# Patient Record
Sex: FEMALE | Race: ASIAN | NOT HISPANIC OR LATINO
[De-identification: names, ages, dates, MRNs, and addresses within clinical notes are randomized per-mention and may not be internally consistent; named-entity substitution may affect disease eponyms.]

---

## 2017-01-05 ENCOUNTER — OTHER (OUTPATIENT)
Age: 57
End: 2017-01-05

## 2017-01-19 ENCOUNTER — APPOINTMENT (OUTPATIENT)
Dept: INTERNAL MEDICINE | Facility: CLINIC | Age: 57
End: 2017-01-19

## 2017-01-19 ENCOUNTER — OUTPATIENT (OUTPATIENT)
Dept: OUTPATIENT SERVICES | Facility: HOSPITAL | Age: 57
LOS: 1 days | End: 2017-01-19
Payer: COMMERCIAL

## 2017-01-19 VITALS
BODY MASS INDEX: 21.4 KG/M2 | DIASTOLIC BLOOD PRESSURE: 100 MMHG | HEART RATE: 95 BPM | HEIGHT: 60 IN | OXYGEN SATURATION: 99 % | WEIGHT: 109 LBS | SYSTOLIC BLOOD PRESSURE: 165 MMHG

## 2017-01-19 VITALS
HEIGHT: 59.5 IN | TEMPERATURE: 99 F | RESPIRATION RATE: 14 BRPM | SYSTOLIC BLOOD PRESSURE: 140 MMHG | HEART RATE: 82 BPM | WEIGHT: 108.03 LBS | DIASTOLIC BLOOD PRESSURE: 100 MMHG

## 2017-01-19 DIAGNOSIS — R87.619 UNSPECIFIED ABNORMAL CYTOLOGICAL FINDINGS IN SPECIMENS FROM CERVIX UTERI: ICD-10-CM

## 2017-01-19 DIAGNOSIS — Z98.890 OTHER SPECIFIED POSTPROCEDURAL STATES: Chronic | ICD-10-CM

## 2017-01-19 DIAGNOSIS — R87.611 ATYPICAL SQUAMOUS CELLS CANNOT EXCLUDE HIGH GRADE SQUAMOUS INTRAEPITHELIAL LESION ON CYTOLOGIC SMEAR OF CERVIX (ASC-H): ICD-10-CM

## 2017-01-19 DIAGNOSIS — Z98.891 HISTORY OF UTERINE SCAR FROM PREVIOUS SURGERY: Chronic | ICD-10-CM

## 2017-01-19 LAB
BUN SERPL-MCNC: 19 MG/DL — SIGNIFICANT CHANGE UP (ref 7–23)
CALCIUM SERPL-MCNC: 10.7 MG/DL — HIGH (ref 8.4–10.5)
CHLORIDE SERPL-SCNC: 100 MMOL/L — SIGNIFICANT CHANGE UP (ref 98–107)
CO2 SERPL-SCNC: 29 MMOL/L — SIGNIFICANT CHANGE UP (ref 22–31)
CREAT SERPL-MCNC: 0.71 MG/DL — SIGNIFICANT CHANGE UP (ref 0.5–1.3)
CYTOLOGY CVX/VAG DOC THIN PREP: ABNORMAL
GLUCOSE SERPL-MCNC: 96 MG/DL — SIGNIFICANT CHANGE UP (ref 70–99)
HCT VFR BLD CALC: 44.1 % — SIGNIFICANT CHANGE UP (ref 34.5–45)
HGB BLD-MCNC: 14.9 G/DL — SIGNIFICANT CHANGE UP (ref 11.5–15.5)
MCHC RBC-ENTMCNC: 30.7 PG — SIGNIFICANT CHANGE UP (ref 27–34)
MCHC RBC-ENTMCNC: 33.8 % — SIGNIFICANT CHANGE UP (ref 32–36)
MCV RBC AUTO: 90.7 FL — SIGNIFICANT CHANGE UP (ref 80–100)
PLATELET # BLD AUTO: 304 K/UL — SIGNIFICANT CHANGE UP (ref 150–400)
PMV BLD: 9.8 FL — SIGNIFICANT CHANGE UP (ref 7–13)
POTASSIUM SERPL-MCNC: 4.1 MMOL/L — SIGNIFICANT CHANGE UP (ref 3.5–5.3)
POTASSIUM SERPL-SCNC: 4.1 MMOL/L — SIGNIFICANT CHANGE UP (ref 3.5–5.3)
RBC # BLD: 4.86 M/UL — SIGNIFICANT CHANGE UP (ref 3.8–5.2)
RBC # FLD: 12.9 % — SIGNIFICANT CHANGE UP (ref 10.3–14.5)
SODIUM SERPL-SCNC: 142 MMOL/L — SIGNIFICANT CHANGE UP (ref 135–145)
WBC # BLD: 4.78 K/UL — SIGNIFICANT CHANGE UP (ref 3.8–10.5)
WBC # FLD AUTO: 4.78 K/UL — SIGNIFICANT CHANGE UP (ref 3.8–10.5)

## 2017-01-19 PROCEDURE — 93010 ELECTROCARDIOGRAM REPORT: CPT

## 2017-01-19 NOTE — H&P PST ADULT - GASTROINTESTINAL DETAILS
soft/no rigidity/bowel sounds normal/nontender/no distention/no guarding/no bruit/no rebound tenderness/no masses palpable/no organomegaly

## 2017-01-19 NOTE — H&P PST ADULT - NEGATIVE CARDIOVASCULAR SYMPTOMS
no orthopnea/no paroxysmal nocturnal dyspnea/no peripheral edema/no dyspnea on exertion/no chest pain/no palpitations/no claudication

## 2017-01-19 NOTE — H&P PST ADULT - RS GEN PE MLT RESP DETAILS PC
respirations non-labored/clear to auscultation bilaterally/good air movement/no rales/no chest wall tenderness/no intercostal retractions/no rhonchi/breath sounds equal/no wheezes

## 2017-01-19 NOTE — H&P PST ADULT - NEGATIVE GENERAL GENITOURINARY SYMPTOMS
no flank pain L/no flank pain R/normal urinary frequency/no dysuria/no bladder infections/no hematuria

## 2017-01-19 NOTE — H&P PST ADULT - PROBLEM SELECTOR PLAN 1
Pt scheduled for cold knife cone biopsy on 1/25/2017.  labs done results pending, ekg done.  Preop teaching done, pt able to verbalize understanding.    OR booking notified of sleep apnea and latex allergy    htn- bp 140/100 pt instructed to obtain medical clearance. Sammi surgical coordinator in Dr. Mills office notified.   Preop teaching done, pt able to verbalize understanding.

## 2017-01-19 NOTE — H&P PST ADULT - MARITAL STATUS
/4 children, mother htn, heart problem, elevated chol , father  55y/o emphysema, 1 brother htn, elevated chol, gall stones

## 2017-01-19 NOTE — H&P PST ADULT - NEGATIVE GENERAL SYMPTOMS
no weight gain/no chills/no polydipsia/no polyphagia/no anorexia/no fatigue/no sweating/no polyuria/no fever/no malaise/no weight loss

## 2017-01-19 NOTE — H&P PST ADULT - NEGATIVE BREAST SYMPTOMS
no breast tenderness R/no nipple discharge R/no breast lump R/no nipple discharge L/no breast tenderness L/no breast lump L

## 2017-01-19 NOTE — H&P PST ADULT - HISTORY OF PRESENT ILLNESS
55y/o female scheduled for cold knife cone biopsy on 1/25/2017.  Pt states, "had 2 abnormal pap smears, s/p colposcopy."

## 2017-01-19 NOTE — H&P PST ADULT - PMH
Abnormal Pap smear of cervix    Hodgkin lymphoma  dx 2001, s/p chemo and radiation last dose 2002  Hypertension    Sleep apnea

## 2017-01-24 ENCOUNTER — RESULT REVIEW (OUTPATIENT)
Age: 57
End: 2017-01-24

## 2017-01-25 ENCOUNTER — APPOINTMENT (OUTPATIENT)
Dept: GYNECOLOGIC ONCOLOGY | Facility: HOSPITAL | Age: 57
End: 2017-01-25

## 2017-01-25 ENCOUNTER — OUTPATIENT (OUTPATIENT)
Dept: OUTPATIENT SERVICES | Facility: HOSPITAL | Age: 57
LOS: 1 days | Discharge: HOME | End: 2017-01-25
Payer: COMMERCIAL

## 2017-01-25 VITALS
OXYGEN SATURATION: 96 % | RESPIRATION RATE: 12 BRPM | TEMPERATURE: 98 F | DIASTOLIC BLOOD PRESSURE: 70 MMHG | SYSTOLIC BLOOD PRESSURE: 129 MMHG | HEART RATE: 73 BPM

## 2017-01-25 VITALS
OXYGEN SATURATION: 98 % | HEIGHT: 59.5 IN | DIASTOLIC BLOOD PRESSURE: 87 MMHG | RESPIRATION RATE: 17 BRPM | HEART RATE: 84 BPM | SYSTOLIC BLOOD PRESSURE: 131 MMHG | TEMPERATURE: 98 F | WEIGHT: 108.03 LBS

## 2017-01-25 DIAGNOSIS — Z98.890 OTHER SPECIFIED POSTPROCEDURAL STATES: Chronic | ICD-10-CM

## 2017-01-25 DIAGNOSIS — R87.611 ATYPICAL SQUAMOUS CELLS CANNOT EXCLUDE HIGH GRADE SQUAMOUS INTRAEPITHELIAL LESION ON CYTOLOGIC SMEAR OF CERVIX (ASC-H): ICD-10-CM

## 2017-01-25 DIAGNOSIS — Z98.891 HISTORY OF UTERINE SCAR FROM PREVIOUS SURGERY: Chronic | ICD-10-CM

## 2017-01-25 PROCEDURE — 88305 TISSUE EXAM BY PATHOLOGIST: CPT | Mod: 26

## 2017-01-25 PROCEDURE — 57520 CONIZATION OF CERVIX: CPT

## 2017-01-25 PROCEDURE — 88307 TISSUE EXAM BY PATHOLOGIST: CPT | Mod: 26

## 2017-01-25 RX ORDER — CALCIUM CARBONATE 500(1250)
1 TABLET ORAL
Qty: 0 | Refills: 0 | COMMUNITY

## 2017-01-25 RX ORDER — ESTRADIOL 4 UG/1
1 INSERT VAGINAL
Qty: 0 | Refills: 0 | COMMUNITY

## 2017-01-25 RX ORDER — METOPROLOL TARTRATE 50 MG
1 TABLET ORAL
Qty: 0 | Refills: 0 | COMMUNITY

## 2017-01-25 RX ORDER — SODIUM CHLORIDE 9 MG/ML
1000 INJECTION, SOLUTION INTRAVENOUS
Qty: 0 | Refills: 0 | Status: DISCONTINUED | OUTPATIENT
Start: 2017-01-25 | End: 2017-01-26

## 2017-01-25 RX ADMIN — SODIUM CHLORIDE 125 MILLILITER(S): 9 INJECTION, SOLUTION INTRAVENOUS at 11:06

## 2017-01-25 NOTE — ASU DISCHARGE PLAN (ADULT/PEDIATRIC). - CONDITIONS AT DISCHARGE
Stable Patient is stable and meets discharge criteria. Patient made aware that he/she must wait on unit to be escorted to awaiting car in front of the main building after being discharged by Anesthesia Department.

## 2017-01-25 NOTE — ASU DISCHARGE PLAN (ADULT/PEDIATRIC). - ACTIVITY LEVEL
no tampons/nothing per vagina/no tub baths/no intercourse/no douching/for two weeks/no heavy lifting no intercourse/Nothing in vagina, no intercourse, no douching, no tampons, no tub baths, and no swimming for about 2 weeks or until cleared by MD. Contact your doctor if you are soaking a pad every hour or experience foul smelling discharge for two weeks/no douching/nothing per vagina/no tampons/no tub baths/no heavy lifting Nothing in vagina, no intercourse, no douching, no tampons, no tub baths, and no swimming for about 2 weeks or until cleared by MD. Contact your doctor if you are soaking a pad every hour or experience foul smelling discharge for two weeks/no sports/gym/no heavy lifting/no intercourse/nothing per vagina/no douching/no tub baths/no tampons

## 2017-01-25 NOTE — ASU DISCHARGE PLAN (ADULT/PEDIATRIC). - NOTIFY
Unable to Urinate/Bleeding that does not stop/Inability to Tolerate Liquids or Foods/Persistent Nausea and Vomiting/Pain not relieved by Medications/GYN Fever>100.4 Bleeding that does not stop/Unable to Urinate/Pain not relieved by Medications/Increased Irritability or Sluggishness/Inability to Tolerate Liquids or Foods/GYN Fever>100.4/Persistent Nausea and Vomiting/Excessive Diarrhea

## 2017-01-25 NOTE — ASU DISCHARGE PLAN (ADULT/PEDIATRIC). - NURSING INSTRUCTIONS
You were given IV Tylenol for pain management.  Please DO NOT take tylenol for the next 8 hours (until _______ ).   You were given Toradol for pain management. Please DO Not take Motrin/Ibuprofen (NSAIDS) for the next 6 hours (Until _______).

## 2017-01-25 NOTE — ASU DISCHARGE PLAN (ADULT/PEDIATRIC). - MEDICATION SUMMARY - MEDICATIONS TO TAKE
I will START or STAY ON the medications listed below when I get home from the hospital:    Tylenol 325 mg oral tablet  -- 3 tab(s) by mouth every 6 hours, As Needed  -- Indication: For pain    ibuprofen 600 mg oral tablet  -- 1 tab(s) by mouth every 6 hours, As Needed  -- Indication: For pain

## 2017-01-28 PROBLEM — R87.619 UNSPECIFIED ABNORMAL CYTOLOGICAL FINDINGS IN SPECIMENS FROM CERVIX UTERI: Chronic | Status: ACTIVE | Noted: 2017-01-19

## 2017-01-28 PROBLEM — I10 ESSENTIAL (PRIMARY) HYPERTENSION: Chronic | Status: ACTIVE | Noted: 2017-01-19

## 2017-01-28 PROBLEM — G47.30 SLEEP APNEA, UNSPECIFIED: Chronic | Status: ACTIVE | Noted: 2017-01-19

## 2017-01-28 PROBLEM — C81.90 HODGKIN LYMPHOMA, UNSPECIFIED, UNSPECIFIED SITE: Chronic | Status: ACTIVE | Noted: 2017-01-19

## 2017-01-31 LAB — SURGICAL PATHOLOGY STUDY: SIGNIFICANT CHANGE UP

## 2017-02-13 ENCOUNTER — APPOINTMENT (OUTPATIENT)
Dept: GYNECOLOGIC ONCOLOGY | Facility: CLINIC | Age: 57
End: 2017-02-13

## 2017-04-02 ENCOUNTER — RESULT CHARGE (OUTPATIENT)
Age: 57
End: 2017-04-02

## 2017-04-03 ENCOUNTER — APPOINTMENT (OUTPATIENT)
Dept: INTERNAL MEDICINE | Facility: CLINIC | Age: 57
End: 2017-04-03

## 2017-04-03 ENCOUNTER — NON-APPOINTMENT (OUTPATIENT)
Age: 57
End: 2017-04-03

## 2017-04-03 VITALS — SYSTOLIC BLOOD PRESSURE: 140 MMHG | DIASTOLIC BLOOD PRESSURE: 80 MMHG | BODY MASS INDEX: 21.29 KG/M2 | WEIGHT: 109 LBS

## 2017-04-03 VITALS — DIASTOLIC BLOOD PRESSURE: 80 MMHG | HEART RATE: 66 BPM | SYSTOLIC BLOOD PRESSURE: 160 MMHG

## 2017-04-03 VITALS — SYSTOLIC BLOOD PRESSURE: 154 MMHG | DIASTOLIC BLOOD PRESSURE: 90 MMHG

## 2017-05-22 ENCOUNTER — APPOINTMENT (OUTPATIENT)
Dept: OPHTHALMOLOGY | Facility: CLINIC | Age: 57
End: 2017-05-22

## 2017-08-24 ENCOUNTER — OUTPATIENT (OUTPATIENT)
Dept: OUTPATIENT SERVICES | Facility: HOSPITAL | Age: 57
LOS: 1 days | Discharge: HOME | End: 2017-08-24

## 2017-08-24 DIAGNOSIS — C85.88 OTHER SPECIFIED TYPES OF NON-HODGKIN LYMPHOMA, LYMPH NODES OF MULTIPLE SITES: ICD-10-CM

## 2017-08-24 DIAGNOSIS — Z98.891 HISTORY OF UTERINE SCAR FROM PREVIOUS SURGERY: Chronic | ICD-10-CM

## 2017-08-24 DIAGNOSIS — Z98.890 OTHER SPECIFIED POSTPROCEDURAL STATES: Chronic | ICD-10-CM

## 2017-08-29 ENCOUNTER — APPOINTMENT (OUTPATIENT)
Dept: HEMATOLOGY ONCOLOGY | Facility: CLINIC | Age: 57
End: 2017-08-29
Payer: COMMERCIAL

## 2017-09-27 ENCOUNTER — APPOINTMENT (OUTPATIENT)
Dept: OBGYN | Facility: CLINIC | Age: 57
End: 2017-09-27
Payer: COMMERCIAL

## 2017-09-27 VITALS
HEIGHT: 60 IN | BODY MASS INDEX: 21.99 KG/M2 | WEIGHT: 112 LBS | SYSTOLIC BLOOD PRESSURE: 156 MMHG | DIASTOLIC BLOOD PRESSURE: 77 MMHG

## 2017-09-27 DIAGNOSIS — R87.611 ATYPICAL SQUAMOUS CELLS CANNOT EXCLUDE HIGH GRADE SQUAMOUS INTRAEPITHELIAL LESION ON CYTOLOGIC SMEAR OF CERVIX (ASC-H): ICD-10-CM

## 2017-09-27 PROCEDURE — 99396 PREV VISIT EST AGE 40-64: CPT

## 2017-09-28 LAB — HPV HIGH+LOW RISK DNA PNL CVX: NEGATIVE

## 2017-10-02 ENCOUNTER — APPOINTMENT (OUTPATIENT)
Dept: OPHTHALMOLOGY | Facility: CLINIC | Age: 57
End: 2017-10-02
Payer: COMMERCIAL

## 2017-10-02 DIAGNOSIS — H25.092 OTHER AGE-RELATED INCIPIENT CATARACT, LEFT EYE: ICD-10-CM

## 2017-10-02 PROCEDURE — 92014 COMPRE OPH EXAM EST PT 1/>: CPT

## 2017-10-12 LAB — CYTOLOGY CVX/VAG DOC THIN PREP: NORMAL

## 2017-10-24 ENCOUNTER — OUTPATIENT (OUTPATIENT)
Dept: OUTPATIENT SERVICES | Facility: HOSPITAL | Age: 57
LOS: 1 days | Discharge: HOME | End: 2017-10-24

## 2017-10-24 DIAGNOSIS — D64.9 ANEMIA, UNSPECIFIED: ICD-10-CM

## 2017-10-24 DIAGNOSIS — Z98.890 OTHER SPECIFIED POSTPROCEDURAL STATES: Chronic | ICD-10-CM

## 2017-10-24 DIAGNOSIS — Z98.891 HISTORY OF UTERINE SCAR FROM PREVIOUS SURGERY: Chronic | ICD-10-CM

## 2017-10-30 ENCOUNTER — RESULT REVIEW (OUTPATIENT)
Age: 57
End: 2017-10-30

## 2017-10-30 ENCOUNTER — APPOINTMENT (OUTPATIENT)
Dept: INFUSION THERAPY | Facility: HOSPITAL | Age: 57
End: 2017-10-30

## 2017-10-30 ENCOUNTER — APPOINTMENT (OUTPATIENT)
Dept: HEMATOLOGY ONCOLOGY | Facility: CLINIC | Age: 57
End: 2017-10-30
Payer: COMMERCIAL

## 2017-10-30 VITALS
WEIGHT: 113.76 LBS | HEART RATE: 72 BPM | RESPIRATION RATE: 16 BRPM | BODY MASS INDEX: 22.22 KG/M2 | DIASTOLIC BLOOD PRESSURE: 90 MMHG | TEMPERATURE: 98.4 F | OXYGEN SATURATION: 96 % | SYSTOLIC BLOOD PRESSURE: 155 MMHG

## 2017-10-30 PROCEDURE — 99215 OFFICE O/P EST HI 40 MIN: CPT

## 2017-11-06 ENCOUNTER — RX RENEWAL (OUTPATIENT)
Age: 57
End: 2017-11-06

## 2017-12-11 ENCOUNTER — NON-APPOINTMENT (OUTPATIENT)
Age: 57
End: 2017-12-11

## 2017-12-11 ENCOUNTER — APPOINTMENT (OUTPATIENT)
Dept: INTERNAL MEDICINE | Facility: CLINIC | Age: 57
End: 2017-12-11
Payer: COMMERCIAL

## 2017-12-11 VITALS
HEIGHT: 60 IN | WEIGHT: 110 LBS | BODY MASS INDEX: 21.6 KG/M2 | DIASTOLIC BLOOD PRESSURE: 90 MMHG | OXYGEN SATURATION: 98 % | HEART RATE: 78 BPM | SYSTOLIC BLOOD PRESSURE: 135 MMHG

## 2017-12-11 DIAGNOSIS — Z86.39 PERSONAL HISTORY OF OTHER ENDOCRINE, NUTRITIONAL AND METABOLIC DISEASE: ICD-10-CM

## 2017-12-11 DIAGNOSIS — R25.1 TREMOR, UNSPECIFIED: ICD-10-CM

## 2017-12-11 PROCEDURE — 99396 PREV VISIT EST AGE 40-64: CPT | Mod: 25

## 2017-12-11 PROCEDURE — 93000 ELECTROCARDIOGRAM COMPLETE: CPT

## 2017-12-11 PROCEDURE — G0008: CPT

## 2017-12-11 PROCEDURE — 90688 IIV4 VACCINE SPLT 0.5 ML IM: CPT

## 2017-12-27 ENCOUNTER — APPOINTMENT (OUTPATIENT)
Dept: CV DIAGNOSITCS | Facility: HOSPITAL | Age: 57
End: 2017-12-27

## 2017-12-27 ENCOUNTER — OUTPATIENT (OUTPATIENT)
Dept: OUTPATIENT SERVICES | Facility: HOSPITAL | Age: 57
LOS: 1 days | End: 2017-12-27
Payer: COMMERCIAL

## 2017-12-27 DIAGNOSIS — Z98.891 HISTORY OF UTERINE SCAR FROM PREVIOUS SURGERY: Chronic | ICD-10-CM

## 2017-12-27 DIAGNOSIS — Z98.890 OTHER SPECIFIED POSTPROCEDURAL STATES: Chronic | ICD-10-CM

## 2017-12-27 DIAGNOSIS — I35.1 NONRHEUMATIC AORTIC (VALVE) INSUFFICIENCY: ICD-10-CM

## 2017-12-27 PROCEDURE — 93351 STRESS TTE COMPLETE: CPT

## 2017-12-27 PROCEDURE — 93350 STRESS TTE ONLY: CPT | Mod: 26

## 2017-12-27 PROCEDURE — 93320 DOPPLER ECHO COMPLETE: CPT

## 2017-12-27 PROCEDURE — 93320 DOPPLER ECHO COMPLETE: CPT | Mod: 26

## 2017-12-27 PROCEDURE — 93325 DOPPLER ECHO COLOR FLOW MAPG: CPT | Mod: 26

## 2017-12-27 PROCEDURE — 93325 DOPPLER ECHO COLOR FLOW MAPG: CPT

## 2018-01-04 ENCOUNTER — APPOINTMENT (OUTPATIENT)
Dept: CT IMAGING | Facility: IMAGING CENTER | Age: 58
End: 2018-01-04

## 2018-01-16 LAB
BASOPHILS # BLD AUTO: 0 K/UL — SIGNIFICANT CHANGE UP (ref 0–0.2)
BASOPHILS NFR BLD AUTO: 0.5 % — SIGNIFICANT CHANGE UP (ref 0–2)
EOSINOPHIL # BLD AUTO: 0.2 K/UL — SIGNIFICANT CHANGE UP (ref 0–0.5)
EOSINOPHIL NFR BLD AUTO: 5.6 % — SIGNIFICANT CHANGE UP (ref 0–6)
HCT VFR BLD CALC: 43.1 % — SIGNIFICANT CHANGE UP (ref 34.5–45)
HGB BLD-MCNC: 14.5 G/DL — SIGNIFICANT CHANGE UP (ref 11.5–15.5)
LYMPHOCYTES # BLD AUTO: 1.2 K/UL — SIGNIFICANT CHANGE UP (ref 1–3.3)
LYMPHOCYTES # BLD AUTO: 29 % — SIGNIFICANT CHANGE UP (ref 13–44)
MCHC RBC-ENTMCNC: 31.1 PG — SIGNIFICANT CHANGE UP (ref 27–34)
MCHC RBC-ENTMCNC: 33.7 G/DL — SIGNIFICANT CHANGE UP (ref 32–36)
MCV RBC AUTO: 92.4 FL — SIGNIFICANT CHANGE UP (ref 80–100)
MONOCYTES # BLD AUTO: 0.4 K/UL — SIGNIFICANT CHANGE UP (ref 0–0.9)
MONOCYTES NFR BLD AUTO: 10.3 % — SIGNIFICANT CHANGE UP (ref 2–14)
NEUTROPHILS # BLD AUTO: 2.3 K/UL — SIGNIFICANT CHANGE UP (ref 1.8–7.4)
NEUTROPHILS NFR BLD AUTO: 54.6 % — SIGNIFICANT CHANGE UP (ref 43–77)
PLATELET # BLD AUTO: 234 K/UL — SIGNIFICANT CHANGE UP (ref 150–400)
RBC # BLD: 4.67 M/UL — SIGNIFICANT CHANGE UP (ref 3.8–5.2)
RBC # FLD: 11.8 % — SIGNIFICANT CHANGE UP (ref 10.3–14.5)
WBC # BLD: 4.2 K/UL — SIGNIFICANT CHANGE UP (ref 3.8–10.5)
WBC # FLD AUTO: 4.2 K/UL — SIGNIFICANT CHANGE UP (ref 3.8–10.5)

## 2018-02-04 ENCOUNTER — FORM ENCOUNTER (OUTPATIENT)
Age: 58
End: 2018-02-04

## 2018-02-05 ENCOUNTER — OUTPATIENT (OUTPATIENT)
Dept: OUTPATIENT SERVICES | Facility: HOSPITAL | Age: 58
LOS: 1 days | End: 2018-02-05
Payer: COMMERCIAL

## 2018-02-05 ENCOUNTER — APPOINTMENT (OUTPATIENT)
Dept: CT IMAGING | Facility: IMAGING CENTER | Age: 58
End: 2018-02-05
Payer: COMMERCIAL

## 2018-02-05 DIAGNOSIS — Z98.891 HISTORY OF UTERINE SCAR FROM PREVIOUS SURGERY: Chronic | ICD-10-CM

## 2018-02-05 DIAGNOSIS — Z98.890 OTHER SPECIFIED POSTPROCEDURAL STATES: Chronic | ICD-10-CM

## 2018-02-05 DIAGNOSIS — J84.10 PULMONARY FIBROSIS, UNSPECIFIED: ICD-10-CM

## 2018-02-05 PROCEDURE — 71250 CT THORAX DX C-: CPT | Mod: 26

## 2018-02-05 PROCEDURE — 71250 CT THORAX DX C-: CPT

## 2018-02-13 ENCOUNTER — TRANSCRIPTION ENCOUNTER (OUTPATIENT)
Age: 58
End: 2018-02-13

## 2018-05-07 ENCOUNTER — APPOINTMENT (OUTPATIENT)
Dept: INTERNAL MEDICINE | Facility: CLINIC | Age: 58
End: 2018-05-07
Payer: COMMERCIAL

## 2018-05-07 VITALS
SYSTOLIC BLOOD PRESSURE: 142 MMHG | BODY MASS INDEX: 21.6 KG/M2 | TEMPERATURE: 99.8 F | DIASTOLIC BLOOD PRESSURE: 84 MMHG | HEIGHT: 60 IN | WEIGHT: 110 LBS

## 2018-05-07 PROCEDURE — 99213 OFFICE O/P EST LOW 20 MIN: CPT

## 2018-05-11 ENCOUNTER — TRANSCRIPTION ENCOUNTER (OUTPATIENT)
Age: 58
End: 2018-05-11

## 2018-10-29 ENCOUNTER — RX RENEWAL (OUTPATIENT)
Age: 58
End: 2018-10-29

## 2018-11-15 ENCOUNTER — APPOINTMENT (OUTPATIENT)
Dept: OBGYN | Facility: CLINIC | Age: 58
End: 2018-11-15
Payer: SELF-PAY

## 2018-11-15 VITALS
SYSTOLIC BLOOD PRESSURE: 144 MMHG | DIASTOLIC BLOOD PRESSURE: 78 MMHG | HEIGHT: 60 IN | WEIGHT: 116 LBS | BODY MASS INDEX: 22.78 KG/M2

## 2018-11-15 LAB
BILIRUB UR QL STRIP: NORMAL
GLUCOSE UR-MCNC: NORMAL
HCG UR QL: 0.2 EU/DL
HGB UR QL STRIP.AUTO: NORMAL
KETONES UR-MCNC: NORMAL
LEUKOCYTE ESTERASE UR QL STRIP: NORMAL
NITRITE UR QL STRIP: NORMAL
PH UR STRIP: 7
PROT UR STRIP-MCNC: NORMAL
SP GR UR STRIP: 1.01

## 2018-11-15 PROCEDURE — 99396 PREV VISIT EST AGE 40-64: CPT

## 2018-11-15 PROCEDURE — 81003 URINALYSIS AUTO W/O SCOPE: CPT | Mod: QW

## 2018-11-17 LAB
BACTERIA UR CULT: NORMAL
HPV HIGH+LOW RISK DNA PNL CVX: NOT DETECTED

## 2018-12-21 LAB — CYTOLOGY CVX/VAG DOC THIN PREP: NORMAL

## 2019-01-10 ENCOUNTER — RX RENEWAL (OUTPATIENT)
Age: 59
End: 2019-01-10

## 2019-01-22 ENCOUNTER — APPOINTMENT (OUTPATIENT)
Dept: OBGYN | Facility: CLINIC | Age: 59
End: 2019-01-22
Payer: COMMERCIAL

## 2019-01-22 VITALS
DIASTOLIC BLOOD PRESSURE: 70 MMHG | SYSTOLIC BLOOD PRESSURE: 120 MMHG | BODY MASS INDEX: 22.78 KG/M2 | HEIGHT: 60 IN | WEIGHT: 116 LBS

## 2019-01-22 PROCEDURE — 57454 BX/CURETT OF CERVIX W/SCOPE: CPT

## 2019-01-22 NOTE — PROCEDURE
[Colposcopy] : colposcopy [Patient] : patient [Risks] : risks [Benefits] : benefits [Alternatives] : alternatives [Infection] : infection [Bleeding] : bleeding [Allergic Reaction] : allergic reaction [Consent Obtained] : written consent was obtained prior to the procedure [Pap Performed] : a cervical Pap smear was not performed [SCJ Fully Visulized] : the squamocolumnar junction was not fully visualized [Punctation ___ o'clock] : no punctation [Acetowhite ___ o'clock] : ascetowhite changes at [unfilled] ~Uo'clock [Mosaicism ___ o'clock] : no mosaicism [Atypical Vessels ___ o'clock] : no atypical vessels [No Abnormalities] : no abnormalities seen [Biopsies Taken: # ___] : [unfilled] biopsies taken of the cervix [Biopsy Locations ___ o'clock] : the biopsies were taken at [unfilled] o'clock [ECC Done] : Endocervical curettage was not performed. [Jordana's] : Jordana's solution [Direct Pressure] : direct pressure [Tolerated Well] : the patient tolerated the procedure well [No Complications] : there were no complications [FreeTextEntry9] : ASC-H   NEG HPV

## 2019-01-29 LAB — CORE LAB BIOPSY: NORMAL

## 2019-01-30 ENCOUNTER — OTHER (OUTPATIENT)
Age: 59
End: 2019-01-30

## 2019-02-26 ENCOUNTER — APPOINTMENT (OUTPATIENT)
Dept: INTERNAL MEDICINE | Facility: CLINIC | Age: 59
End: 2019-02-26
Payer: COMMERCIAL

## 2019-02-26 VITALS
SYSTOLIC BLOOD PRESSURE: 132 MMHG | DIASTOLIC BLOOD PRESSURE: 80 MMHG | HEART RATE: 80 BPM | BODY MASS INDEX: 22.38 KG/M2 | HEIGHT: 60 IN | WEIGHT: 114 LBS | OXYGEN SATURATION: 98 %

## 2019-02-26 PROCEDURE — 99214 OFFICE O/P EST MOD 30 MIN: CPT

## 2019-02-26 RX ORDER — CYCLOSPORINE 0.5 MG/ML
0.05 EMULSION OPHTHALMIC TWICE DAILY
Qty: 3 | Refills: 3 | Status: DISCONTINUED | COMMUNITY
Start: 2017-05-22 | End: 2019-02-26

## 2019-02-26 RX ORDER — MECLIZINE HYDROCHLORIDE 12.5 MG/1
12.5 TABLET ORAL TWICE DAILY
Qty: 20 | Refills: 0 | Status: DISCONTINUED | COMMUNITY
Start: 2017-04-03 | End: 2019-02-26

## 2019-02-27 LAB
25(OH)D3 SERPL-MCNC: 34.7 NG/ML
ALBUMIN SERPL ELPH-MCNC: 4.6 G/DL
ALP BLD-CCNC: 73 U/L
ALT SERPL-CCNC: 14 U/L
ANION GAP SERPL CALC-SCNC: 14 MMOL/L
AST SERPL-CCNC: 20 U/L
BASOPHILS # BLD AUTO: 0.02 K/UL
BASOPHILS NFR BLD AUTO: 0.3 %
BILIRUB SERPL-MCNC: 0.4 MG/DL
BUN SERPL-MCNC: 17 MG/DL
CALCIUM SERPL-MCNC: 9.9 MG/DL
CHLORIDE SERPL-SCNC: 102 MMOL/L
CHOLEST SERPL-MCNC: 264 MG/DL
CHOLEST/HDLC SERPL: 3.1 RATIO
CO2 SERPL-SCNC: 27 MMOL/L
CREAT SERPL-MCNC: 0.68 MG/DL
EOSINOPHIL # BLD AUTO: 0.18 K/UL
EOSINOPHIL NFR BLD AUTO: 2.9 %
GLUCOSE SERPL-MCNC: 102 MG/DL
HBA1C MFR BLD HPLC: 6 %
HCT VFR BLD CALC: 41.9 %
HDLC SERPL-MCNC: 85 MG/DL
HGB BLD-MCNC: 13.5 G/DL
IMM GRANULOCYTES NFR BLD AUTO: 0.3 %
LDLC SERPL CALC-MCNC: 156 MG/DL
LYMPHOCYTES # BLD AUTO: 1.05 K/UL
LYMPHOCYTES NFR BLD AUTO: 17.2 %
MAN DIFF?: NORMAL
MCHC RBC-ENTMCNC: 30.4 PG
MCHC RBC-ENTMCNC: 32.2 GM/DL
MCV RBC AUTO: 94.4 FL
MONOCYTES # BLD AUTO: 0.35 K/UL
MONOCYTES NFR BLD AUTO: 5.7 %
NEUTROPHILS # BLD AUTO: 4.49 K/UL
NEUTROPHILS NFR BLD AUTO: 73.6 %
PLATELET # BLD AUTO: 294 K/UL
POTASSIUM SERPL-SCNC: 4.3 MMOL/L
PROT SERPL-MCNC: 7.5 G/DL
RBC # BLD: 4.44 M/UL
RBC # FLD: 12.9 %
SODIUM SERPL-SCNC: 143 MMOL/L
T4 FREE SERPL-MCNC: 1 NG/DL
TRIGL SERPL-MCNC: 116 MG/DL
TSH SERPL-ACNC: 6.5 UIU/ML
WBC # FLD AUTO: 6.11 K/UL

## 2019-02-27 NOTE — HISTORY OF PRESENT ILLNESS
[FreeTextEntry8] : 59 yo female with h/o as below including Hodgkin's lymphoma s/p treatment in remission here for not feeling well.\par Has been visiting gyn, had sonogram, pap came back abnl so had colposcopy, had mammo done.  Had lower pelvic pain/back pain, worse after colposcopy and pelvic sono, now pain resolved, pelvic sono nl.\par Just not feeling well, feels fatigued/body weakness when not active, fatigued, feeling cold at night.  No fevers, but hands are cold.  No weight changes.  +GERD lately.  Not sure if anxious and that's what is causing it.  Had abdominal pain few weeks ago but not now.  No night sweats.  Slight diarrhea but not really watery, just soft.  Feels wants to sleep more.  \par Saw optometrist as needed new pair of eyeglasses, said has hemorrhage of the eye, advised to check for DM.\par Got flu shot this year.\par No other active issues.

## 2019-02-27 NOTE — PHYSICAL EXAM
[No Acute Distress] : no acute distress [Well Nourished] : well nourished [Well Developed] : well developed [Well-Appearing] : well-appearing [Supple] : supple [No Lymphadenopathy] : no lymphadenopathy [Thyroid Normal, No Nodules] : the thyroid was normal and there were no nodules present [No Respiratory Distress] : no respiratory distress  [Clear to Auscultation] : lungs were clear to auscultation bilaterally [No Accessory Muscle Use] : no accessory muscle use [Normal Rate] : normal rate  [Regular Rhythm] : with a regular rhythm [Normal S1, S2] : normal S1 and S2 [No Carotid Bruits] : no carotid bruits [No Edema] : there was no peripheral edema [Soft] : abdomen soft [Non Tender] : non-tender [Non-distended] : non-distended [No Masses] : no abdominal mass palpated [No HSM] : no HSM [Normal Bowel Sounds] : normal bowel sounds [Normal Supraclavicular Nodes] : no supraclavicular lymphadenopathy [Normal Posterior Cervical Nodes] : no posterior cervical lymphadenopathy [Normal Anterior Cervical Nodes] : no anterior cervical lymphadenopathy [de-identified] : intermitent 2/6 BETH

## 2019-02-27 NOTE — ASSESSMENT
[FreeTextEntry1] : 57 yo female with h/o as above including subclinical hypothyroidism, preDM, hodgkin's disease s/p treatment in remission, here for not feeling well, some abdominal pain/back pain that resolved, fatigue/cold symptoms, will check full set of labs below, advised rest in case viral syndrome, if continues and nl labs, will eval further at next visit in a few weeks, to call sooner if worsening before then.

## 2019-02-27 NOTE — ADDENDUM
[FreeTextEntry1] : 2/27/19:  Discussed labs with pt, nl except TSH 6.50 elevated with nl free T4 (subclinical hypothyroidism), could be contributing to fatigue/cold symptoms but did take aleve last night and slept more and feeling better today, alternative is that pt has viral syndrome, will monitor symptoms and repeat TFTs in 1 month at next visit, if still abnl or still symptoms would consider treatment at that time.  , to work on diet/exercise, hgbA1c 6.0 c/w preDM, to c/w diet, other labs nl.

## 2019-03-26 ENCOUNTER — NON-APPOINTMENT (OUTPATIENT)
Age: 59
End: 2019-03-26

## 2019-03-26 ENCOUNTER — APPOINTMENT (OUTPATIENT)
Dept: INTERNAL MEDICINE | Facility: CLINIC | Age: 59
End: 2019-03-26
Payer: COMMERCIAL

## 2019-03-26 VITALS
HEART RATE: 76 BPM | SYSTOLIC BLOOD PRESSURE: 140 MMHG | HEIGHT: 60 IN | BODY MASS INDEX: 23.16 KG/M2 | DIASTOLIC BLOOD PRESSURE: 80 MMHG | OXYGEN SATURATION: 97 % | WEIGHT: 118 LBS

## 2019-03-26 DIAGNOSIS — J06.9 ACUTE UPPER RESPIRATORY INFECTION, UNSPECIFIED: ICD-10-CM

## 2019-03-26 DIAGNOSIS — B97.89 ACUTE UPPER RESPIRATORY INFECTION, UNSPECIFIED: ICD-10-CM

## 2019-03-26 PROCEDURE — 99396 PREV VISIT EST AGE 40-64: CPT | Mod: 25

## 2019-03-26 PROCEDURE — 90715 TDAP VACCINE 7 YRS/> IM: CPT

## 2019-03-26 PROCEDURE — 90471 IMMUNIZATION ADMIN: CPT

## 2019-03-26 PROCEDURE — 93000 ELECTROCARDIOGRAM COMPLETE: CPT

## 2019-04-01 ENCOUNTER — APPOINTMENT (OUTPATIENT)
Dept: CT IMAGING | Facility: CLINIC | Age: 59
End: 2019-04-01

## 2019-04-01 LAB
T4 FREE SERPL-MCNC: 1 NG/DL
THYROGLOB AB SERPL-ACNC: <20 IU/ML
THYROPEROXIDASE AB SERPL IA-ACNC: <10 IU/ML
TSH SERPL-ACNC: 10.2 UIU/ML

## 2019-04-28 ENCOUNTER — RX RENEWAL (OUTPATIENT)
Age: 59
End: 2019-04-28

## 2019-04-29 ENCOUNTER — TRANSCRIPTION ENCOUNTER (OUTPATIENT)
Age: 59
End: 2019-04-29

## 2019-05-06 ENCOUNTER — APPOINTMENT (OUTPATIENT)
Dept: INTERNAL MEDICINE | Facility: CLINIC | Age: 59
End: 2019-05-06
Payer: COMMERCIAL

## 2019-05-06 VITALS
SYSTOLIC BLOOD PRESSURE: 152 MMHG | WEIGHT: 110 LBS | DIASTOLIC BLOOD PRESSURE: 82 MMHG | OXYGEN SATURATION: 97 % | HEART RATE: 86 BPM | HEIGHT: 60 IN | BODY MASS INDEX: 21.6 KG/M2

## 2019-05-06 PROCEDURE — 99214 OFFICE O/P EST MOD 30 MIN: CPT

## 2019-05-13 ENCOUNTER — FORM ENCOUNTER (OUTPATIENT)
Age: 59
End: 2019-05-13

## 2019-05-14 ENCOUNTER — OUTPATIENT (OUTPATIENT)
Dept: OUTPATIENT SERVICES | Facility: HOSPITAL | Age: 59
LOS: 1 days | End: 2019-05-14
Payer: COMMERCIAL

## 2019-05-14 ENCOUNTER — APPOINTMENT (OUTPATIENT)
Dept: CT IMAGING | Facility: CLINIC | Age: 59
End: 2019-05-14

## 2019-05-14 DIAGNOSIS — Z98.891 HISTORY OF UTERINE SCAR FROM PREVIOUS SURGERY: Chronic | ICD-10-CM

## 2019-05-14 DIAGNOSIS — Z00.8 ENCOUNTER FOR OTHER GENERAL EXAMINATION: ICD-10-CM

## 2019-05-14 DIAGNOSIS — Z98.890 OTHER SPECIFIED POSTPROCEDURAL STATES: Chronic | ICD-10-CM

## 2019-05-14 PROCEDURE — 71250 CT THORAX DX C-: CPT

## 2019-05-14 PROCEDURE — 71250 CT THORAX DX C-: CPT | Mod: 26

## 2019-05-29 ENCOUNTER — APPOINTMENT (OUTPATIENT)
Dept: OPHTHALMOLOGY | Facility: CLINIC | Age: 59
End: 2019-05-29
Payer: COMMERCIAL

## 2019-05-29 ENCOUNTER — MEDICATION RENEWAL (OUTPATIENT)
Age: 59
End: 2019-05-29

## 2019-05-29 DIAGNOSIS — H04.123 DRY EYE SYNDROME OF BILATERAL LACRIMAL GLANDS: ICD-10-CM

## 2019-05-29 PROCEDURE — 92014 COMPRE OPH EXAM EST PT 1/>: CPT

## 2019-06-16 NOTE — HISTORY OF PRESENT ILLNESS
[FreeTextEntry8] : Notes nasal congestion, post nasal drip and cough fo rpast week along with some discharge on eyes. denies fever, chills, no dyspnea

## 2019-06-16 NOTE — REVIEW OF SYSTEMS
[Discharge] : discharge [Earache] : no earache [Nasal Discharge] : nasal discharge [Hearing Loss] : no hearing loss [Nosebleed] : no nosebleeds [Postnasal Drip] : postnasal drip [Shortness Of Breath] : no shortness of breath [Sore Throat] : sore throat [Wheezing] : no wheezing [Cough] : cough [Negative] : Gastrointestinal

## 2019-06-16 NOTE — PHYSICAL EXAM
[No Acute Distress] : no acute distress [PERRL] : pupils equal round and reactive to light [EOMI] : extraocular movements intact [No JVD] : no jugular venous distention [Normal Outer Ear/Nose] : the outer ears and nose were normal in appearance [No Respiratory Distress] : no respiratory distress  [Supple] : supple [de-identified] : mild njection of conjunctia with slight crust n [de-identified] : cobbestoning, boggy turbinates [Clear to Auscultation] : lungs were clear to auscultation bilaterally

## 2019-07-09 ENCOUNTER — APPOINTMENT (OUTPATIENT)
Dept: INTERNAL MEDICINE | Facility: CLINIC | Age: 59
End: 2019-07-09
Payer: COMMERCIAL

## 2019-07-09 VITALS
DIASTOLIC BLOOD PRESSURE: 70 MMHG | OXYGEN SATURATION: 96 % | BODY MASS INDEX: 22.07 KG/M2 | SYSTOLIC BLOOD PRESSURE: 132 MMHG | WEIGHT: 113 LBS | HEART RATE: 86 BPM

## 2019-07-09 LAB — TSH SERPL-ACNC: 0.69 UIU/ML

## 2019-07-09 PROCEDURE — 99214 OFFICE O/P EST MOD 30 MIN: CPT

## 2019-07-09 RX ORDER — AZITHROMYCIN 250 MG/1
250 TABLET, FILM COATED ORAL
Qty: 1 | Refills: 0 | Status: DISCONTINUED | COMMUNITY
Start: 2019-05-06 | End: 2019-07-09

## 2019-07-09 NOTE — ASSESSMENT
[FreeTextEntry1] : 59 yo female with h/o as above including HTN, hyperlipidemia, subclinical hypothyroidism, preDM, osteopenia, Hodgkin's lymphoma s/p treatment, pulmonary fibrosis and pulmonary nodule, abnl pap, ZAKI, here for CPE.\par 1.  CV - irregular rhythm on exam, ECG NSR with respiratory variation; bp borderline (if continues to be high at future visits would optimize regimen), recent bmp nl, lipids elevated (to work on diet/exercise)\par 2.  Endo - check TFTS again and if still abnl will start synthroid; recent hgbA1c still showing preDM, dexa utd, vitamin D nl\par 3.  Gyn - pap utd, followed by gyn, mammo utd\par 4.  GI - colonoscopy utd; recent GI symptoms that resolved likely due to GI bug, will monitor\par 5.  Pulm - due for CT chest to f/up pulmonary nodule and fibrosis\par 6.  HCM - other labs done recently nl; tdap today, other vaccines utd\par 7.  RTO 6 months f/up visit (sooner if starts synthroid)

## 2019-07-09 NOTE — REVIEW OF SYSTEMS
[Fever] : no fever [Chills] : no chills [Earache] : no earache [Postnasal Drip] : postnasal drip [Cough] : cough [Wheezing] : no wheezing [FreeTextEntry6] : occ QUINONES no different from her baseline [Negative] : Cardiovascular

## 2019-07-09 NOTE — REVIEW OF SYSTEMS
[Fatigue] : fatigue [Recent Change In Weight] : ~T recent weight change [Palpitations] : palpitations [Abdominal Pain] : abdominal pain [Negative] : Musculoskeletal [Fever] : no fever [Chills] : no chills [Chest Pain] : no chest pain [Shortness Of Breath] : no shortness of breath [Cough] : no cough [Nausea] : no nausea [Constipation] : no constipation [Diarrhea] : diarrhea [Vomiting] : no vomiting [Heartburn] : no heartburn [Melena] : no melena [Dysuria] : no dysuria [Vaginal Discharge] : no vaginal discharge [Dizziness] : no dizziness [Fainting] : no fainting [Anxiety] : no anxiety [Depression] : no depression [Easy Bleeding] : no easy bleeding [Easy Bruising] : no easy bruising [Swollen Glands] : no swollen glands [FreeTextEntry2] : believes gained weight, few lbs [FreeTextEntry3] : will see optho Dr. Carvalho, dryness eyes [FreeTextEntry9] : slight joint pain [de-identified] : skin rash from [place], dryness, using aquaphor

## 2019-07-09 NOTE — HISTORY OF PRESENT ILLNESS
[Cough] : cough [Moderate] : moderate [___ Weeks ago] :  [unfilled] weeks ago [Episodic] : episodic  [Shortness Of Breath] : shortness of breath [Stable] : stable [de-identified] : Paroxysms 3-4x/day [FreeTextEntry7] : starts as an itch in her throat and then gets really bad.  Starts when she sits down for a meal. [FreeTextEntry2] : No PND at this time, +SOB with stairs which is old, has a h/o lung fibrosis

## 2019-07-09 NOTE — ADDENDUM
[FreeTextEntry1] : 3/29/19:  Reviewed labs, TSH 10.2 with nl free T4 and antithyroid ab, but given degree of elevation of TSH and symptoms would treat subclinical hypothyroidism, LM to call back to discuss. \par 4/1/19:  Discussed above with pt, will start synthroid 50 mcg day and recheck 2 months.\par 5/24/19:  Reviewed CT chest with pt, unchanged nodule and fibrosis, coronary artery calcification, given elevated  will start lipitor 10, counseled on side effects, agreeable.\par 7/9/19:  TSH nl - mailed to pt, c/w above synthroid dose.

## 2019-07-09 NOTE — ASSESSMENT
[FreeTextEntry1] : 58-year-old female with a history of Hodgkin's lymphoma with radiation in the past here with come complaints of cough since developing a bronchitis in early May.  Cough is paroxysmal in nature and occurs after her throat gets itchy.  Has taken Zyrtec on occasion but finds that it makes her sleepy.  Other to histamines do not work for her.  No postnasal drip at this time.  Her CT scan done after she developed the symptoms was essentially unchanged with apical scarring and some bronchiectasis in the right upper lobe.  These results were discussed with the patient and she was made aware that she is more apt to develop a bronchitis and that treatment with antibiotics sooner rather than later may be necessary.  Her symptoms of a similar allergic in nature as they begin with throat itchiness while at the dinner table.  I told her to keep a diary to see if there is any common food that may be triggering this.  I also suggested to try and break this cycle that she should take Zyrtec every night for the next 2 weeks.  If her cough does not resolve would then refer to pulmonary for PFTs.  Further management pending her response to the Zyrtec.

## 2019-07-09 NOTE — DATA REVIEWED
[FreeTextEntry1] : Recent CT of the chest reviewed and demonstrates bronchiectasis in the right upper lobe and unchanged apical scarring versus fibrosis.  Patient with history of radiation

## 2019-07-09 NOTE — PHYSICAL EXAM
[No Acute Distress] : no acute distress [Well Nourished] : well nourished [Well Developed] : well developed [Well-Appearing] : well-appearing [PERRL] : pupils equal round and reactive to light [Normal Oropharynx] : the oropharynx was normal [Normal TMs] : both tympanic membranes were normal [Normal Nasal Mucosa] : the nasal mucosa was normal [Supple] : supple [No Lymphadenopathy] : no lymphadenopathy [Thyroid Normal, No Nodules] : the thyroid was normal and there were no nodules present [No Respiratory Distress] : no respiratory distress  [Clear to Auscultation] : lungs were clear to auscultation bilaterally [No Accessory Muscle Use] : no accessory muscle use [Normal Rate] : normal rate  [Normal S1, S2] : normal S1 and S2 [No Murmur] : no murmur heard [No Carotid Bruits] : no carotid bruits [Pedal Pulses Present] : the pedal pulses are present [No Edema] : there was no peripheral edema [Normal Appearance] : normal in appearance [No Nipple Discharge] : no nipple discharge [No Axillary Lymphadenopathy] : no axillary lymphadenopathy [Soft] : abdomen soft [Non Tender] : non-tender [Non-distended] : non-distended [No Masses] : no abdominal mass palpated [No HSM] : no HSM [Normal Bowel Sounds] : normal bowel sounds [Normal Supraclavicular Nodes] : no supraclavicular lymphadenopathy [Normal Axillary Nodes] : no axillary lymphadenopathy [Normal Posterior Cervical Nodes] : no posterior cervical lymphadenopathy [Normal Anterior Cervical Nodes] : no anterior cervical lymphadenopathy [Normal Inguinal Nodes] : no inguinal lymphadenopathy [No Joint Swelling] : no joint swelling [No Rash] : no rash [Normal Gait] : normal gait [Normal Affect] : the affect was normal [de-identified] : regular rhythm with premature beats [de-identified] : scattered moles (reports chronic)

## 2019-07-09 NOTE — HEALTH RISK ASSESSMENT
[0] : 2) Feeling down, depressed, or hopeless: Not at all (0) [MammogramComments] : utd [PapSmearComments] : utd [BoneDensityComments] : utd [ColonoscopyComments] : utd

## 2019-07-09 NOTE — HISTORY OF PRESENT ILLNESS
[FreeTextEntry1] : physical [de-identified] : 59 yo female with h/o as below here for CPE.\par Feels cold always in the evening, cold hands and cold feet.  Interested in treating thyroid.  Still having fatigue but otherwise feeling better.\par GI had told her that she has lactose intolerance, previously when would eat dairy would have bowel movements 20 minutes after it with queasiness, would still have dairy anyway.  Then recently, didn't have dairy, traveled to [place] last week, last weekend, had queasiness and bloating and gas with watery stool passage when would pass gas, now it went away, never had diarrhea.  \par Slight HA from time to time but not major.  \par Palpitations better than before.\par No other active issues.

## 2019-07-09 NOTE — PHYSICAL EXAM
[Supple] : supple [No Lymphadenopathy] : no lymphadenopathy [Normal] : no respiratory distress, lungs were clear to auscultation bilaterally and no accessory muscle use [No Extremity Clubbing/Cyanosis] : no extremity clubbing/cyanosis

## 2019-08-27 ENCOUNTER — RX RENEWAL (OUTPATIENT)
Age: 59
End: 2019-08-27

## 2019-11-07 ENCOUNTER — RX RENEWAL (OUTPATIENT)
Age: 59
End: 2019-11-07

## 2019-11-13 ENCOUNTER — MEDICATION RENEWAL (OUTPATIENT)
Age: 59
End: 2019-11-13

## 2019-11-15 ENCOUNTER — TRANSCRIPTION ENCOUNTER (OUTPATIENT)
Age: 59
End: 2019-11-15

## 2020-01-05 ENCOUNTER — TRANSCRIPTION ENCOUNTER (OUTPATIENT)
Age: 60
End: 2020-01-05

## 2020-01-06 ENCOUNTER — RX RENEWAL (OUTPATIENT)
Age: 60
End: 2020-01-06

## 2020-02-06 ENCOUNTER — RX RENEWAL (OUTPATIENT)
Age: 60
End: 2020-02-06

## 2020-02-10 ENCOUNTER — APPOINTMENT (OUTPATIENT)
Dept: OBGYN | Facility: CLINIC | Age: 60
End: 2020-02-10

## 2020-02-19 ENCOUNTER — APPOINTMENT (OUTPATIENT)
Dept: INTERNAL MEDICINE | Facility: CLINIC | Age: 60
End: 2020-02-19

## 2020-03-04 ENCOUNTER — APPOINTMENT (OUTPATIENT)
Dept: OBGYN | Facility: CLINIC | Age: 60
End: 2020-03-04
Payer: COMMERCIAL

## 2020-03-04 VITALS
WEIGHT: 111 LBS | SYSTOLIC BLOOD PRESSURE: 142 MMHG | BODY MASS INDEX: 21.79 KG/M2 | DIASTOLIC BLOOD PRESSURE: 84 MMHG | HEIGHT: 60 IN

## 2020-03-04 PROCEDURE — 99396 PREV VISIT EST AGE 40-64: CPT

## 2020-03-06 LAB — HPV HIGH+LOW RISK DNA PNL CVX: NOT DETECTED

## 2020-03-11 LAB — CYTOLOGY CVX/VAG DOC THIN PREP: ABNORMAL

## 2020-03-30 ENCOUNTER — APPOINTMENT (OUTPATIENT)
Dept: OBGYN | Facility: CLINIC | Age: 60
End: 2020-03-30

## 2020-05-18 NOTE — H&P PST ADULT - ANESTHESIA, PREVIOUS REACTION, PROFILE
What Type Of Note Output Would You Prefer (Optional)?: Standard Output Is The Patient Presenting As Previously Scheduled?: Yes How Severe Is Your Rash?: moderate Is This A New Presentation, Or A Follow-Up?: Rash none

## 2020-05-29 ENCOUNTER — APPOINTMENT (OUTPATIENT)
Dept: OBGYN | Facility: CLINIC | Age: 60
End: 2020-05-29
Payer: COMMERCIAL

## 2020-05-29 VITALS
DIASTOLIC BLOOD PRESSURE: 82 MMHG | SYSTOLIC BLOOD PRESSURE: 161 MMHG | HEIGHT: 60 IN | WEIGHT: 109 LBS | BODY MASS INDEX: 21.4 KG/M2

## 2020-05-29 DIAGNOSIS — R87.610 ATYPICAL SQUAMOUS CELLS OF UNDETERMINED SIGNIFICANCE ON CYTOLOGIC SMEAR OF CERVIX (ASC-US): ICD-10-CM

## 2020-05-29 LAB
BILIRUB UR QL STRIP: NORMAL
CLARITY UR: CLEAR
COLLECTION METHOD: NORMAL
GLUCOSE UR-MCNC: NORMAL
HCG UR QL: 0.2 EU/DL
HGB UR QL STRIP.AUTO: NORMAL
KETONES UR-MCNC: NORMAL
LEUKOCYTE ESTERASE UR QL STRIP: NORMAL
NITRITE UR QL STRIP: NORMAL
PH UR STRIP: 6.5
PROT UR STRIP-MCNC: NORMAL
SP GR UR STRIP: 1.01

## 2020-05-29 PROCEDURE — 57452 EXAM OF CERVIX W/SCOPE: CPT

## 2020-05-29 PROCEDURE — 81002 URINALYSIS NONAUTO W/O SCOPE: CPT

## 2020-05-29 NOTE — PROCEDURE
[Colposcopy] : colposcopy [Patient] : patient [Risks] : risks [Benefits] : benefits [Alternatives] : alternatives [Infection] : infection [Allergic Reaction] : allergic reaction [Bleeding] : bleeding [Pap Performed] : a cervical Pap smear was not performed [Consent Obtained] : written consent was obtained prior to the procedure [SCJ Fully Visulized] : the squamocolumnar junction was not fully visualized [Normal Staining] : normal staining [Biopsies Taken: # ___] : no biopsies were taken of the cervix [No Abnormalities] : no abnormalities seen [Tolerated Well] : the patient tolerated the procedure well [ECC Done] : Endocervical curettage was not performed. [FreeTextEntry9] : ASC-H [No Complications] : there were no complications

## 2020-05-30 LAB
CANDIDA VAG CYTO: NOT DETECTED
G VAGINALIS+PREV SP MTYP VAG QL MICRO: NOT DETECTED
T VAGINALIS VAG QL WET PREP: NOT DETECTED

## 2020-06-02 LAB — BACTERIA UR CULT: NORMAL

## 2020-06-10 ENCOUNTER — NON-APPOINTMENT (OUTPATIENT)
Age: 60
End: 2020-06-10

## 2020-07-27 ENCOUNTER — APPOINTMENT (OUTPATIENT)
Dept: INTERNAL MEDICINE | Facility: CLINIC | Age: 60
End: 2020-07-27
Payer: COMMERCIAL

## 2020-07-27 VITALS
BODY MASS INDEX: 23.16 KG/M2 | OXYGEN SATURATION: 98 % | HEIGHT: 60 IN | HEART RATE: 81 BPM | WEIGHT: 118 LBS | DIASTOLIC BLOOD PRESSURE: 80 MMHG | SYSTOLIC BLOOD PRESSURE: 140 MMHG

## 2020-07-27 DIAGNOSIS — Z86.69 PERSONAL HISTORY OF OTHER DISEASES OF THE NERVOUS SYSTEM AND SENSE ORGANS: ICD-10-CM

## 2020-07-27 DIAGNOSIS — R10.2 PELVIC AND PERINEAL PAIN: ICD-10-CM

## 2020-07-27 DIAGNOSIS — Z87.09 PERSONAL HISTORY OF OTHER DISEASES OF THE RESPIRATORY SYSTEM: ICD-10-CM

## 2020-07-27 PROCEDURE — 99396 PREV VISIT EST AGE 40-64: CPT

## 2020-07-27 RX ORDER — OFLOXACIN 3 MG/ML
0.3 SOLUTION/ DROPS OPHTHALMIC
Qty: 1 | Refills: 1 | Status: DISCONTINUED | COMMUNITY
Start: 2019-05-06 | End: 2020-07-27

## 2020-07-27 RX ORDER — ASCORBIC ACID 500 MG
TABLET ORAL
Refills: 0 | Status: ACTIVE | COMMUNITY

## 2020-07-27 NOTE — PAST MEDICAL HISTORY
[Menopause Age____] : age at menopause was [unfilled] [Postmenopausal] : history of menopause having occurred [Total Preg ___] : G: [unfilled] [Full Term ___] : Full Term: [unfilled]

## 2020-08-11 LAB
25(OH)D3 SERPL-MCNC: 37.3 NG/ML
ALBUMIN SERPL ELPH-MCNC: 4.7 G/DL
ALP BLD-CCNC: 73 U/L
ALT SERPL-CCNC: 15 U/L
ANION GAP SERPL CALC-SCNC: 17 MMOL/L
AST SERPL-CCNC: 21 U/L
BASOPHILS # BLD AUTO: 0.05 K/UL
BASOPHILS NFR BLD AUTO: 1 %
BILIRUB SERPL-MCNC: 0.5 MG/DL
BUN SERPL-MCNC: 20 MG/DL
CALCIUM SERPL-MCNC: 9.9 MG/DL
CHLORIDE SERPL-SCNC: 103 MMOL/L
CHOLEST SERPL-MCNC: 197 MG/DL
CHOLEST/HDLC SERPL: 2.1 RATIO
CO2 SERPL-SCNC: 25 MMOL/L
CREAT SERPL-MCNC: 0.87 MG/DL
EOSINOPHIL # BLD AUTO: 0.15 K/UL
EOSINOPHIL NFR BLD AUTO: 3.1 %
ESTIMATED AVERAGE GLUCOSE: 126 MG/DL
GLUCOSE SERPL-MCNC: 102 MG/DL
HBA1C MFR BLD HPLC: 6 %
HCT VFR BLD CALC: 41.2 %
HDLC SERPL-MCNC: 93 MG/DL
HGB BLD-MCNC: 13.4 G/DL
IMM GRANULOCYTES NFR BLD AUTO: 0.4 %
LDLC SERPL CALC-MCNC: 81 MG/DL
LYMPHOCYTES # BLD AUTO: 1.17 K/UL
LYMPHOCYTES NFR BLD AUTO: 24.2 %
MAN DIFF?: NORMAL
MCHC RBC-ENTMCNC: 30.8 PG
MCHC RBC-ENTMCNC: 32.5 GM/DL
MCV RBC AUTO: 94.7 FL
MONOCYTES # BLD AUTO: 0.41 K/UL
MONOCYTES NFR BLD AUTO: 8.5 %
NEUTROPHILS # BLD AUTO: 3.03 K/UL
NEUTROPHILS NFR BLD AUTO: 62.8 %
PLATELET # BLD AUTO: 269 K/UL
POTASSIUM SERPL-SCNC: 4.5 MMOL/L
PROT SERPL-MCNC: 7.3 G/DL
RBC # BLD: 4.35 M/UL
RBC # FLD: 13.1 %
SARS-COV-2 IGG SERPL IA-ACNC: 0.1 INDEX
SARS-COV-2 IGG SERPL QL IA: NEGATIVE
SODIUM SERPL-SCNC: 145 MMOL/L
TRIGL SERPL-MCNC: 112 MG/DL
TSH SERPL-ACNC: 1.66 UIU/ML
WBC # FLD AUTO: 4.83 K/UL

## 2020-08-11 NOTE — ADDENDUM
[FreeTextEntry1] : 8/11/20:  Labs reviewed, nl except hgbA1c 6.0 c/w stable preDM (to work on diet/exercise), other labs nl - mailed to pt.

## 2020-08-11 NOTE — HISTORY OF PRESENT ILLNESS
[FreeTextEntry1] : physical [de-identified] : 60 yo female with h/o as below here for CPE.\par Feeling well overall.  Would like to have labs done, lipids, sugar, etc.  Trying to work on diet.\par Had ?platelet rich plasma for right elbow (had tendon tear, tennis elbow), feels it is working for her.\par Went to Dr. Nolan.  Had UTI in [place] before, had abnl pap, had colposcopy and was ok.\par No other active issues.  Hasn't been to oncologist for routine f/up.

## 2020-08-11 NOTE — PHYSICAL EXAM
[No Acute Distress] : no acute distress [Well Nourished] : well nourished [Well Developed] : well developed [Well-Appearing] : well-appearing [PERRL] : pupils equal round and reactive to light [Normal Oropharynx] : the oropharynx was normal [Normal TMs] : both tympanic membranes were normal [No Lymphadenopathy] : no lymphadenopathy [Supple] : supple [Thyroid Normal, No Nodules] : the thyroid was normal and there were no nodules present [No Respiratory Distress] : no respiratory distress  [No Accessory Muscle Use] : no accessory muscle use [Clear to Auscultation] : lungs were clear to auscultation bilaterally [Normal Rate] : normal rate  [Normal S1, S2] : normal S1 and S2 [No Carotid Bruits] : no carotid bruits [Pedal Pulses Present] : the pedal pulses are present [No Edema] : there was no peripheral edema [Normal Appearance] : normal in appearance [No Nipple Discharge] : no nipple discharge [No Axillary Lymphadenopathy] : no axillary lymphadenopathy [Soft] : abdomen soft [Non Tender] : non-tender [Non-distended] : non-distended [No Masses] : no abdominal mass palpated [No HSM] : no HSM [Normal Bowel Sounds] : normal bowel sounds [Normal Supraclavicular Nodes] : no supraclavicular lymphadenopathy [Normal Axillary Nodes] : no axillary lymphadenopathy [Normal Posterior Cervical Nodes] : no posterior cervical lymphadenopathy [Normal Anterior Cervical Nodes] : no anterior cervical lymphadenopathy [Normal Inguinal Nodes] : no inguinal lymphadenopathy [No Joint Swelling] : no joint swelling [No Rash] : no rash [Normal Gait] : normal gait [Normal Affect] : the affect was normal [de-identified] : regular with slight variation; 2/6 BETH all 4 heart borders (chronic)

## 2020-08-11 NOTE — REVIEW OF SYSTEMS
[Recent Change In Weight] : ~T recent weight change [Vision Problems] : vision problems [Dysuria] : dysuria [Joint Pain] : joint pain [Vaginal Discharge] : vaginal discharge [Negative] : ENT [Fever] : no fever [Chills] : no chills [Fatigue] : no fatigue [Chest Pain] : no chest pain [Palpitations] : no palpitations [Shortness Of Breath] : no shortness of breath [Cough] : no cough [Abdominal Pain] : no abdominal pain [Nausea] : no nausea [Constipation] : no constipation [Diarrhea] : diarrhea [Vomiting] : no vomiting [Heartburn] : no heartburn [Melena] : no melena [Skin Rash] : no skin rash [Dizziness] : no dizziness [Fainting] : no fainting [Anxiety] : no anxiety [Depression] : no depression [Easy Bleeding] : no easy bleeding [Easy Bruising] : no easy bruising [Swollen Glands] : no swollen glands [FreeTextEntry2] : gained weight with diet [FreeTextEntry3] : will see optho [FreeTextEntry8] : occasional dysuria but ?from vaginal dryness; physiologic vaginal discharge

## 2020-08-11 NOTE — ASSESSMENT
[FreeTextEntry1] : 58 yo female with h/o as above including HTN, osteopenia, hyperlipidemia, preDM, subclinical hypothyroidism, lymphoma in remission, pulmonary fibrosis, arthritis, abnl paps with negative colposcopies, mild ZAKI, here for CPE.\par 1.  CV - bp borderline, will continue to monitor and pt has bp cuff at home, bp has been under good control previously so will c/w current regimen and work on diet/exercise, check bmp; check lipids; slight murmur on exam chronic and has mild aortic regurgitation, will consider repeat echo maybe next year after covid (no symptoms), had nl ECG last year showing slight sinus variation\par 2.  Endo - check TSH, A1c, vitamin D; due for DEXA, rx given\par 3.  Gyn - pap utd, awaiting mammo\par 4.  GI - colonoscopy utd\par 5.  Pulm - rx CT chest given to f/up pulm fibrosis\par 6. HCM - check below labs; consider shingrix next year after covid, other vaccines utd\par 7.  RTO 6 months TEB visit for bp check

## 2020-08-27 ENCOUNTER — APPOINTMENT (OUTPATIENT)
Dept: INTERNAL MEDICINE | Facility: CLINIC | Age: 60
End: 2020-08-27
Payer: COMMERCIAL

## 2020-08-27 DIAGNOSIS — M76.822 POSTERIOR TIBIAL TENDINITIS, LEFT LEG: ICD-10-CM

## 2020-08-27 DIAGNOSIS — G89.29 LOW BACK PAIN: ICD-10-CM

## 2020-08-27 DIAGNOSIS — M54.5 LOW BACK PAIN: ICD-10-CM

## 2020-08-27 DIAGNOSIS — M25.562 PAIN IN LEFT KNEE: ICD-10-CM

## 2020-08-27 PROCEDURE — 99213 OFFICE O/P EST LOW 20 MIN: CPT | Mod: 95

## 2020-09-07 PROBLEM — M54.5 CHRONIC LOW BACK PAIN: Status: ACTIVE | Noted: 2020-09-07

## 2020-09-07 PROBLEM — M76.822 POSTERIOR TIBIAL TENDINITIS OF LEFT LOWER EXTREMITY: Status: ACTIVE | Noted: 2020-09-07

## 2020-09-07 PROBLEM — M25.562 KNEE PAIN, LEFT: Status: ACTIVE | Noted: 2020-08-26

## 2020-09-07 NOTE — HISTORY OF PRESENT ILLNESS
[Home] : at home, [unfilled] , at the time of the visit. [Other Location: e.g. Home (Enter Location, City,State)___] : at [unfilled] [Verbal consent obtained from patient] : the patient, [unfilled] [FreeTextEntry8] : Reported left knee pain x few months, comes and goes, but worse in past few weeks and could not walk.\par Going down stairs is problem.\par Has a higher arch on left foot >right foot\par Walking more with quaranitne\par NO gardening\par Had requested rheum referral but onw will reocider seeing first orthotist /possiblly orthopesit

## 2020-09-21 ENCOUNTER — APPOINTMENT (OUTPATIENT)
Dept: RADIOLOGY | Facility: IMAGING CENTER | Age: 60
End: 2020-09-21
Payer: COMMERCIAL

## 2020-09-21 ENCOUNTER — OUTPATIENT (OUTPATIENT)
Dept: OUTPATIENT SERVICES | Facility: HOSPITAL | Age: 60
LOS: 1 days | End: 2020-09-21
Payer: COMMERCIAL

## 2020-09-21 DIAGNOSIS — Z98.891 HISTORY OF UTERINE SCAR FROM PREVIOUS SURGERY: Chronic | ICD-10-CM

## 2020-09-21 DIAGNOSIS — M25.562 PAIN IN LEFT KNEE: ICD-10-CM

## 2020-09-21 DIAGNOSIS — Z00.8 ENCOUNTER FOR OTHER GENERAL EXAMINATION: ICD-10-CM

## 2020-09-21 DIAGNOSIS — M54.5 LOW BACK PAIN: ICD-10-CM

## 2020-09-21 DIAGNOSIS — Z98.890 OTHER SPECIFIED POSTPROCEDURAL STATES: Chronic | ICD-10-CM

## 2020-09-21 PROCEDURE — 72100 X-RAY EXAM L-S SPINE 2/3 VWS: CPT | Mod: 26

## 2020-09-21 PROCEDURE — 72100 X-RAY EXAM L-S SPINE 2/3 VWS: CPT

## 2020-09-21 PROCEDURE — 73564 X-RAY EXAM KNEE 4 OR MORE: CPT

## 2020-09-21 PROCEDURE — 73564 X-RAY EXAM KNEE 4 OR MORE: CPT | Mod: 26,LT

## 2020-09-22 NOTE — H&P PST ADULT - NS PRO LAST MENSTRUAL DATE
Patient requesting refill of medication.   Medication has been loaded for review.   Please Fax to local pharmacy. Patient will check with pharmacy in 24 to 48 hours   Comments:      52y/o

## 2020-09-30 ENCOUNTER — NON-APPOINTMENT (OUTPATIENT)
Age: 60
End: 2020-09-30

## 2020-09-30 ENCOUNTER — APPOINTMENT (OUTPATIENT)
Dept: OPHTHALMOLOGY | Facility: CLINIC | Age: 60
End: 2020-09-30
Payer: COMMERCIAL

## 2020-09-30 PROCEDURE — 92134 CPTRZ OPH DX IMG PST SGM RTA: CPT

## 2020-09-30 PROCEDURE — 92014 COMPRE OPH EXAM EST PT 1/>: CPT

## 2020-09-30 PROCEDURE — 92136 OPHTHALMIC BIOMETRY: CPT

## 2020-10-30 NOTE — H&P PST ADULT - MALLAMPATI CLASS
Patient presents to ED for hypoxemia to 80s on 3L NC (her usual O2) initially tachypneic. . CXR w/ slight interval increase in pleural effusions right > left. Hypoxemia likely 2/2 to pleural effusions. Currently satting well on home 3L NC  RESOLVED  - s/p pleuroscopy and pleurodesis  - plan as above Class II - visualization of the soft palate, fauces, and uvula

## 2021-01-06 ENCOUNTER — RX RENEWAL (OUTPATIENT)
Age: 61
End: 2021-01-06

## 2021-01-20 ENCOUNTER — RX RENEWAL (OUTPATIENT)
Age: 61
End: 2021-01-20

## 2021-02-24 ENCOUNTER — RX RENEWAL (OUTPATIENT)
Age: 61
End: 2021-02-24

## 2021-03-05 ENCOUNTER — NON-APPOINTMENT (OUTPATIENT)
Age: 61
End: 2021-03-05

## 2021-03-25 ENCOUNTER — RX RENEWAL (OUTPATIENT)
Age: 61
End: 2021-03-25

## 2021-04-10 NOTE — H&P PST ADULT - NSALCOHOLAMT_GEN_A_CORE_SD
Procedure Date: 04/09/2021      SURGEON  Maddie Vasquez MD     PREOPERATIVE DIAGNOSES:   1.  Thickened endometrium.   2.  Postmenopausal bleeding.      POSTOPERATIVE DIAGNOSES:   1.  Thickened endometrium.   2.  Postmenopausal bleeding.      ESTIMATED BLOOD LOSS:  15 mL      URINE OUTPUT:  Not collected.      IV FLUIDS:  700 mL      DEFICIT:  190 mL, hysteroscopic.      ANESTHESIA:  General LMA.      ANTIBIOTICS:  None indicated.      SPECIMENS:   1.  Endometrial and cervical polyp.   2.  Endometrial curettings.      FINDINGS:   1.  External genitalia normal.   2.  Freely mobile normal-sized uterus, normal-appearing cervix.   3.  Polypoid structure in the mid to upper cervical canal that appears to be irregular and with vascularity.   4.  Polyp seen in the anterior surface of the uterine cavity, otherwise normal-appearing uterus and ostia bilaterally.      INDICATIONS:  Pinky is a 52-year-old female who presented to her primary care physician for postmenopausal bleeding.  Last menstrual period was at 49 years old and she had no bleeding until 02/28/2021 that was heavy like a period and she was passing golf ball size clots.  She is not on hormone replacement therapy but past medical history is complicated by morbid obesity, diabetes, high blood pressure and history of abnormal Pap smears.      The patient was recommended to have an endometrial biopsy performed due to thickened endometrium.  Endometrial biopsy showed disordered proliferative endometrium with mild irregularity and dilation, but without features of atypical hyperplasia or malignancy.  Pap smear done at our office showed negative cytology and HPV negative.  At her other provider's office, it was insufficient and HPV positive.      Due to multiple risk factors of hyperplasia and endometrial cancer, recommendation was to proceed with an operative hysteroscopy with polypectomy and dilation and curettage.  Risks and benefits were discussed with the patient  including risk of bleeding, injury to surrounding organs or genitalia, infection, need for additional surgical procedures, risk of anesthesia.  The patient consented and wished to proceed with the procedure.      DISCUSSION:  The patient was taken back to the operating room where a timeout was performed to confirm correct patient and correct procedure.  The patient was established under general anesthesia with an LMA.  No antibiotics were required.  The patient was positioned in dorsal lithotomy position and prepped and draped in the usual sterile fashion.  Bimanual exam was performed and uterus found to be freely mobile and midline and the cervix was normal.  Speculum was placed into the vagina.  Anterior lip of the cervix was grasped with a single-tooth tenaculum.  The cervix was dilated up until the hysteroscope would be able to be passed, approximately 8 mm.  The hysteroscope was then passed through the external cervical os and advanced to the fundus and the cervical polyp was seen and appeared to have vascularity and was irregular.  There was fluffy endometrium seen on the anterior surface and lateral surface of the right uterus and a polypoid structure in the anterior portion.  Otherwise, endometrium appeared normal and ostia were seen and appeared normal.        The MyoSure device was then placed through the working channel of the hysteroscope and was used to excise the abnormal-appearing tissue in the anterior and right lateral portion of the uterus.  A generalized sampling of the endometrium was also performed at this time with the MyoSure device.  As the scope was withdrawn, the endocervical polyp was also excised with the MyoSure device.  The scope was removed.  Sharp dilation was then performed using a sharp curette in all 4 quadrants until a gritty surface was achieved.  All specimens were sent to pathology.        The patient tolerated the procedure well.  The speculum was removed after the tenaculum  was removed and tenaculum sites were found to be hemostatic.  The patient was transferred to the PACU in stable condition.  All findings were discussed with the patient's mother through the phone due to COVID restrictions.  The patient was previously instructed to up in clinic in 2-4 weeks after surgery.         MALENA PITTMAN MD             D: 04/10/2021   T: 04/10/2021   MT: LARRY      Name:     RADHA JACOBO   MRN:      [id number]        Account:        [id number]   :      1968           Procedure Date: 2021      Document: [id number]       1-2 drinks

## 2021-04-22 ENCOUNTER — APPOINTMENT (OUTPATIENT)
Dept: OBGYN | Facility: CLINIC | Age: 61
End: 2021-04-22
Payer: COMMERCIAL

## 2021-04-22 VITALS
SYSTOLIC BLOOD PRESSURE: 150 MMHG | BODY MASS INDEX: 23.95 KG/M2 | WEIGHT: 122 LBS | DIASTOLIC BLOOD PRESSURE: 80 MMHG | HEIGHT: 60 IN

## 2021-04-22 VITALS — TEMPERATURE: 97.5 F

## 2021-04-22 DIAGNOSIS — N95.2 POSTMENOPAUSAL ATROPHIC VAGINITIS: ICD-10-CM

## 2021-04-22 PROCEDURE — 99072 ADDL SUPL MATRL&STAF TM PHE: CPT

## 2021-04-22 PROCEDURE — 99396 PREV VISIT EST AGE 40-64: CPT

## 2021-04-22 NOTE — HISTORY OF PRESENT ILLNESS
[Previously active] : previously active [Mammogramdate] : 9/20 [PapSmeardate] : 2020- [facility] [BoneDensityDate] : 2017 [ColonoscopyDate] : 2014

## 2021-04-22 NOTE — PHYSICAL EXAM
[Appropriately responsive] : appropriately responsive [Alert] : alert [No Acute Distress] : no acute distress [No Lymphadenopathy] : no lymphadenopathy [Soft] : soft [Non-tender] : non-tender [Non-distended] : non-distended [No HSM] : No HSM [No Lesions] : no lesions [No Mass] : no mass [Oriented x3] : oriented x3 [Examination Of The Breasts] : a normal appearance [No Masses] : no breast masses were palpable [Labia Majora] : normal [Labia Minora] : normal [Atrophy] : atrophy [Normal] : normal [Uterine Adnexae] : normal [FreeTextEntry4] : SEVERE VAG ATROPHY

## 2021-04-23 LAB — HPV HIGH+LOW RISK DNA PNL CVX: NOT DETECTED

## 2021-04-26 ENCOUNTER — NON-APPOINTMENT (OUTPATIENT)
Age: 61
End: 2021-04-26

## 2021-04-26 ENCOUNTER — APPOINTMENT (OUTPATIENT)
Dept: OPHTHALMOLOGY | Facility: CLINIC | Age: 61
End: 2021-04-26
Payer: COMMERCIAL

## 2021-04-26 PROCEDURE — 92134 CPTRZ OPH DX IMG PST SGM RTA: CPT

## 2021-04-26 PROCEDURE — 92025 CPTRIZED CORNEAL TOPOGRAPHY: CPT

## 2021-04-26 PROCEDURE — 92136 OPHTHALMIC BIOMETRY: CPT

## 2021-04-26 PROCEDURE — 92014 COMPRE OPH EXAM EST PT 1/>: CPT

## 2021-04-26 PROCEDURE — 99072 ADDL SUPL MATRL&STAF TM PHE: CPT

## 2021-05-11 LAB — CYTOLOGY CVX/VAG DOC THIN PREP: ABNORMAL

## 2021-05-27 ENCOUNTER — RX RENEWAL (OUTPATIENT)
Age: 61
End: 2021-05-27

## 2021-06-28 ENCOUNTER — APPOINTMENT (OUTPATIENT)
Dept: INTERNAL MEDICINE | Facility: CLINIC | Age: 61
End: 2021-06-28
Payer: COMMERCIAL

## 2021-06-28 ENCOUNTER — NON-APPOINTMENT (OUTPATIENT)
Age: 61
End: 2021-06-28

## 2021-06-28 VITALS
HEART RATE: 91 BPM | DIASTOLIC BLOOD PRESSURE: 80 MMHG | WEIGHT: 124 LBS | SYSTOLIC BLOOD PRESSURE: 150 MMHG | HEIGHT: 60 IN | OXYGEN SATURATION: 98 % | BODY MASS INDEX: 24.35 KG/M2

## 2021-06-28 DIAGNOSIS — Z92.3 PERSONAL HISTORY OF IRRADIATION: ICD-10-CM

## 2021-06-28 DIAGNOSIS — Z85.79 PERSONAL HISTORY OF OTHER MALIGNANT NEOPLASMS OF LYMPHOID, HEMATOPOIETIC AND RELATED TISSUES: ICD-10-CM

## 2021-06-28 PROCEDURE — 93000 ELECTROCARDIOGRAM COMPLETE: CPT | Mod: 59

## 2021-06-28 PROCEDURE — 99072 ADDL SUPL MATRL&STAF TM PHE: CPT

## 2021-06-28 PROCEDURE — 99214 OFFICE O/P EST MOD 30 MIN: CPT | Mod: 25

## 2021-06-28 RX ORDER — BLOOD PRESSURE TEST KIT-LARGE
KIT MISCELLANEOUS
Qty: 1 | Refills: 0 | Status: ACTIVE | COMMUNITY
Start: 2021-06-28 | End: 1900-01-01

## 2021-06-28 NOTE — PHYSICAL EXAM
[No Edema] : there was no peripheral edema [No Extremity Clubbing/Cyanosis] : no extremity clubbing/cyanosis [Normal Supraclavicular Nodes] : no supraclavicular lymphadenopathy [Coordination Grossly Intact] : coordination grossly intact [No Focal Deficits] : no focal deficits [Normal Gait] : normal gait [Normal] : affect was normal and insight and judgment were intact

## 2021-06-28 NOTE — REVIEW OF SYSTEMS
[Fever] : no fever [Chills] : no chills [Chest Pain] : no chest pain [Palpitations] : palpitations [Lower Ext Edema] : lower extremity edema [Orthopnea] : orthopnea [Shortness Of Breath] : no shortness of breath [Cough] : cough [Dyspnea on Exertion] : dyspnea on exertion [Abdominal Pain] : no abdominal pain [Negative] : Musculoskeletal

## 2021-06-28 NOTE — ASSESSMENT
[FreeTextEntry1] : 60F with diastolic dysfunction, prediabetes, history of Hodgkin's lymphoma s/p RT and chemotherapy, HTN, ZAKI using OAT, osteopenia, pulmonary fibrosis and subclinical hypothyroidism, cervical dysplasia following with Dr. Nolan presents for acute visit for coughing and dyspnea on exertion. \par \par 1. Dyspnea on exertion\par -repeat TTE given documented hx of diasolic dysfunction. \par -Stress test 12/27/17 - mild mitral regurgitation, normal LVSF, normal diastolic dysfunction, minimal tricuspid regurgitation, normal pulmonary pressures. Normal stress test. \par -pulmonary fibrosis hx: CT chest ordered but not yet completed. \par -check EKG - frequent PAC but sinus rhythm. Cardiology referral as patient experiencing palpitations, and seems like significant amount of PAC could patient possibly be a candidate for ablation?\par -pulm referral given fibrosis hx/RT hx\par \par 2. HTN\par -on metoprolol 50 mg and hctz 12.5 mg; bp not controlled on current medications\par -increased hctz to 25 mg daily; will need repeat BMP\par \par 3. Hx of Hodgkins' Lymphoma\par -last f/u with [facility] was in 2017; will ask to f/u with new provider as Dr. Doan has changed practices\par -referral to Dr. Moore\par \par 4. Cervical dysplasia\par -Reviewed note from Dr. Nolan 4/22/21 - unable to perform exam due to atrophic vagina and bleeding to f/u in 1-2 months and scheduled for 6/29/21 colposcopy\par \par 5. HCM\par -colonoscopy from: 2014\par -cervical cancer screening: see above; has appt tomorrow\par -completed COVID vaccine\par -mammogram ordered; pt to schedule and provided with print out of DEXA as well\par -HIV/HCV negative in the past\par -up to date on tdap and pneumovax. Needs shingrix

## 2021-06-28 NOTE — HISTORY OF PRESENT ILLNESS
[FreeTextEntry8] : 60F with diastolic dysfunction, prediabetes, history of Hodgkin's lymphoma s/p RT and chemotherapy, HTN, ZAKI using OAT, osteopenia, pulmonary fibrosis and subclinical hypothyroidism, cervical dysplasia following with Dr. Nolan presents for acute visit for coughing and dyspnea on exertion. \par \par She last saw her primary care doctor, Dr. Blount on 7/27/2020 for her annual visit; he next visit is planned for 9/20/21. Reviewed note from 8/27/2020 with Dr. Mendoza  for left knee pain - thought to be 2/2 arch asymmetry and was referred to podiatry. Reviewed notes from Dr. Nolan 4/22/21 and 5/11/21. Last f/u with heme onc was 10/30/17 Dr. Doan and was recommended to f/u in 1 year. \par \par Pt states she feels pretty good but she came in because she has hx of pulmonary fibrosis 2/2 chemo. She has been coughing constantly during the day and sometimes at night, not always. Not sure if from fibrosis or anything else. When she goes up hill or up stairs, at the top she is really winded. Sometimes there is phlegm; has not seen it. No fever, no chest pain. Feels SOB on lying flat. Thinks it could be related to bloating or gas. Happened twice that she wakes up in early night and cannot breathe.

## 2021-06-29 ENCOUNTER — APPOINTMENT (OUTPATIENT)
Dept: OBGYN | Facility: CLINIC | Age: 61
End: 2021-06-29
Payer: COMMERCIAL

## 2021-06-29 VITALS
DIASTOLIC BLOOD PRESSURE: 70 MMHG | SYSTOLIC BLOOD PRESSURE: 130 MMHG | WEIGHT: 121.25 LBS | BODY MASS INDEX: 23.81 KG/M2 | HEIGHT: 60 IN

## 2021-06-29 VITALS — TEMPERATURE: 96.2 F

## 2021-06-29 LAB
ALBUMIN SERPL ELPH-MCNC: 4.4 G/DL
ALP BLD-CCNC: 83 U/L
ALT SERPL-CCNC: 17 U/L
ANION GAP SERPL CALC-SCNC: 9 MMOL/L
AST SERPL-CCNC: 23 U/L
BASOPHILS # BLD AUTO: 0.03 K/UL
BASOPHILS NFR BLD AUTO: 0.6 %
BILIRUB SERPL-MCNC: 0.6 MG/DL
BUN SERPL-MCNC: 14 MG/DL
CALCIUM SERPL-MCNC: 10.1 MG/DL
CHLORIDE SERPL-SCNC: 103 MMOL/L
CHOLEST SERPL-MCNC: 186 MG/DL
CO2 SERPL-SCNC: 30 MMOL/L
CREAT SERPL-MCNC: 0.59 MG/DL
EOSINOPHIL # BLD AUTO: 0.18 K/UL
EOSINOPHIL NFR BLD AUTO: 3.4 %
ESTIMATED AVERAGE GLUCOSE: 131 MG/DL
GLUCOSE SERPL-MCNC: 171 MG/DL
HBA1C MFR BLD HPLC: 6.2 %
HCT VFR BLD CALC: 41.3 %
HDLC SERPL-MCNC: 89 MG/DL
HGB BLD-MCNC: 13.2 G/DL
IMM GRANULOCYTES NFR BLD AUTO: 0.4 %
LDLC SERPL CALC-MCNC: 84 MG/DL
LYMPHOCYTES # BLD AUTO: 1.23 K/UL
LYMPHOCYTES NFR BLD AUTO: 23.6 %
MAN DIFF?: NORMAL
MCHC RBC-ENTMCNC: 29.7 PG
MCHC RBC-ENTMCNC: 32 GM/DL
MCV RBC AUTO: 92.8 FL
MONOCYTES # BLD AUTO: 0.4 K/UL
MONOCYTES NFR BLD AUTO: 7.7 %
NEUTROPHILS # BLD AUTO: 3.36 K/UL
NEUTROPHILS NFR BLD AUTO: 64.3 %
NONHDLC SERPL-MCNC: 97 MG/DL
PLATELET # BLD AUTO: 284 K/UL
POTASSIUM SERPL-SCNC: 4.1 MMOL/L
PROT SERPL-MCNC: 7.1 G/DL
RBC # BLD: 4.45 M/UL
RBC # FLD: 13.1 %
SODIUM SERPL-SCNC: 142 MMOL/L
T4 FREE SERPL-MCNC: 1.1 NG/DL
TRIGL SERPL-MCNC: 65 MG/DL
TSH SERPL-ACNC: 2.71 UIU/ML
WBC # FLD AUTO: 5.22 K/UL

## 2021-06-29 PROCEDURE — 57452 EXAM OF CERVIX W/SCOPE: CPT

## 2021-06-29 PROCEDURE — 99072 ADDL SUPL MATRL&STAF TM PHE: CPT

## 2021-06-29 NOTE — PROCEDURE
[Colposcopy] : Colposcopy  [Time out performed] : Pre-procedure time out performed.  Patient's name, date of birth and procedure confirmed. [Consent Obtained] : Consent obtained [Risks] : risks [Benefits] : benefits [Alternatives] : alternatives [Patient] : patient [Infection] : infection [Bleeding] : bleeding [Allergic Reaction] : allergic reaction [No Premedication] : no premedication [Pap Performed] : pap not performed [SCI Fully Visualized] : SCI not fully visualized [ECC Performed] : ECC not performed [No Abnormalities] : no abnormalities [Biopsy] : biopsy not taken [de-identified] : ASC-H W NEG HPV [de-identified] : SCARRED CERVIX S/P CONE BX.  ECC UNABLE TO BE PERFORMED DUE TO SCARRED OS [de-identified] : NO AW AREAS ON CERVIX OR VAGINAL WALLS.  LUGOLS USED AND TAKEN UP BY ALL AREAS

## 2021-07-19 ENCOUNTER — APPOINTMENT (OUTPATIENT)
Dept: MAMMOGRAPHY | Facility: CLINIC | Age: 61
End: 2021-07-19
Payer: COMMERCIAL

## 2021-07-19 ENCOUNTER — RESULT REVIEW (OUTPATIENT)
Age: 61
End: 2021-07-19

## 2021-07-19 ENCOUNTER — APPOINTMENT (OUTPATIENT)
Dept: RADIOLOGY | Facility: CLINIC | Age: 61
End: 2021-07-19
Payer: COMMERCIAL

## 2021-07-19 PROCEDURE — 77063 BREAST TOMOSYNTHESIS BI: CPT

## 2021-07-19 PROCEDURE — 77086 VRT FRACTURE ASSMT VIA DXA: CPT

## 2021-07-19 PROCEDURE — 77067 SCR MAMMO BI INCL CAD: CPT

## 2021-07-20 ENCOUNTER — NON-APPOINTMENT (OUTPATIENT)
Age: 61
End: 2021-07-20

## 2021-07-20 ENCOUNTER — APPOINTMENT (OUTPATIENT)
Dept: CARDIOLOGY | Facility: CLINIC | Age: 61
End: 2021-07-20
Payer: COMMERCIAL

## 2021-07-20 VITALS
SYSTOLIC BLOOD PRESSURE: 139 MMHG | WEIGHT: 118 LBS | BODY MASS INDEX: 23.16 KG/M2 | HEIGHT: 60 IN | DIASTOLIC BLOOD PRESSURE: 79 MMHG | OXYGEN SATURATION: 97 % | HEART RATE: 82 BPM

## 2021-07-20 DIAGNOSIS — Z92.21 PERSONAL HISTORY OF ANTINEOPLASTIC CHEMOTHERAPY: ICD-10-CM

## 2021-07-20 PROCEDURE — 99072 ADDL SUPL MATRL&STAF TM PHE: CPT

## 2021-07-20 PROCEDURE — 93000 ELECTROCARDIOGRAM COMPLETE: CPT

## 2021-07-20 PROCEDURE — 99244 OFF/OP CNSLTJ NEW/EST MOD 40: CPT

## 2021-07-20 RX ORDER — ACETAMINOPHEN 325 MG
TABLET ORAL
Refills: 0 | Status: DISCONTINUED | COMMUNITY
End: 2021-07-20

## 2021-07-21 NOTE — REVIEW OF SYSTEMS
[SOB] : shortness of breath [Dyspnea on exertion] : dyspnea during exertion [Negative] : Heme/Lymph [Lower Ext Edema] : no extremity edema [Leg Claudication] : no intermittent leg claudication [Palpitations] : no palpitations [Syncope] : no syncope

## 2021-07-21 NOTE — DISCUSSION/SUMMARY
[FreeTextEntry1] : The patient is a 60-year-old female Hodgkins s/p chemo/rad years ago, hypothyroid, htn, hyperlipidemia, ZAKI, pulmonary fibrosis with increasing shortness of breath. \par #1 CV- normal ECG, echo and exercise stress test\par #2 Htn- continue HCTZ and metoprolol for now\par #3 Lipids- continue atorvastatin\par #4 Hypothyroid- continue levothyroxine\par #5 Pulm- ZAKI with nose strip, mild pulm fibrosis which could be etiology, f/u Dr. Reid\par #6 Onc- remote Hodgkins treated with chemo and radiation, increases risk for pulm and CV disease

## 2021-07-21 NOTE — HISTORY OF PRESENT ILLNESS
[FreeTextEntry1] : Vannesa is a 60-year-old female Hodgkins s/p chemo/rad years ago, hypothyroid, htn, hyperlipidemia, ZAKI, palpitations with shortness of breath. She had ECHO 2013  normal. She has had difficulty breathing but noticed on stairs getting worse. Twice woke up from sleep gasping for air. She was diagnosed with mild pulmonary fibrosis and has upcoming appt with Dr. Reid.

## 2021-07-25 ENCOUNTER — RX RENEWAL (OUTPATIENT)
Age: 61
End: 2021-07-25

## 2021-07-27 ENCOUNTER — APPOINTMENT (OUTPATIENT)
Dept: CV DIAGNOSITCS | Facility: HOSPITAL | Age: 61
End: 2021-07-27

## 2021-07-27 ENCOUNTER — OUTPATIENT (OUTPATIENT)
Dept: OUTPATIENT SERVICES | Facility: HOSPITAL | Age: 61
LOS: 1 days | End: 2021-07-27
Payer: COMMERCIAL

## 2021-07-27 DIAGNOSIS — I25.10 ATHEROSCLEROTIC HEART DISEASE OF NATIVE CORONARY ARTERY WITHOUT ANGINA PECTORIS: ICD-10-CM

## 2021-07-27 DIAGNOSIS — Z98.890 OTHER SPECIFIED POSTPROCEDURAL STATES: Chronic | ICD-10-CM

## 2021-07-27 DIAGNOSIS — Z98.891 HISTORY OF UTERINE SCAR FROM PREVIOUS SURGERY: Chronic | ICD-10-CM

## 2021-07-27 PROCEDURE — 93018 CV STRESS TEST I&R ONLY: CPT

## 2021-07-27 PROCEDURE — 93017 CV STRESS TEST TRACING ONLY: CPT

## 2021-07-27 PROCEDURE — 93306 TTE W/DOPPLER COMPLETE: CPT | Mod: 26

## 2021-07-27 PROCEDURE — 93016 CV STRESS TEST SUPVJ ONLY: CPT

## 2021-07-27 PROCEDURE — 93306 TTE W/DOPPLER COMPLETE: CPT

## 2021-08-02 ENCOUNTER — APPOINTMENT (OUTPATIENT)
Dept: PULMONOLOGY | Facility: CLINIC | Age: 61
End: 2021-08-02
Payer: COMMERCIAL

## 2021-08-02 VITALS
HEART RATE: 71 BPM | SYSTOLIC BLOOD PRESSURE: 182 MMHG | DIASTOLIC BLOOD PRESSURE: 79 MMHG | WEIGHT: 120 LBS | HEIGHT: 60 IN | TEMPERATURE: 98.2 F | BODY MASS INDEX: 23.56 KG/M2

## 2021-08-02 PROCEDURE — 94729 DIFFUSING CAPACITY: CPT

## 2021-08-02 PROCEDURE — ZZZZZ: CPT

## 2021-08-02 PROCEDURE — 94010 BREATHING CAPACITY TEST: CPT

## 2021-08-02 PROCEDURE — 99214 OFFICE O/P EST MOD 30 MIN: CPT | Mod: 25

## 2021-08-02 PROCEDURE — 94726 PLETHYSMOGRAPHY LUNG VOLUMES: CPT

## 2021-08-02 NOTE — HISTORY OF PRESENT ILLNESS
[Never] : never [TextBox_4] : 60 year-old F with PMH diastolic dysfunction, Hodgkin's lymphoma s/p RT and chemotherapy, HTN, ZAKI on OAT, osteopenia, and pulmonary fibrosis who presents to the clinic for re-establishment of care. She reports that recently she has been having shortness of breath with exertion. She reports that this has been happening for many years but it has gotten worse this year. She reports that her exertion also exacerbates a cough, nonproductive. She denies chest pain with these episodes. She had a stress test performed which was terminated due to a low exercise tolerance. She states that she has recently begun walking (last Wednesday) after the stress test and reports that she was able to walk for 1 hour on a flat surface with no issues. She has since continued this and reports that her distance has increased.

## 2021-08-02 NOTE — ASSESSMENT
[FreeTextEntry1] : 60 year-old F with PMH Hodgkin's lymphoma s/p RT and chemotherapy and pulmonary fibrosis who presented to the clinic for re-establishment of care and evaluation of dyspnea on exertion.\par \par Dyspnea on Exertion\par - PFTs reviewed, patient has no evidence of obstructive or restrictive ventilatory defects\par - Previous CT Chest review, small area of R apical scarring\par - She has recently been able to walk an hour each day without dyspnea or coughing, and has improved her distance with each trip\par - Given normal PFTs, would continue to monitor for now. History of pulmonary disease is not likely contributory to current symptoms\par - Patient can return to clinic in 1 year for repeat PFTs or earlier PRN\par \par Case discussed with Dr. Dennis\par \par Luis Diaz, PGY-6\par Pulmonary and Critical Care Medicine

## 2021-08-02 NOTE — PHYSICAL EXAM
[No Acute Distress] : no acute distress [Normal Oropharynx] : normal oropharynx [Normal Appearance] : normal appearance [No Neck Mass] : no neck mass [Normal Rate/Rhythm] : normal rate/rhythm [Normal S1, S2] : normal s1, s2 [No Resp Distress] : no resp distress [No Abnormalities] : no abnormalities [Benign] : benign [Normal Gait] : normal gait [No Clubbing] : no clubbing [No Edema] : no edema [Normal Color/ Pigmentation] : normal color/ pigmentation [No Focal Deficits] : no focal deficits [Oriented x3] : oriented x3 [Normal Affect] : normal affect

## 2021-08-02 NOTE — REVIEW OF SYSTEMS
[SOB on Exertion] : sob on exertion [Negative] : Endocrine [Cough] : no cough [Dyspnea] : no dyspnea

## 2021-08-15 ENCOUNTER — OUTPATIENT (OUTPATIENT)
Dept: OUTPATIENT SERVICES | Facility: HOSPITAL | Age: 61
LOS: 1 days | Discharge: HOME | End: 2021-08-15

## 2021-08-15 DIAGNOSIS — Z98.891 HISTORY OF UTERINE SCAR FROM PREVIOUS SURGERY: Chronic | ICD-10-CM

## 2021-08-15 DIAGNOSIS — Z98.890 OTHER SPECIFIED POSTPROCEDURAL STATES: Chronic | ICD-10-CM

## 2021-08-15 DIAGNOSIS — D64.9 ANEMIA, UNSPECIFIED: ICD-10-CM

## 2021-08-16 ENCOUNTER — APPOINTMENT (OUTPATIENT)
Dept: HEMATOLOGY ONCOLOGY | Facility: CLINIC | Age: 61
End: 2021-08-16

## 2021-09-17 ENCOUNTER — NON-APPOINTMENT (OUTPATIENT)
Age: 61
End: 2021-09-17

## 2021-09-20 ENCOUNTER — APPOINTMENT (OUTPATIENT)
Dept: INTERNAL MEDICINE | Facility: CLINIC | Age: 61
End: 2021-09-20
Payer: COMMERCIAL

## 2021-09-20 VITALS
OXYGEN SATURATION: 96 % | DIASTOLIC BLOOD PRESSURE: 70 MMHG | HEART RATE: 81 BPM | BODY MASS INDEX: 24.54 KG/M2 | WEIGHT: 125 LBS | SYSTOLIC BLOOD PRESSURE: 130 MMHG | HEIGHT: 60 IN

## 2021-09-20 DIAGNOSIS — Z11.59 ENCOUNTER FOR SCREENING FOR OTHER VIRAL DISEASES: ICD-10-CM

## 2021-09-20 DIAGNOSIS — Z01.419 ENCOUNTER FOR GYNECOLOGICAL EXAMINATION (GENERAL) (ROUTINE) W/OUT ABNORMAL FINDINGS: ICD-10-CM

## 2021-09-20 DIAGNOSIS — Z87.898 PERSONAL HISTORY OF OTHER SPECIFIED CONDITIONS: ICD-10-CM

## 2021-09-20 PROCEDURE — 99396 PREV VISIT EST AGE 40-64: CPT

## 2021-09-26 LAB
ANION GAP SERPL CALC-SCNC: 17 MMOL/L
BUN SERPL-MCNC: 16 MG/DL
CALCIUM SERPL-MCNC: 9.5 MG/DL
CHLORIDE SERPL-SCNC: 106 MMOL/L
CO2 SERPL-SCNC: 18 MMOL/L
CREAT SERPL-MCNC: 0.66 MG/DL
GLUCOSE SERPL-MCNC: 104 MG/DL
POTASSIUM SERPL-SCNC: 4.2 MMOL/L
SODIUM SERPL-SCNC: 140 MMOL/L

## 2021-09-26 NOTE — REVIEW OF SYSTEMS
[Recent Change In Weight] : ~T recent weight change [Palpitations] : palpitations [Cough] : cough [Joint Pain] : joint pain [Negative] : ENT [Fever] : no fever [Chills] : no chills [Fatigue] : no fatigue [Chest Pain] : no chest pain [Shortness Of Breath] : no shortness of breath [Abdominal Pain] : no abdominal pain [Constipation] : no constipation [Nausea] : no nausea [Diarrhea] : diarrhea [Vomiting] : no vomiting [Heartburn] : no heartburn [Melena] : no melena [Dysuria] : no dysuria [Vaginal Discharge] : no vaginal discharge [Skin Rash] : no skin rash [Fainting] : no fainting [Dizziness] : no dizziness [Anxiety] : no anxiety [Depression] : no depression [Easy Bleeding] : no easy bleeding [Easy Bruising] : no easy bruising [Swollen Glands] : no swollen glands [FreeTextEntry2] : gained weight with pandemic [FreeTextEntry3] : has cataract, pending surgery [FreeTextEntry5] : palpitations related to anxiety [FreeTextEntry6] : had QUINONES but resolved; occasional cough [FreeTextEntry9] : joint pain from age

## 2021-09-26 NOTE — ASSESSMENT
Patient called and said that she wanted to talk to Dr. Kishan Marcano about her medications. She wanted to discuss the Novolog and Levemir.
[FreeTextEntry1] : 59 yo female with h/o as above including HTN, hyperlipidemia, preDM, subclinical hypothyroidism, hodgkin's lymphoma s/p treatment in remission, osteopenia, abnl paps, pulmonary fibrosis, here for CPE.\par 1.  CV - bp at goal, check bmp on diuretic, lipids done few months ago, reviewed, acceptable on statin; QUINONES resolved with exercise (suspect was deconditioning)\par 2.   Endo - still has preDM based on labs few months ago, tfts nl few months ago on synthroid, dexa utd\par 3.  Gyn - pap and mammo utd\par 4.  GI - colonoscopy utd\par 5.  Heme-onc - will f/up with heme-onc to f/up given hx of lymphoma\par 6.  HCM - hold on other labs as all nl few months ago (nl cbc and cmp); wants to hold off on shingrix, other vaccines utd\par 7.  RTO 6 months bp check

## 2021-09-26 NOTE — HISTORY OF PRESENT ILLNESS
[FreeTextEntry1] : physical [de-identified] : 59 yo female with h/o as below here for CPE.\par Having difficulty breathing going upstairs, going up[place], [place], was getting worse, saw cardiologist and had stress test and was nondiagnostic, then saw pulmonary and PFTs nl.  Then started walking on a regular basis and doesn't feel SOB.  \par Still had abnl pap, saw gyn Dr. Nolan, consulted with gyn onc, advised to start vagifem again for severe vaginal dryness.\par Sees hand surgeon at times for trigger finger.\par Has to schedule appt with oncologist for f/up visit.\par Otherwise feeling well overall.

## 2021-09-26 NOTE — PHYSICAL EXAM
[Well Nourished] : well nourished [No Acute Distress] : no acute distress [Well Developed] : well developed [Well-Appearing] : well-appearing [PERRL] : pupils equal round and reactive to light [EOMI] : extraocular movements intact [Normal TMs] : both tympanic membranes were normal [No Lymphadenopathy] : no lymphadenopathy [Thyroid Normal, No Nodules] : the thyroid was normal and there were no nodules present [Supple] : supple [No Respiratory Distress] : no respiratory distress  [No Accessory Muscle Use] : no accessory muscle use [Clear to Auscultation] : lungs were clear to auscultation bilaterally [Normal Rate] : normal rate  [Regular Rhythm] : with a regular rhythm [Normal S1, S2] : normal S1 and S2 [No Murmur] : no murmur heard [No Carotid Bruits] : no carotid bruits [Pedal Pulses Present] : the pedal pulses are present [No Edema] : there was no peripheral edema [Soft] : abdomen soft [Non Tender] : non-tender [No Masses] : no abdominal mass palpated [Non-distended] : non-distended [No HSM] : no HSM [Normal Bowel Sounds] : normal bowel sounds [Normal Supraclavicular Nodes] : no supraclavicular lymphadenopathy [Normal Posterior Cervical Nodes] : no posterior cervical lymphadenopathy [Normal Anterior Cervical Nodes] : no anterior cervical lymphadenopathy [No Joint Swelling] : no joint swelling [Normal Inguinal Nodes] : no inguinal lymphadenopathy [No Rash] : no rash [Normal Gait] : normal gait [Normal Affect] : the affect was normal

## 2021-10-25 ENCOUNTER — APPOINTMENT (OUTPATIENT)
Dept: INTERNAL MEDICINE | Facility: CLINIC | Age: 61
End: 2021-10-25
Payer: COMMERCIAL

## 2021-10-25 VITALS
HEIGHT: 60 IN | HEART RATE: 78 BPM | OXYGEN SATURATION: 96 % | DIASTOLIC BLOOD PRESSURE: 80 MMHG | WEIGHT: 123 LBS | SYSTOLIC BLOOD PRESSURE: 150 MMHG | BODY MASS INDEX: 24.15 KG/M2 | RESPIRATION RATE: 15 BRPM

## 2021-10-25 DIAGNOSIS — R42 DIZZINESS AND GIDDINESS: ICD-10-CM

## 2021-10-25 PROCEDURE — 99213 OFFICE O/P EST LOW 20 MIN: CPT

## 2021-10-25 NOTE — HISTORY OF PRESENT ILLNESS
[FreeTextEntry8] : 61-year-old female with a history of vertigo in the past comes in with recurrent symptoms over the past week.  Think she got up too quickly and had the room spinning and felt nauseous.  Episodes do not last long but keep occurring throughout the day over the past week.  She has not wanted to take any medication for this nor did she want to take it in the past.  She would like to go for physical therapy as she found that this did help.  She is trying to do exercises at home and feels that her symptoms are more left-sided in nature.

## 2021-10-25 NOTE — ASSESSMENT
[FreeTextEntry1] : 61-year-old female with a history of vertigo in the past now with recurrent symptoms.  Educated that she can purchase Bonine should she wish to take medication but she declines as she would prefer physical therapy.  Rx given.  Discussed doing a modified Epley at home which she has already tried.  She will continue to work on exercises and call if symptoms do not resolve.

## 2021-10-25 NOTE — PHYSICAL EXAM
[No Acute Distress] : no acute distress [Well Nourished] : well nourished [Well Developed] : well developed [Well-Appearing] : well-appearing [Normal Sclera/Conjunctiva] : normal sclera/conjunctiva [Coordination Grossly Intact] : coordination grossly intact [de-identified] : Positive nystagmus on left lateral gaze [de-identified] : Nystagmus as above.  No other focal deficits.

## 2021-11-09 ENCOUNTER — TRANSCRIPTION ENCOUNTER (OUTPATIENT)
Age: 61
End: 2021-11-09

## 2021-11-09 ENCOUNTER — RX RENEWAL (OUTPATIENT)
Age: 61
End: 2021-11-09

## 2021-12-06 ENCOUNTER — APPOINTMENT (OUTPATIENT)
Dept: ULTRASOUND IMAGING | Facility: CLINIC | Age: 61
End: 2021-12-06
Payer: COMMERCIAL

## 2021-12-06 ENCOUNTER — OUTPATIENT (OUTPATIENT)
Dept: OUTPATIENT SERVICES | Facility: HOSPITAL | Age: 61
LOS: 1 days | End: 2021-12-06
Payer: COMMERCIAL

## 2021-12-06 DIAGNOSIS — Z98.890 OTHER SPECIFIED POSTPROCEDURAL STATES: Chronic | ICD-10-CM

## 2021-12-06 DIAGNOSIS — Z00.8 ENCOUNTER FOR OTHER GENERAL EXAMINATION: ICD-10-CM

## 2021-12-06 DIAGNOSIS — Z98.891 HISTORY OF UTERINE SCAR FROM PREVIOUS SURGERY: Chronic | ICD-10-CM

## 2021-12-06 DIAGNOSIS — R10.2 PELVIC AND PERINEAL PAIN: ICD-10-CM

## 2021-12-06 PROCEDURE — 76856 US EXAM PELVIC COMPLETE: CPT | Mod: 26

## 2021-12-06 PROCEDURE — 76830 TRANSVAGINAL US NON-OB: CPT

## 2021-12-06 PROCEDURE — 76856 US EXAM PELVIC COMPLETE: CPT

## 2021-12-06 PROCEDURE — 76830 TRANSVAGINAL US NON-OB: CPT | Mod: 26

## 2021-12-30 ENCOUNTER — OUTPATIENT (OUTPATIENT)
Dept: OUTPATIENT SERVICES | Facility: HOSPITAL | Age: 61
LOS: 1 days | Discharge: HOME | End: 2021-12-30

## 2021-12-30 DIAGNOSIS — Z98.891 HISTORY OF UTERINE SCAR FROM PREVIOUS SURGERY: Chronic | ICD-10-CM

## 2021-12-30 DIAGNOSIS — Z98.890 OTHER SPECIFIED POSTPROCEDURAL STATES: Chronic | ICD-10-CM

## 2021-12-30 DIAGNOSIS — D64.9 ANEMIA, UNSPECIFIED: ICD-10-CM

## 2022-01-01 ENCOUNTER — OUTPATIENT (OUTPATIENT)
Dept: OUTPATIENT SERVICES | Facility: HOSPITAL | Age: 62
LOS: 1 days | Discharge: HOME | End: 2022-01-01

## 2022-01-01 DIAGNOSIS — D64.9 ANEMIA, UNSPECIFIED: ICD-10-CM

## 2022-01-01 DIAGNOSIS — Z98.891 HISTORY OF UTERINE SCAR FROM PREVIOUS SURGERY: Chronic | ICD-10-CM

## 2022-01-01 DIAGNOSIS — Z98.890 OTHER SPECIFIED POSTPROCEDURAL STATES: Chronic | ICD-10-CM

## 2022-01-03 ENCOUNTER — RESULT REVIEW (OUTPATIENT)
Age: 62
End: 2022-01-03

## 2022-01-03 ENCOUNTER — APPOINTMENT (OUTPATIENT)
Dept: HEMATOLOGY ONCOLOGY | Facility: CLINIC | Age: 62
End: 2022-01-03
Payer: COMMERCIAL

## 2022-01-03 ENCOUNTER — RX RENEWAL (OUTPATIENT)
Age: 62
End: 2022-01-03

## 2022-01-03 VITALS
RESPIRATION RATE: 14 BRPM | WEIGHT: 121.01 LBS | OXYGEN SATURATION: 97 % | BODY MASS INDEX: 23.64 KG/M2 | SYSTOLIC BLOOD PRESSURE: 164 MMHG | TEMPERATURE: 98.2 F | DIASTOLIC BLOOD PRESSURE: 82 MMHG | HEART RATE: 90 BPM

## 2022-01-03 LAB
BASOPHILS # BLD AUTO: 0.06 K/UL — SIGNIFICANT CHANGE UP (ref 0–0.2)
BASOPHILS NFR BLD AUTO: 1 % — SIGNIFICANT CHANGE UP (ref 0–2)
EOSINOPHIL # BLD AUTO: 0.15 K/UL — SIGNIFICANT CHANGE UP (ref 0–0.5)
EOSINOPHIL NFR BLD AUTO: 2.6 % — SIGNIFICANT CHANGE UP (ref 0–6)
HCT VFR BLD CALC: 40.8 % — SIGNIFICANT CHANGE UP (ref 34.5–45)
HGB BLD-MCNC: 13.4 G/DL — SIGNIFICANT CHANGE UP (ref 11.5–15.5)
IMM GRANULOCYTES NFR BLD AUTO: 0.3 % — SIGNIFICANT CHANGE UP (ref 0–1.5)
LYMPHOCYTES # BLD AUTO: 1.89 K/UL — SIGNIFICANT CHANGE UP (ref 1–3.3)
LYMPHOCYTES # BLD AUTO: 32.1 % — SIGNIFICANT CHANGE UP (ref 13–44)
MCHC RBC-ENTMCNC: 29.8 PG — SIGNIFICANT CHANGE UP (ref 27–34)
MCHC RBC-ENTMCNC: 32.8 G/DL — SIGNIFICANT CHANGE UP (ref 32–36)
MCV RBC AUTO: 90.7 FL — SIGNIFICANT CHANGE UP (ref 80–100)
MONOCYTES # BLD AUTO: 0.6 K/UL — SIGNIFICANT CHANGE UP (ref 0–0.9)
MONOCYTES NFR BLD AUTO: 10.2 % — SIGNIFICANT CHANGE UP (ref 2–14)
NEUTROPHILS # BLD AUTO: 3.16 K/UL — SIGNIFICANT CHANGE UP (ref 1.8–7.4)
NEUTROPHILS NFR BLD AUTO: 53.8 % — SIGNIFICANT CHANGE UP (ref 43–77)
NRBC # BLD: 0 /100 WBCS — SIGNIFICANT CHANGE UP (ref 0–0)
PLATELET # BLD AUTO: 353 K/UL — SIGNIFICANT CHANGE UP (ref 150–400)
RBC # BLD: 4.5 M/UL — SIGNIFICANT CHANGE UP (ref 3.8–5.2)
RBC # FLD: 12.5 % — SIGNIFICANT CHANGE UP (ref 10.3–14.5)
WBC # BLD: 5.88 K/UL — SIGNIFICANT CHANGE UP (ref 3.8–10.5)
WBC # FLD AUTO: 5.88 K/UL — SIGNIFICANT CHANGE UP (ref 3.8–10.5)

## 2022-01-03 PROCEDURE — 99213 OFFICE O/P EST LOW 20 MIN: CPT

## 2022-01-03 NOTE — ASSESSMENT
[FreeTextEntry1] : 56 yo F with hx Hodgkin's dx in 2001 s/p chemo/RT in remission, with leukopenia -improved. Last seen here in 2017. \par \par -no leukopenia today. CBC reviewed w/ pt and copy provided\par \par -clinical exam stable, no evidence of recurrent lymphoma\par \par -follow up in 1 year

## 2022-01-03 NOTE — REVIEW OF SYSTEMS
[Negative] : Allergic/Immunologic [Fever] : no fever [Chills] : no chills [Night Sweats] : no night sweats [Fatigue] : no fatigue [Recent Change In Weight] : ~T no recent weight change [Chest Pain] : no chest pain [Palpitations] : no palpitations [Leg Claudication] : no intermittent leg claudication [Cough] : no cough [Abdominal Pain] : no abdominal pain [Constipation] : no constipation [Diarrhea] : no diarrhea [Joint Stiffness] : no joint stiffness [FreeTextEntry9] : no stiffness

## 2022-01-03 NOTE — HISTORY OF PRESENT ILLNESS
[de-identified] : Ms. House was referred back for leukopenia -no infections.  [de-identified] : The patient is here for leukopenia follow up. She was last seen here in 2017 by Dr. Doan. \par She also has history of Hodgkin's disease in 2001 -in remission. \par She had an abnormal PAP smear -biopsy showed atrophic vaginitis. \par

## 2022-01-04 LAB
ALBUMIN SERPL ELPH-MCNC: 4.7 G/DL
ALP BLD-CCNC: 102 U/L
ALT SERPL-CCNC: 16 U/L
ANION GAP SERPL CALC-SCNC: 12 MMOL/L
AST SERPL-CCNC: 23 U/L
BILIRUB SERPL-MCNC: 0.3 MG/DL
BUN SERPL-MCNC: 16 MG/DL
CALCIUM SERPL-MCNC: 9.8 MG/DL
CHLORIDE SERPL-SCNC: 100 MMOL/L
CO2 SERPL-SCNC: 28 MMOL/L
CREAT SERPL-MCNC: 0.64 MG/DL
GLUCOSE SERPL-MCNC: 113 MG/DL
LDH SERPL-CCNC: 217 U/L
POTASSIUM SERPL-SCNC: 4.1 MMOL/L
PROT SERPL-MCNC: 7.4 G/DL
SODIUM SERPL-SCNC: 140 MMOL/L

## 2022-01-11 ENCOUNTER — APPOINTMENT (OUTPATIENT)
Dept: CARDIOLOGY | Facility: CLINIC | Age: 62
End: 2022-01-11
Payer: COMMERCIAL

## 2022-01-11 ENCOUNTER — NON-APPOINTMENT (OUTPATIENT)
Age: 62
End: 2022-01-11

## 2022-01-11 VITALS
HEART RATE: 82 BPM | WEIGHT: 122 LBS | SYSTOLIC BLOOD PRESSURE: 147 MMHG | OXYGEN SATURATION: 97 % | BODY MASS INDEX: 23.83 KG/M2 | DIASTOLIC BLOOD PRESSURE: 82 MMHG

## 2022-01-11 VITALS — SYSTOLIC BLOOD PRESSURE: 138 MMHG | DIASTOLIC BLOOD PRESSURE: 80 MMHG

## 2022-01-11 PROCEDURE — 99214 OFFICE O/P EST MOD 30 MIN: CPT

## 2022-01-11 PROCEDURE — 93000 ELECTROCARDIOGRAM COMPLETE: CPT

## 2022-01-11 NOTE — HISTORY OF PRESENT ILLNESS
[FreeTextEntry1] : Vannesa was in the [facility] with her employer (she is ) who developed COVID there. She had palpitations and went to clinic there BP was high. When she returned she noticed still had elevated BP. Went to oncologist and elevated. In the morning it is ok but after lunch seems to be climbing. Ranging 140-170 with heart pumping hard at night.

## 2022-01-11 NOTE — DISCUSSION/SUMMARY
[FreeTextEntry1] : The patient is a 61-year-old female Hodgkins s/p chemo/rad years ago, hypothyroid, htn, hyperlipidemia, ZAKI, pulmonary fibrosis  s/p COVID with newly elevated evening blood pressures and heart beats\par #1 CV- normal ECG, echo and exercise stress test\par #2 Htn- continue HCTZ and metoprolol and now add diltiazem 30mg 1-2 times a day and reevaluate, 24 hour blood pressure monitor\par #3 Lipids- continue atorvastatin\par #4 Hypothyroid- continue levothyroxine\par #5 Pulm- ZAKI with nose strip, mild pulm fibrosis, f/u Dr. Reid\par #6 Onc- remote Hodgkins treated with chemo and radiation, increases risk for pulm and CV disease

## 2022-01-31 ENCOUNTER — NON-APPOINTMENT (OUTPATIENT)
Age: 62
End: 2022-01-31

## 2022-01-31 ENCOUNTER — APPOINTMENT (OUTPATIENT)
Dept: OPHTHALMOLOGY | Facility: CLINIC | Age: 62
End: 2022-01-31
Payer: COMMERCIAL

## 2022-01-31 PROCEDURE — 92136 OPHTHALMIC BIOMETRY: CPT

## 2022-01-31 PROCEDURE — 92025 CPTRIZED CORNEAL TOPOGRAPHY: CPT

## 2022-01-31 PROCEDURE — 92014 COMPRE OPH EXAM EST PT 1/>: CPT

## 2022-02-08 ENCOUNTER — APPOINTMENT (OUTPATIENT)
Dept: INTERNAL MEDICINE | Facility: CLINIC | Age: 62
End: 2022-02-08
Payer: COMMERCIAL

## 2022-02-08 VITALS
WEIGHT: 118 LBS | DIASTOLIC BLOOD PRESSURE: 70 MMHG | HEIGHT: 60 IN | OXYGEN SATURATION: 98 % | SYSTOLIC BLOOD PRESSURE: 126 MMHG | HEART RATE: 79 BPM | BODY MASS INDEX: 23.16 KG/M2

## 2022-02-08 DIAGNOSIS — H25.813 COMBINED FORMS OF AGE-RELATED CATARACT, BILATERAL: ICD-10-CM

## 2022-02-08 DIAGNOSIS — J47.9 BRONCHIECTASIS, UNCOMPLICATED: ICD-10-CM

## 2022-02-08 PROCEDURE — 99214 OFFICE O/P EST MOD 30 MIN: CPT

## 2022-02-09 LAB
ANION GAP SERPL CALC-SCNC: 16 MMOL/L
BASOPHILS # BLD AUTO: 0.02 K/UL
BASOPHILS NFR BLD AUTO: 0.4 %
BUN SERPL-MCNC: 16 MG/DL
CALCIUM SERPL-MCNC: 10.4 MG/DL
CHLORIDE SERPL-SCNC: 99 MMOL/L
CO2 SERPL-SCNC: 26 MMOL/L
CREAT SERPL-MCNC: 0.56 MG/DL
EOSINOPHIL # BLD AUTO: 0.21 K/UL
EOSINOPHIL NFR BLD AUTO: 4.1 %
GLUCOSE SERPL-MCNC: 111 MG/DL
HCT VFR BLD CALC: 43.9 %
HGB BLD-MCNC: 13.8 G/DL
IMM GRANULOCYTES NFR BLD AUTO: 0.2 %
LYMPHOCYTES # BLD AUTO: 1.75 K/UL
LYMPHOCYTES NFR BLD AUTO: 34.3 %
MAN DIFF?: NORMAL
MCHC RBC-ENTMCNC: 29.9 PG
MCHC RBC-ENTMCNC: 31.4 GM/DL
MCV RBC AUTO: 95.2 FL
MONOCYTES # BLD AUTO: 0.5 K/UL
MONOCYTES NFR BLD AUTO: 9.8 %
NEUTROPHILS # BLD AUTO: 2.61 K/UL
NEUTROPHILS NFR BLD AUTO: 51.2 %
PLATELET # BLD AUTO: 311 K/UL
POTASSIUM SERPL-SCNC: 4.5 MMOL/L
RBC # BLD: 4.61 M/UL
RBC # FLD: 13.3 %
SODIUM SERPL-SCNC: 141 MMOL/L
WBC # FLD AUTO: 5.1 K/UL

## 2022-02-15 ENCOUNTER — NON-APPOINTMENT (OUTPATIENT)
Age: 62
End: 2022-02-15

## 2022-02-15 ENCOUNTER — APPOINTMENT (OUTPATIENT)
Dept: CARDIOLOGY | Facility: CLINIC | Age: 62
End: 2022-02-15
Payer: COMMERCIAL

## 2022-02-15 VITALS
SYSTOLIC BLOOD PRESSURE: 119 MMHG | DIASTOLIC BLOOD PRESSURE: 80 MMHG | HEIGHT: 60 IN | BODY MASS INDEX: 23.56 KG/M2 | OXYGEN SATURATION: 99 % | HEART RATE: 82 BPM | WEIGHT: 120 LBS

## 2022-02-15 PROCEDURE — 93000 ELECTROCARDIOGRAM COMPLETE: CPT

## 2022-02-15 PROCEDURE — 99214 OFFICE O/P EST MOD 30 MIN: CPT

## 2022-02-15 NOTE — HISTORY OF PRESENT ILLNESS
[FreeTextEntry1] : Vannesa feels a lot better with one diltiazem today. She sometimes feels tightness in chest and hard to take a breath. Happens in morning to midday lasting less than a minute. Having laser eye surgery for cataract on Friday.

## 2022-02-15 NOTE — DISCUSSION/SUMMARY
[___ Month(s)] : in [unfilled] month(s) [FreeTextEntry1] : The patient is a 61-year-old female Hodgkins s/p chemo/rad years ago, hypothyroid, htn, hyperlipidemia, ZAKI, pulmonary fibrosis  s/p COVID with atypical chest pain\par #1 CV- normal ECG, echo and exercise stress test neg 7/2021\par #2 Htn- continue HCTZ and metoprolol and diltiazem 30mg 1-2 times a day and reevaluate\par #3 Lipids- continue atorvastatin\par #4 Hypothyroid- continue levothyroxine\par #5 Pulm- ZAKI with nose strip, mild pulm fibrosis, f/u Dr. Reid\par #6 Onc- remote Hodgkins treated with chemo and radiation, increases risk for pulm and CV disease\par #7 GI- most likely cause of recent symptoms, restart pepcid\par #8 General- Laser cataract surgery this week.

## 2022-02-16 NOTE — ADDENDUM
[FreeTextEntry1] : 2/16/21 @ 8pm . Pt informed us that  her COVID test yesterday before the cataract surgery came back positive.  She has history of positive test to COVID 19 in December 2021 and fully recovered after home quarantine. Tested positive for COVID home test on 12/18/21 and 12/20/21. 12/21/21: COVID PCR positive. \par The positive COVID test of 2/15/22 was residual result from the initial COVID infection in December which is within 90 days. She is symptom free and optimized for cataract surgery on 2/18/21.

## 2022-02-16 NOTE — ASSESSMENT
[High Risk Surgery - Intraperitoneal, Intrathoracic or Supringuinal Vascular Procedures] : High Risk Surgery - Intraperitoneal, Intrathoracic or Supringuinal Vascular Procedures - No (0) [Ischemic Heart Disease] : Ischemic Heart Disease - No (0) [Congestive Heart Failure] : Congestive Heart Failure - No (0) [Prior Cerebrovascular Accident or TIA] : Prior Cerebrovascular Accident or TIA - No (0) [Creatinine >= 2mg/dL (1 Point)] : Creatinine >= 2mg/dL - No (0) [Insulin-dependent Diabetic (1 Point)] : Insulin-dependent Diabetic - No (0) [0] : 0 , RCRI Class: I, Risk of Post-Op Cardiac Complications: 3.9%, 95% CI for Risk Estimate: 2.8% - 5.4% [Patient Optimized for Surgery] : Patient optimized for surgery [Continue medications as is] : Continue current medications [As per surgery] : as per surgery [No Further Testing Recommended] : no further testing recommended [FreeTextEntry4] : Lab of 2/8/22: CBC: results reviewed, within normal  range , Chemistries: results reviewed, BMP within normal range \par ECG of 1/11/22 by cardiology:sinus rhythm, left atrial enlargement, 80 bpm.\par Pt is low risk candidate for low risk procedure with no further w/up required prior to planned surgery and no contraindication to proceeding with planned surgery.  [FreeTextEntry7] : Do not take Aspirin, NSAID( Motrin, Ibuprofen etc.), Herb, supplement 7 days prior to surgery. Continue take heart medications including Diltiazem 30mg po bid,Metoprolol 50mg po bid with small sips of water in the morning of the operation day. Hold HCTZ 25mg po on the operation day.

## 2022-02-16 NOTE — PHYSICAL EXAM
[No Acute Distress] : no acute distress [Well Nourished] : well nourished [Well Developed] : well developed [Well-Appearing] : well-appearing [Normal Sclera/Conjunctiva] : normal sclera/conjunctiva [PERRL] : pupils equal round and reactive to light [EOMI] : extraocular movements intact [Normal Outer Ear/Nose] : the outer ears and nose were normal in appearance [Normal Oropharynx] : the oropharynx was normal [No JVD] : no jugular venous distention [No Lymphadenopathy] : no lymphadenopathy [Supple] : supple [Thyroid Normal, No Nodules] : the thyroid was normal and there were no nodules present [No Respiratory Distress] : no respiratory distress  [No Accessory Muscle Use] : no accessory muscle use [Clear to Auscultation] : lungs were clear to auscultation bilaterally [Normal Rate] : normal rate  [Regular Rhythm] : with a regular rhythm [Normal S1, S2] : normal S1 and S2 [No Carotid Bruits] : no carotid bruits [Pedal Pulses Present] : the pedal pulses are present [No Edema] : there was no peripheral edema [Soft] : abdomen soft [Non Tender] : non-tender [Non-distended] : non-distended [No Masses] : no abdominal mass palpated [No HSM] : no HSM [Normal Bowel Sounds] : normal bowel sounds [Normal Posterior Cervical Nodes] : no posterior cervical lymphadenopathy [Normal Anterior Cervical Nodes] : no anterior cervical lymphadenopathy [No CVA Tenderness] : no CVA  tenderness [No Spinal Tenderness] : no spinal tenderness [No Joint Swelling] : no joint swelling [Grossly Normal Strength/Tone] : grossly normal strength/tone [No Rash] : no rash [Coordination Grossly Intact] : coordination grossly intact [No Focal Deficits] : no focal deficits [Normal Gait] : normal gait [Normal Affect] : the affect was normal [Normal Insight/Judgement] : insight and judgment were intact [No Abdominal Bruit] : a ~M bruit was not heard ~T in the abdomen [de-identified] : diastolic murmur(2/6) left lower sternal border

## 2022-02-16 NOTE — HISTORY OF PRESENT ILLNESS
[Sleep Apnea] : sleep apnea [(Patient denies any chest pain, claudication, dyspnea on exertion, orthopnea, palpitations or syncope)] : Patient denies any chest pain, claudication, dyspnea on exertion, orthopnea, palpitations or syncope [Moderate (4-6 METs)] : Moderate (4-6 METs) [Aortic Stenosis] : no aortic stenosis [Atrial Fibrillation] : no atrial fibrillation [Coronary Artery Disease] : no coronary artery disease [Recent Myocardial Infarction] : no recent myocardial infarction [Implantable Device/Pacemaker] : no implantable device/pacemaker [Asthma] : no asthma [COPD] : no COPD [Smoker] : not a smoker [Family Member] : no family member with adverse anesthesia reaction/sudden death [Self] : no previous adverse anesthesia reaction [Chronic Anticoagulation] : no chronic anticoagulation [Chronic Kidney Disease] : no chronic kidney disease [Diabetes] : no diabetes [FreeTextEntry1] : cataract laser surgery OS [FreeTextEntry2] : 2/18/22 [FreeTextEntry3] : Dr. Hood [facility] fax to  attention to Shruthi, tel  [FreeTextEntry4] : JOSÉ HEATON  is a 61 year old female  with history of  HTN, hyperlipidemia, preDM, subclinical hypothyroidism, hodgkin's lymphoma s/p treatment in remission, osteopenia, abnl paps, pulmonary fibrosis presented today for pre-op medical clearance. \par \par She was infected with COVID 19 in December 2021 and fully recovered after home quarantine. \par Tested positive for COVID home test on 12/18/21 and 12/20/21. 12/21/21: PCR positive. 1/24/22 : pcr negative. \par Pt was seen by [facility] on 1/3/22 for Hodgkin's disease in 2001 s/p chemo/RT in remission. Pt was stable with normal CBC, BMP. [facility] Seen by Cardiology on 1/11/22 for BP, palpitation and medication changed. To f/up with Dr. Mcclelland next week. No fever, chills, CP, SOB, leg swelling, n/v/c/d, abdominal pain. [FreeTextEntry5] : sleep test: offered with dental device but barely using it. Instead using a  nasal strip with less loud snoring.  [FreeTextEntry7] : 7/27/21\par ECHO: mild to moderate Aortic valve regurgitation. Normal LV systolic function, mild tricuspid valve regurgitation.\par Stress test: no chest pain or ischemic change but could not complete the test due to SOB and cough,  5 METs poor result given age and gender. \par  [FreeTextEntry8] : can go up flights of stairs slowly.

## 2022-02-18 ENCOUNTER — NON-APPOINTMENT (OUTPATIENT)
Age: 62
End: 2022-02-18

## 2022-02-18 ENCOUNTER — APPOINTMENT (OUTPATIENT)
Dept: OPHTHALMOLOGY | Facility: CLINIC | Age: 62
End: 2022-02-18

## 2022-03-08 ENCOUNTER — RX RENEWAL (OUTPATIENT)
Age: 62
End: 2022-03-08

## 2022-04-11 PROBLEM — Z11.59 SCREENING FOR VIRAL DISEASE: Status: RESOLVED | Noted: 2020-07-27 | Resolved: 2021-09-26

## 2022-04-14 ENCOUNTER — NON-APPOINTMENT (OUTPATIENT)
Age: 62
End: 2022-04-14

## 2022-04-14 ENCOUNTER — APPOINTMENT (OUTPATIENT)
Dept: INTERNAL MEDICINE | Facility: CLINIC | Age: 62
End: 2022-04-14
Payer: COMMERCIAL

## 2022-04-14 PROCEDURE — 99443: CPT

## 2022-04-14 NOTE — HISTORY OF PRESENT ILLNESS
[FreeTextEntry1] : cataract surgery left eye [FreeTextEntry2] : 4/15/22 [FreeTextEntry3] : Dr. Hood, [facility] [FreeTextEntry4] : 60 yo female with h/o as below including HTN, hyperlipidemia, preDM, ZAKI not on cpap, lymphoma in remission, subclinical hypothyroidism, pulmonary fibrosis, here for preoperative evaluation prior to cataract surgery tomorrow.\par Feeling well overall, no complaints.  After had visit with cardiologist 2/15/22, has been on diltiazem twice/day and since then bp has been under control at home, bp 114/75, 125/85.  No chest pain (had chest pain previously that resolved with Pepcid, thought to be GERD), has rare palpitations (chronic and improved), no SOB, no dizziness, no syncopal episodes.  Can walk on level ground for an hour without having to stop for chest pain or shortness of breath, has some shortness of breath on walking up hill but chronic for years.  Stress test 7/2021 was nondiagnostic due to low workload but no ischemic changes found, echo 7/2021 showed mild to moderate aortic regurgitation with no segmental wall abnl.\par No bleeding or clotting disorders, no complications with anesthesia in the past.

## 2022-04-14 NOTE — RESULTS/DATA
[] : results reviewed [de-identified] : nl 2/22 [de-identified] : nl 2/22 [de-identified] : 2/15/22 NSR with no abnl

## 2022-04-14 NOTE — ASSESSMENT
[High Risk Surgery - Intraperitoneal, Intrathoracic or Supringuinal Vascular Procedures] : High Risk Surgery - Intraperitoneal, Intrathoracic or Supringuinal Vascular Procedures - No (0) [Ischemic Heart Disease] : Ischemic Heart Disease - No (0) [Congestive Heart Failure] : Congestive Heart Failure - No (0) [Prior Cerebrovascular Accident or TIA] : Prior Cerebrovascular Accident or TIA - No (0) [Creatinine >= 2mg/dL (1 Point)] : Creatinine >= 2mg/dL - No (0) [Insulin-dependent Diabetic (1 Point)] : Insulin-dependent Diabetic - No (0) [0] : 0 , RCRI Class: I, Risk of Post-Op Cardiac Complications: 3.9%, 95% CI for Risk Estimate: 2.8% - 5.4% [Patient Optimized for Surgery] : Patient optimized for surgery [FreeTextEntry4] : 62 yo female with h/o as above including HTN, hyperlipidemia, preDM, ZAKI not on cpap, lymphoma in remission, subclinical hypothyroidism, pulmonary fibrosis, here for preoperative evaluation prior to cataract surgery tomorrow.  No active cardiac conditions, good exercise tolerance without symptoms, with nl ECG and labs 2/2022 and acceptable stress test and echo 07/2021.  Pt is low risk candidate for low risk procedure with no further w/up required prior to planned surgery and no contraindication to proceeding with planned surgery.

## 2022-04-14 NOTE — REVIEW OF SYSTEMS
[Fever] : no fever [Chills] : no chills [Chest Pain] : no chest pain [Shortness Of Breath] : no shortness of breath [Cough] : no cough [Abdominal Pain] : no abdominal pain [Nausea] : no nausea [Diarrhea] : diarrhea [Vomiting] : no vomiting [Dysuria] : no dysuria [Frequency] : no frequency [Dizziness] : no dizziness [Fainting] : no fainting [Easy Bleeding] : no easy bleeding [Easy Bruising] : no easy bruising [FreeTextEntry3] : see hpi

## 2022-04-15 ENCOUNTER — APPOINTMENT (OUTPATIENT)
Dept: OPHTHALMOLOGY | Facility: AMBULATORY MEDICAL SERVICES | Age: 62
End: 2022-04-15
Payer: COMMERCIAL

## 2022-04-15 ENCOUNTER — NON-APPOINTMENT (OUTPATIENT)
Age: 62
End: 2022-04-15

## 2022-04-15 PROCEDURE — 66984 XCAPSL CTRC RMVL W/O ECP: CPT | Mod: LT

## 2022-04-15 PROCEDURE — 6698F: CPT

## 2022-04-16 ENCOUNTER — NON-APPOINTMENT (OUTPATIENT)
Age: 62
End: 2022-04-16

## 2022-04-16 ENCOUNTER — APPOINTMENT (OUTPATIENT)
Dept: OPHTHALMOLOGY | Facility: CLINIC | Age: 62
End: 2022-04-16
Payer: COMMERCIAL

## 2022-04-16 PROCEDURE — 99024 POSTOP FOLLOW-UP VISIT: CPT

## 2022-04-19 ENCOUNTER — APPOINTMENT (OUTPATIENT)
Dept: CARDIOLOGY | Facility: CLINIC | Age: 62
End: 2022-04-19

## 2022-04-22 ENCOUNTER — NON-APPOINTMENT (OUTPATIENT)
Age: 62
End: 2022-04-22

## 2022-04-22 ENCOUNTER — APPOINTMENT (OUTPATIENT)
Dept: OPHTHALMOLOGY | Facility: CLINIC | Age: 62
End: 2022-04-22
Payer: COMMERCIAL

## 2022-04-22 PROCEDURE — 99024 POSTOP FOLLOW-UP VISIT: CPT

## 2022-05-09 ENCOUNTER — NON-APPOINTMENT (OUTPATIENT)
Age: 62
End: 2022-05-09

## 2022-05-09 ENCOUNTER — APPOINTMENT (OUTPATIENT)
Dept: OPHTHALMOLOGY | Facility: CLINIC | Age: 62
End: 2022-05-09
Payer: COMMERCIAL

## 2022-05-09 PROCEDURE — 99024 POSTOP FOLLOW-UP VISIT: CPT

## 2022-05-13 ENCOUNTER — APPOINTMENT (OUTPATIENT)
Dept: OPHTHALMOLOGY | Facility: CLINIC | Age: 62
End: 2022-05-13

## 2022-05-18 ENCOUNTER — NON-APPOINTMENT (OUTPATIENT)
Age: 62
End: 2022-05-18

## 2022-05-24 ENCOUNTER — APPOINTMENT (OUTPATIENT)
Dept: CARDIOLOGY | Facility: CLINIC | Age: 62
End: 2022-05-24
Payer: COMMERCIAL

## 2022-05-24 ENCOUNTER — APPOINTMENT (OUTPATIENT)
Dept: INTERNAL MEDICINE | Facility: CLINIC | Age: 62
End: 2022-05-24
Payer: COMMERCIAL

## 2022-05-24 ENCOUNTER — NON-APPOINTMENT (OUTPATIENT)
Age: 62
End: 2022-05-24

## 2022-05-24 ENCOUNTER — RX RENEWAL (OUTPATIENT)
Age: 62
End: 2022-05-24

## 2022-05-24 VITALS
SYSTOLIC BLOOD PRESSURE: 133 MMHG | HEIGHT: 60 IN | BODY MASS INDEX: 23.95 KG/M2 | HEART RATE: 72 BPM | WEIGHT: 122 LBS | DIASTOLIC BLOOD PRESSURE: 75 MMHG | OXYGEN SATURATION: 98 %

## 2022-05-24 VITALS
DIASTOLIC BLOOD PRESSURE: 74 MMHG | OXYGEN SATURATION: 98 % | BODY MASS INDEX: 23.95 KG/M2 | HEIGHT: 60 IN | WEIGHT: 122 LBS | SYSTOLIC BLOOD PRESSURE: 163 MMHG | HEART RATE: 71 BPM

## 2022-05-24 DIAGNOSIS — I35.1 NONRHEUMATIC AORTIC (VALVE) INSUFFICIENCY: ICD-10-CM

## 2022-05-24 PROCEDURE — 99204 OFFICE O/P NEW MOD 45 MIN: CPT | Mod: 25

## 2022-05-24 PROCEDURE — 93000 ELECTROCARDIOGRAM COMPLETE: CPT

## 2022-05-24 PROCEDURE — 99213 OFFICE O/P EST LOW 20 MIN: CPT

## 2022-05-24 RX ORDER — DIAZEPAM 5 MG/1
5 TABLET ORAL
Qty: 30 | Refills: 0 | Status: DISCONTINUED | COMMUNITY
Start: 2022-01-09

## 2022-05-24 RX ORDER — PREDNISOLONE ACETATE 10 MG/ML
1 SUSPENSION/ DROPS OPHTHALMIC
Qty: 5 | Refills: 0 | Status: DISCONTINUED | COMMUNITY
Start: 2022-04-08

## 2022-05-25 NOTE — ASSESSMENT
[FreeTextEntry1] : Pt to fu with GI, switch to omeprazole \par avoid spicy foods\par avoid eating right before bed \par avoid laying down after eating \par \par Ordered appropriate labs based on diagnosis and prevention .\par

## 2022-05-25 NOTE — HISTORY OF PRESENT ILLNESS
[FreeTextEntry8] : 61 year old female with complaints of  gi for over a year \par Now on pepcid, able to sleep, better\par She also feels she has a spasm in her abdomen area. \par She has not had an endoscopy. \par \par She also had "chest pain" so now  on a holter monitor, sees cardio routinely \par \par traveling in 2 weeks to the [facility] \par \par \par

## 2022-05-26 ENCOUNTER — NON-APPOINTMENT (OUTPATIENT)
Age: 62
End: 2022-05-26

## 2022-05-26 LAB
25(OH)D3 SERPL-MCNC: 51.4 NG/ML
ALBUMIN SERPL ELPH-MCNC: 4.6 G/DL
ALP BLD-CCNC: 91 U/L
ALT SERPL-CCNC: 17 U/L
ANION GAP SERPL CALC-SCNC: 14 MMOL/L
AST SERPL-CCNC: 23 U/L
BASOPHILS # BLD AUTO: 0.04 K/UL
BASOPHILS NFR BLD AUTO: 0.8 %
BILIRUB SERPL-MCNC: 0.3 MG/DL
BUN SERPL-MCNC: 20 MG/DL
CALCIUM SERPL-MCNC: 10.3 MG/DL
CHLORIDE SERPL-SCNC: 101 MMOL/L
CHOLEST SERPL-MCNC: 196 MG/DL
CO2 SERPL-SCNC: 28 MMOL/L
CREAT SERPL-MCNC: 0.82 MG/DL
EGFR: 81 ML/MIN/1.73M2
EOSINOPHIL # BLD AUTO: 0.23 K/UL
EOSINOPHIL NFR BLD AUTO: 4.8 %
ESTIMATED AVERAGE GLUCOSE: 134 MG/DL
GLUCOSE SERPL-MCNC: 114 MG/DL
HBA1C MFR BLD HPLC: 6.3 %
HCT VFR BLD CALC: 41.8 %
HDLC SERPL-MCNC: 80 MG/DL
HGB BLD-MCNC: 13.4 G/DL
IMM GRANULOCYTES NFR BLD AUTO: 0 %
LDLC SERPL CALC-MCNC: 87 MG/DL
LYMPHOCYTES # BLD AUTO: 1.52 K/UL
LYMPHOCYTES NFR BLD AUTO: 31.7 %
MAN DIFF?: NORMAL
MCHC RBC-ENTMCNC: 29.3 PG
MCHC RBC-ENTMCNC: 32.1 GM/DL
MCV RBC AUTO: 91.5 FL
MONOCYTES # BLD AUTO: 0.47 K/UL
MONOCYTES NFR BLD AUTO: 9.8 %
NEUTROPHILS # BLD AUTO: 2.53 K/UL
NEUTROPHILS NFR BLD AUTO: 52.9 %
NONHDLC SERPL-MCNC: 116 MG/DL
PLATELET # BLD AUTO: 266 K/UL
POTASSIUM SERPL-SCNC: 4.4 MMOL/L
PROT SERPL-MCNC: 7 G/DL
RBC # BLD: 4.57 M/UL
RBC # FLD: 12.8 %
SODIUM SERPL-SCNC: 143 MMOL/L
T4 SERPL-MCNC: 8.1 UG/DL
TRIGL SERPL-MCNC: 144 MG/DL
TSH SERPL-ACNC: 1.86 UIU/ML
URATE SERPL-MCNC: 8.2 MG/DL
WBC # FLD AUTO: 4.79 K/UL

## 2022-05-31 ENCOUNTER — RX RENEWAL (OUTPATIENT)
Age: 62
End: 2022-05-31

## 2022-06-02 ENCOUNTER — RX RENEWAL (OUTPATIENT)
Age: 62
End: 2022-06-02

## 2022-06-06 ENCOUNTER — NON-APPOINTMENT (OUTPATIENT)
Age: 62
End: 2022-06-06

## 2022-06-06 ENCOUNTER — APPOINTMENT (OUTPATIENT)
Dept: OPHTHALMOLOGY | Facility: CLINIC | Age: 62
End: 2022-06-06
Payer: COMMERCIAL

## 2022-06-06 ENCOUNTER — APPOINTMENT (OUTPATIENT)
Dept: OPHTHALMOLOGY | Facility: CLINIC | Age: 62
End: 2022-06-06

## 2022-06-06 PROCEDURE — 99024 POSTOP FOLLOW-UP VISIT: CPT

## 2022-07-11 ENCOUNTER — NON-APPOINTMENT (OUTPATIENT)
Age: 62
End: 2022-07-11

## 2022-07-12 ENCOUNTER — APPOINTMENT (OUTPATIENT)
Dept: CARDIOLOGY | Facility: CLINIC | Age: 62
End: 2022-07-12

## 2022-07-12 ENCOUNTER — APPOINTMENT (OUTPATIENT)
Dept: GASTROENTEROLOGY | Facility: CLINIC | Age: 62
End: 2022-07-12

## 2022-07-12 ENCOUNTER — NON-APPOINTMENT (OUTPATIENT)
Age: 62
End: 2022-07-12

## 2022-07-12 VITALS
SYSTOLIC BLOOD PRESSURE: 147 MMHG | WEIGHT: 122 LBS | OXYGEN SATURATION: 98 % | TEMPERATURE: 98.3 F | BODY MASS INDEX: 23.95 KG/M2 | HEIGHT: 60 IN | HEART RATE: 73 BPM | DIASTOLIC BLOOD PRESSURE: 72 MMHG

## 2022-07-12 PROCEDURE — 99204 OFFICE O/P NEW MOD 45 MIN: CPT

## 2022-07-12 NOTE — ASSESSMENT
[FreeTextEntry1] : Acid reflux. Dietary and lifestyle modification discussed with the patient.  Patient was advised to remain on the daily Omeprazole and add Pepcid as needed.  EGD and biopsy to be scheduled.  She is also referred for an abdominal sonogram to exclude the presence of gallstones.\par Cardiac APCs.  Cardiology clearance requested for the EGD.\par \par  .

## 2022-07-12 NOTE — PHYSICAL EXAM
[General Appearance - Alert] : alert [General Appearance - In No Acute Distress] : in no acute distress [Sclera] : the sclera and conjunctiva were normal [PERRL With Normal Accommodation] : pupils were equal in size, round, and reactive to light [Extraocular Movements] : extraocular movements were intact [Outer Ear] : the ears and nose were normal in appearance [Neck Appearance] : the appearance of the neck was normal [Neck Cervical Mass (___cm)] : no neck mass was observed [Jugular Venous Distention Increased] : there was no jugular-venous distention [Thyroid Diffuse Enlargement] : the thyroid was not enlarged [Thyroid Nodule] : there were no palpable thyroid nodules [Auscultation Breath Sounds / Voice Sounds] : lungs were clear to auscultation bilaterally [Heart Rate And Rhythm] : heart rate was normal and rhythm regular [Heart Sounds] : normal S1 and S2 [Heart Sounds Gallop] : no gallops [Murmurs] : no murmurs [Heart Sounds Pericardial Friction Rub] : no pericardial rub [Bowel Sounds] : normal bowel sounds [Abdomen Soft] : soft [Abdomen Tenderness] : non-tender [] : no hepato-splenomegaly [Abdomen Mass (___ Cm)] : no abdominal mass palpated [Cervical Lymph Nodes Enlarged Posterior Bilaterally] : posterior cervical [Cervical Lymph Nodes Enlarged Anterior Bilaterally] : anterior cervical [Supraclavicular Lymph Nodes Enlarged Bilaterally] : supraclavicular [No CVA Tenderness] : no ~M costovertebral angle tenderness [No Spinal Tenderness] : no spinal tenderness [Abnormal Walk] : normal gait [Nail Clubbing] : no clubbing  or cyanosis of the fingernails [Musculoskeletal - Swelling] : no joint swelling seen [Motor Tone] : muscle strength and tone were normal [Sensation] : the sensory exam was normal to light touch and pinprick [Deep Tendon Reflexes (DTR)] : deep tendon reflexes were 2+ and symmetric [No Focal Deficits] : no focal deficits [Oriented To Time, Place, And Person] : oriented to person, place, and time [Impaired Insight] : insight and judgment were intact [Affect] : the affect was normal

## 2022-07-12 NOTE — HISTORY OF PRESENT ILLNESS
[Heartburn] : heartburn worsened [Nausea] : denies nausea [Vomiting] : denies vomiting [Diarrhea] : denies diarrhea [Constipation] : denies constipation [Yellow Skin Or Eyes (Jaundice)] : denies jaundice [Abdominal Swelling] : denies abdominal swelling [Rectal Pain] : denies rectal pain [Wt Gain ___ Lbs] : recent [unfilled] ~Upound(s) weight gain [Abdominal Pain] : abdominal pain [GERD] : gastroesophageal reflux disease [Malignancy] : malignancy [Wt Loss ___ Lbs] : no recent weight loss [Hiatus Hernia] : no hiatus hernia [Peptic Ulcer Disease] : no peptic ulcer disease [Pancreatitis] : no pancreatitis [Cholelithiasis] : no cholelithiasis [Kidney Stone] : no kidney stone [Inflammatory Bowel Disease] : no inflammatory bowel disease [Irritable Bowel Syndrome] : no irritable bowel syndrome [Diverticulitis] : no diverticulitis [Alcohol Abuse] : no alcohol abuse [Abdominal Surgery] : no abdominal surgery [Appendectomy] : no appendectomy [Cholecystectomy] : no cholecystectomy [de-identified] : 61-year-old woman complains of proximately a year of intermittent acid reflux.  This is associated with epigastric pain and occasional regurgitation.  She underwent an extensive cardiac evaluation that was unremarkable.  She does complain of APCs and has some pulmonary fibrosis due to previous radiation she received years ago for treatment of lymphoma.  She was placed on daily omeprazole 20 mg with relief however she still gets episodes of breakthrough for which she takes Tums.  She stopped drinking coffee but previously had drank up to 3 cups a day.  She denies rectal bleeding, melena or hematemesis.  Colonoscopy in 2014 was unremarkable.

## 2022-07-20 NOTE — HISTORY OF PRESENT ILLNESS
[FreeTextEntry1] : Vannesa is having palpitations almost every day especially at night when lays down but can happen at any time. Lasts a few minutes and resolves. No CP, lightheadedness or dizziness.

## 2022-07-20 NOTE — DISCUSSION/SUMMARY
[FreeTextEntry1] : The patient is a 61-year-old female Hodgkins s/p chemo/rad years ago, hypothyroid, htn, hyperlipidemia, ZAKI, pulmonary fibrosis  s/p COVID with atypical chest pain and palpitations most likely triggered by GERD.\par #1 CV- normal ECG, echo and exercise stress test neg, event monitor today\par #2 Htn- continue HCTZ and metoprolol and diltiazem 30mg 1-2 times a day and reevaluate\par #3 Lipids- continue atorvastatin\par #4 Hypothyroid- continue levothyroxine\par #5 Pulm- ZAKI with nose strip, mild pulm fibrosis, f/u Dr. Reid\par #6 Onc- remote Hodgkins treated with chemo and radiation\par #7 GI- most likely cause of recent symptoms,  pepcid\par #8 General- Laser cataract surgery recovering from one and waiting for the second \par \par 7/20/22 Addendum: There are no cardiac contraindications to endoscopy.

## 2022-07-26 ENCOUNTER — RX RENEWAL (OUTPATIENT)
Age: 62
End: 2022-07-26

## 2022-07-27 ENCOUNTER — RX RENEWAL (OUTPATIENT)
Age: 62
End: 2022-07-27

## 2022-08-08 ENCOUNTER — APPOINTMENT (OUTPATIENT)
Dept: ULTRASOUND IMAGING | Facility: CLINIC | Age: 62
End: 2022-08-08

## 2022-08-08 PROCEDURE — 76700 US EXAM ABDOM COMPLETE: CPT

## 2022-08-09 ENCOUNTER — APPOINTMENT (OUTPATIENT)
Dept: INTERNAL MEDICINE | Facility: CLINIC | Age: 62
End: 2022-08-09

## 2022-08-09 ENCOUNTER — NON-APPOINTMENT (OUTPATIENT)
Age: 62
End: 2022-08-09

## 2022-08-09 VITALS
OXYGEN SATURATION: 98 % | BODY MASS INDEX: 23.36 KG/M2 | WEIGHT: 119 LBS | SYSTOLIC BLOOD PRESSURE: 100 MMHG | DIASTOLIC BLOOD PRESSURE: 70 MMHG | HEIGHT: 60 IN | HEART RATE: 58 BPM

## 2022-08-09 DIAGNOSIS — H25.093 OTHER AGE-RELATED INCIPIENT CATARACT, BILATERAL: ICD-10-CM

## 2022-08-09 PROCEDURE — 99213 OFFICE O/P EST LOW 20 MIN: CPT

## 2022-08-10 PROBLEM — H25.093 OTHER AGE-RELATED INCIPIENT CATARACT OF BOTH EYES: Status: ACTIVE | Noted: 2022-08-10

## 2022-08-11 LAB
ANION GAP SERPL CALC-SCNC: 10 MMOL/L
BASOPHILS # BLD AUTO: 0.04 K/UL
BASOPHILS NFR BLD AUTO: 0.8 %
BUN SERPL-MCNC: 16 MG/DL
CALCIUM SERPL-MCNC: 9.9 MG/DL
CHLORIDE SERPL-SCNC: 105 MMOL/L
CO2 SERPL-SCNC: 27 MMOL/L
CREAT SERPL-MCNC: 0.61 MG/DL
EGFR: 101 ML/MIN/1.73M2
EOSINOPHIL # BLD AUTO: 0.19 K/UL
EOSINOPHIL NFR BLD AUTO: 3.8 %
GLUCOSE SERPL-MCNC: 114 MG/DL
HCT VFR BLD CALC: 41.4 %
HGB BLD-MCNC: 13.4 G/DL
IMM GRANULOCYTES NFR BLD AUTO: 0.2 %
LYMPHOCYTES # BLD AUTO: 1.6 K/UL
LYMPHOCYTES NFR BLD AUTO: 32.1 %
MAN DIFF?: NORMAL
MCHC RBC-ENTMCNC: 30 PG
MCHC RBC-ENTMCNC: 32.4 GM/DL
MCV RBC AUTO: 92.8 FL
MONOCYTES # BLD AUTO: 0.47 K/UL
MONOCYTES NFR BLD AUTO: 9.4 %
NEUTROPHILS # BLD AUTO: 2.67 K/UL
NEUTROPHILS NFR BLD AUTO: 53.7 %
PLATELET # BLD AUTO: 263 K/UL
POTASSIUM SERPL-SCNC: 4.8 MMOL/L
RBC # BLD: 4.46 M/UL
RBC # FLD: 14.1 %
SODIUM SERPL-SCNC: 142 MMOL/L
WBC # FLD AUTO: 4.98 K/UL

## 2022-08-11 NOTE — HISTORY OF PRESENT ILLNESS
[Sleep Apnea] : sleep apnea [(Patient denies any chest pain, claudication, dyspnea on exertion, orthopnea, palpitations or syncope)] : Patient denies any chest pain, claudication, dyspnea on exertion, orthopnea, palpitations or syncope [Moderate (4-6 METs)] : Moderate (4-6 METs) [Aortic Stenosis] : no aortic stenosis [Atrial Fibrillation] : no atrial fibrillation [Coronary Artery Disease] : no coronary artery disease [Recent Myocardial Infarction] : no recent myocardial infarction [Implantable Device/Pacemaker] : no implantable device/pacemaker [Asthma] : no asthma [COPD] : no COPD [Smoker] : not a smoker [Family Member] : no family member with adverse anesthesia reaction/sudden death [Self] : no previous adverse anesthesia reaction [Chronic Anticoagulation] : no chronic anticoagulation [Chronic Kidney Disease] : no chronic kidney disease [Diabetes] : no diabetes [FreeTextEntry1] : cataract laser surgery OD [FreeTextEntry2] : 8/19/22 [FreeTextEntry3] : Dr. Hood [facility] fax to  attention to Shruthi, tel  [FreeTextEntry5] : sleep test: offered with dental device but barely using it. Instead using a  nasal strip with less loud snoring.  [FreeTextEntry4] : JOSÉ HEATON  is a 61 year old female  with history of  HTN, hyperlipidemia, preDM, subclinical hypothyroidism, hodgkin's lymphoma s/p treatment in remission, osteopenia, abnl paps, pulmonary fibrosis presented today for pre-op medical clearance. She got Left eye cataract surgery by Dr. Hood in 4/15/2022. Left eye vision became very clear  but Rt eye's blurred vision bothers her. \par \par # [facility] s/p chemo/RT in remission. Pt was stable with normal CBC, BMP. [facility] \par # Seen by Dr. Mcclelland, Cardiology on 5/24/22 for atypical chest pain and palpitations most likely triggered by GERD. Noted with APC but unremarkable. Cleared for endoscopy.\par # Seen by Dr. Narayan, GI on 7/12/22 for chronic GERD.\par US abdomen 8/8/22\par Several small gallbladder polyps. Follow-up examination in one year's time is recommended. Normal liver parenchyma. 2 small liver cysts were identified. Normal biliary ducts and common bile duct. Visualized portions of pancreas are within normal limits.\par # Pulm- ZAKI with nose strip, mild pulm fibrosis, f/u Dr. Reid.\par Denied fever, chills,CP, SOB, abdominal pain, n/v/c/d.\par  [FreeTextEntry7] : 7/27/21 test\par -ECHO: mild to moderate Aortic valve regurgitation. Normal LV systolic function, mild tricuspid valve regurgitation.\par -Stress test: no chest pain or ischemic change but could not complete the test due to SOB and cough,  5 METs poor result given age and gender. \par \par -ECG on 5/24/22 by cardiology\par Sinus  Bradycardia 53bpm  - frequent PAC s # PACs = 2.-Nonspecific ST depression  -Nondiagnostic. \par \par -Can walk on level ground for an hour without having to stop for chest pain or shortness of breath, has some shortness of breath on walking up hill but chronic for years. No bleeding or clotting disorders, no complications with anesthesia in the past.  [FreeTextEntry8] : can go up flights of stairs slowly.

## 2022-08-11 NOTE — ASSESSMENT
[High Risk Surgery - Intraperitoneal, Intrathoracic or Supringuinal Vascular Procedures] : High Risk Surgery - Intraperitoneal, Intrathoracic or Supringuinal Vascular Procedures - No (0) [Ischemic Heart Disease] : Ischemic Heart Disease - No (0) [Congestive Heart Failure] : Congestive Heart Failure - No (0) [Prior Cerebrovascular Accident or TIA] : Prior Cerebrovascular Accident or TIA - No (0) [Creatinine >= 2mg/dL (1 Point)] : Creatinine >= 2mg/dL - No (0) [Insulin-dependent Diabetic (1 Point)] : Insulin-dependent Diabetic - No (0) [0] : 0 , RCRI Class: I, Risk of Post-Op Cardiac Complications: 3.9%, 95% CI for Risk Estimate: 2.8% - 5.4% [Patient Optimized for Surgery] : Patient optimized for surgery [No Further Testing Recommended] : no further testing recommended [Continue medications as is] : Continue current medications [As per surgery] : as per surgery [FreeTextEntry4] : JOSÉ HEATON  is a 61 year old female  with history of  HTN, hyperlipidemia, preDM, subclinical hypothyroidism, hodgkin's lymphoma s/p treatment in remission, osteopenia, abnl paps, pulmonary fibrosis presented today for pre-op medical clearance. She got Left eye cataract surgery by Dr. Hood in 4/15/2022.\par Pt is low risk candidate for low risk procedure with no further w/up required prior to planned surgery and no contraindication to proceeding with planned surgery.  [FreeTextEntry7] : Do not take Aspirin, NSAID( Motrin, Ibuprofen etc.), Herb, supplement 7 days prior to surgery. Continue take heart medications including Diltiazem 30mg po bid,Metoprolol 50mg po bid with small sips of water in the morning of the operation day. Hold HCTZ 25mg po on the operation day.

## 2022-08-11 NOTE — RESULTS/DATA
[] : results reviewed [de-identified] : CBC: results reviewed, within normal  range \par Chemistries: results reviewed, BMP within normal range \par ECG on 5/24/22  by cardiology:  Sinus  Bradycardia 53bpm  - frequent PAC # PACs = 2.-Nonspecific ST depression  -Nondiagnostic. No change with prior.\par

## 2022-08-11 NOTE — PHYSICAL EXAM
[No Acute Distress] : no acute distress [Well Nourished] : well nourished [Well Developed] : well developed [Well-Appearing] : well-appearing [Normal Sclera/Conjunctiva] : normal sclera/conjunctiva [PERRL] : pupils equal round and reactive to light [EOMI] : extraocular movements intact [Normal Outer Ear/Nose] : the outer ears and nose were normal in appearance [Normal Oropharynx] : the oropharynx was normal [No JVD] : no jugular venous distention [No Lymphadenopathy] : no lymphadenopathy [Supple] : supple [Thyroid Normal, No Nodules] : the thyroid was normal and there were no nodules present [No Respiratory Distress] : no respiratory distress  [No Accessory Muscle Use] : no accessory muscle use [Clear to Auscultation] : lungs were clear to auscultation bilaterally [Normal Rate] : normal rate  [Regular Rhythm] : with a regular rhythm [Normal S1, S2] : normal S1 and S2 [No Carotid Bruits] : no carotid bruits [No Abdominal Bruit] : a ~M bruit was not heard ~T in the abdomen [Pedal Pulses Present] : the pedal pulses are present [No Edema] : there was no peripheral edema [Soft] : abdomen soft [Non Tender] : non-tender [Non-distended] : non-distended [No Masses] : no abdominal mass palpated [No HSM] : no HSM [Normal Bowel Sounds] : normal bowel sounds [Normal Posterior Cervical Nodes] : no posterior cervical lymphadenopathy [Normal Anterior Cervical Nodes] : no anterior cervical lymphadenopathy [No CVA Tenderness] : no CVA  tenderness [No Spinal Tenderness] : no spinal tenderness [No Joint Swelling] : no joint swelling [Grossly Normal Strength/Tone] : grossly normal strength/tone [No Rash] : no rash [Coordination Grossly Intact] : coordination grossly intact [No Focal Deficits] : no focal deficits [Normal Gait] : normal gait [Normal Affect] : the affect was normal [Normal Insight/Judgement] : insight and judgment were intact [de-identified] : no eye discharge [de-identified] : throat is not clouded. [de-identified] : diastolic murmur(2/6) left lower sternal border

## 2022-08-19 ENCOUNTER — APPOINTMENT (OUTPATIENT)
Dept: OPHTHALMOLOGY | Facility: AMBULATORY MEDICAL SERVICES | Age: 62
End: 2022-08-19

## 2022-08-19 ENCOUNTER — NON-APPOINTMENT (OUTPATIENT)
Age: 62
End: 2022-08-19

## 2022-08-19 PROCEDURE — 6698F: CPT

## 2022-08-19 PROCEDURE — 66984 XCAPSL CTRC RMVL W/O ECP: CPT | Mod: RT

## 2022-08-20 ENCOUNTER — APPOINTMENT (OUTPATIENT)
Dept: OPHTHALMOLOGY | Facility: CLINIC | Age: 62
End: 2022-08-20

## 2022-08-20 ENCOUNTER — NON-APPOINTMENT (OUTPATIENT)
Age: 62
End: 2022-08-20

## 2022-08-20 PROCEDURE — 99024 POSTOP FOLLOW-UP VISIT: CPT

## 2022-08-25 ENCOUNTER — NON-APPOINTMENT (OUTPATIENT)
Age: 62
End: 2022-08-25

## 2022-08-25 ENCOUNTER — APPOINTMENT (OUTPATIENT)
Dept: OPHTHALMOLOGY | Facility: CLINIC | Age: 62
End: 2022-08-25

## 2022-08-29 ENCOUNTER — APPOINTMENT (OUTPATIENT)
Dept: OPHTHALMOLOGY | Facility: CLINIC | Age: 62
End: 2022-08-29

## 2022-08-29 ENCOUNTER — NON-APPOINTMENT (OUTPATIENT)
Age: 62
End: 2022-08-29

## 2022-08-29 PROCEDURE — 99024 POSTOP FOLLOW-UP VISIT: CPT

## 2022-09-20 ENCOUNTER — APPOINTMENT (OUTPATIENT)
Dept: OPHTHALMOLOGY | Facility: CLINIC | Age: 62
End: 2022-09-20

## 2022-09-20 ENCOUNTER — NON-APPOINTMENT (OUTPATIENT)
Age: 62
End: 2022-09-20

## 2022-09-20 PROCEDURE — 99024 POSTOP FOLLOW-UP VISIT: CPT

## 2022-09-20 PROCEDURE — 92134 CPTRZ OPH DX IMG PST SGM RTA: CPT

## 2022-09-26 ENCOUNTER — APPOINTMENT (OUTPATIENT)
Dept: OPHTHALMOLOGY | Facility: CLINIC | Age: 62
End: 2022-09-26

## 2022-10-04 ENCOUNTER — APPOINTMENT (OUTPATIENT)
Dept: OPHTHALMOLOGY | Facility: CLINIC | Age: 62
End: 2022-10-04

## 2022-10-18 ENCOUNTER — APPOINTMENT (OUTPATIENT)
Dept: GASTROENTEROLOGY | Facility: CLINIC | Age: 62
End: 2022-10-18

## 2022-10-18 VITALS
SYSTOLIC BLOOD PRESSURE: 154 MMHG | WEIGHT: 119 LBS | HEART RATE: 62 BPM | TEMPERATURE: 97.9 F | DIASTOLIC BLOOD PRESSURE: 84 MMHG | BODY MASS INDEX: 23.36 KG/M2 | OXYGEN SATURATION: 97 % | HEIGHT: 60 IN

## 2022-10-18 PROCEDURE — 99214 OFFICE O/P EST MOD 30 MIN: CPT

## 2022-10-18 PROCEDURE — 99072 ADDL SUPL MATRL&STAF TM PHE: CPT

## 2022-10-18 RX ORDER — ESTRADIOL 0.1 MG/G
0.1 CREAM VAGINAL
Qty: 1 | Refills: 3 | Status: DISCONTINUED | COMMUNITY
Start: 2021-12-08 | End: 2022-10-18

## 2022-10-18 RX ORDER — FAMOTIDINE 10 MG/1
TABLET, FILM COATED ORAL
Refills: 0 | Status: DISCONTINUED | COMMUNITY
End: 2022-10-18

## 2022-10-18 NOTE — HISTORY OF PRESENT ILLNESS
[FreeTextEntry1] : c/o regurg and heartburn, taking Omeprazole, not completely resolved\par could not schedule EGD\par Abd sono showed cysts in left lobe liver and 3 GB polyps 3-4mm\par No NVCD Melena or wt loss\par No NSAIDS\par

## 2022-10-18 NOTE — PHYSICAL EXAM
[Alert] : alert [Normal Voice/Communication] : normal voice/communication [Healthy Appearing] : healthy appearing [No Acute Distress] : no acute distress [Sclera] : the sclera and conjunctiva were normal [Hearing Threshold Finger Rub Not [place]] : hearing was normal [Normal Lips/Gums] : the lips and gums were normal [Oropharynx] : the oropharynx was normal [Normal Appearance] : the appearance of the neck was normal [No Neck Mass] : no neck mass was observed [No Respiratory Distress] : no respiratory distress [No Acc Muscle Use] : no accessory muscle use [Respiration, Rhythm And Depth] : normal respiratory rhythm and effort [Auscultation Breath Sounds / Voice Sounds] : lungs were clear to auscultation bilaterally [Heart Rate And Rhythm] : heart rate was normal and rhythm regular [Normal S1, S2] : normal S1 and S2 [Murmurs] : no murmurs [Bowel Sounds] : normal bowel sounds [Abdomen Tenderness] : non-tender [No Masses] : no abdominal mass palpated [] : no hepatosplenomegaly [Abdomen Soft] : soft [Oriented To Time, Place, And Person] : oriented to person, place, and time

## 2022-10-27 ENCOUNTER — NON-APPOINTMENT (OUTPATIENT)
Age: 62
End: 2022-10-27

## 2022-10-27 ENCOUNTER — APPOINTMENT (OUTPATIENT)
Dept: CARDIOLOGY | Facility: CLINIC | Age: 62
End: 2022-10-27

## 2022-10-27 VITALS
WEIGHT: 119 LBS | OXYGEN SATURATION: 97 % | HEIGHT: 60 IN | HEART RATE: 61 BPM | DIASTOLIC BLOOD PRESSURE: 65 MMHG | BODY MASS INDEX: 23.36 KG/M2 | SYSTOLIC BLOOD PRESSURE: 134 MMHG

## 2022-10-27 PROCEDURE — 99072 ADDL SUPL MATRL&STAF TM PHE: CPT

## 2022-10-27 PROCEDURE — 99214 OFFICE O/P EST MOD 30 MIN: CPT

## 2022-10-27 PROCEDURE — 93000 ELECTROCARDIOGRAM COMPLETE: CPT

## 2022-10-27 NOTE — HISTORY OF PRESENT ILLNESS
[FreeTextEntry1] : Vannesa having lightheadedness 5-10 seconds in the afternoon or evening. Occurs when sitting. Walking is no issue. GERD severe and waiting for EGD.

## 2022-10-27 NOTE — DISCUSSION/SUMMARY
[EKG obtained to assist in diagnosis and management of assessed problem(s)] : EKG obtained to assist in diagnosis and management of assessed problem(s) [FreeTextEntry1] : The patient is a 62-year-old female Hodgkins s/p chemo/rad years ago, hypothyroid, htn, hyperlipidemia, ZAKI, pulmonary fibrosis, GERD with lightheadedness\par #1 CV- normal ECG, echo and exercise stress test neg, event monitor APCs, she feels them and happens on occasion lasting one minute or less.\par #2 Htn- continue HCTZ and metoprolol and diltiazem 30mg 1-2 times a day and reevaluate\par #3 Lipids- continue atorvastatin\par #4 Hypothyroid- continue levothyroxine\par #5 Pulm- ZAKI with nose strip, mild pulm fibrosis, f/u Dr. Reid\par #6 Onc- remote Hodgkins treated with chemo and radiation\par #7 GI- on omeprazole and most likely cause of symptoms, There are no cardiac contraindications to endoscopy.

## 2022-11-07 ENCOUNTER — RX RENEWAL (OUTPATIENT)
Age: 62
End: 2022-11-07

## 2022-11-18 ENCOUNTER — RX RENEWAL (OUTPATIENT)
Age: 62
End: 2022-11-18

## 2023-01-03 ENCOUNTER — APPOINTMENT (OUTPATIENT)
Dept: INTERNAL MEDICINE | Facility: CLINIC | Age: 63
End: 2023-01-03
Payer: COMMERCIAL

## 2023-01-03 VITALS — DIASTOLIC BLOOD PRESSURE: 80 MMHG | SYSTOLIC BLOOD PRESSURE: 125 MMHG

## 2023-01-03 VITALS
SYSTOLIC BLOOD PRESSURE: 152 MMHG | HEIGHT: 60 IN | HEART RATE: 78 BPM | DIASTOLIC BLOOD PRESSURE: 88 MMHG | OXYGEN SATURATION: 97 %

## 2023-01-03 PROCEDURE — 99213 OFFICE O/P EST LOW 20 MIN: CPT

## 2023-01-03 PROCEDURE — 99072 ADDL SUPL MATRL&STAF TM PHE: CPT

## 2023-01-08 NOTE — HISTORY OF PRESENT ILLNESS
[FreeTextEntry1] : f/up  [de-identified] : JOSÉ HEATON  is a 62 year old female  with history of  Hodgkin's lymphoma, pulmonary fibrosis, bronchiectasis, GERD, LBP, HTN, HLD, ZAKI, osteopenia presented today for medication renewal. \par Last CPE was by Dr. Blount in 9/2021. \par She has been stable except severe chronic GERD. Will get EGD by Dr. Silva on 1/10/22 and on Prilosec 20mg po qhs.\par She has her routine medication running out and requested refill  but she did not see Dr. Blount over for  1year and had to come to office for evaluation. She did take her all medications regularly as directed.\par Denied fever, chills,CP, SOB, abdominal pain, n/v/c/d.

## 2023-01-08 NOTE — PHYSICAL EXAM
[Soft] : abdomen soft [Non Tender] : non-tender [Non-distended] : non-distended [No HSM] : no HSM [Normal Bowel Sounds] : normal bowel sounds [de-identified] : WDWN in NAD\par HEENT:  unremarkable\par Neck:  supple, no JVD, no LN\par Lungs:  CTA B/L, no W/R/R\par Heart:  Reg rate, +S1S2, no M/R/G\par Abdomen:  soft, NT, ND, +BS, no masses, no HS-megaly\par Genital: No pubic or genital lesions noted.\par Ext:  no C/C/E\par Neuro:  no focal deficits

## 2023-01-08 NOTE — ASSESSMENT
[FreeTextEntry1] : JOSÉ HEATON  is a 62 year old female  with history of  Hodgkin's lymphoma, pulmonary fibrosis, bronchiectasis, GERD, LBP, HTN, HLD, ZAKI, osteopenia presented today for medication renewal. \par  \par # HTN\par BP is acceptable. BP decreased to 125/80 at the end of visit. \par Continue current management including low salt diet and regular exercise.\par Take Diltiazem 30mg bid, HCTZ 25mg qd, metoprolol 50m qd.\par \par # GERD\par Physical unremarkable.\par Avoid big portion of meal, fatty, spicy, salty diet. \par Avoid late eating within 3 hours of sleeping time. \par Take Prilosec 20mg po qd in empty stomach.\par \par She made appointment with Dr. Blount for CPE in April 2023.

## 2023-01-10 ENCOUNTER — RESULT REVIEW (OUTPATIENT)
Age: 63
End: 2023-01-10

## 2023-01-10 ENCOUNTER — APPOINTMENT (OUTPATIENT)
Dept: CARDIOLOGY | Facility: CLINIC | Age: 63
End: 2023-01-10
Payer: COMMERCIAL

## 2023-01-10 ENCOUNTER — APPOINTMENT (OUTPATIENT)
Dept: ELECTROPHYSIOLOGY | Facility: CLINIC | Age: 63
End: 2023-01-10
Payer: COMMERCIAL

## 2023-01-10 ENCOUNTER — APPOINTMENT (OUTPATIENT)
Dept: GASTROENTEROLOGY | Facility: HOSPITAL | Age: 63
End: 2023-01-10
Payer: COMMERCIAL

## 2023-01-10 ENCOUNTER — TRANSCRIPTION ENCOUNTER (OUTPATIENT)
Age: 63
End: 2023-01-10

## 2023-01-10 ENCOUNTER — OUTPATIENT (OUTPATIENT)
Dept: OUTPATIENT SERVICES | Facility: HOSPITAL | Age: 63
LOS: 1 days | End: 2023-01-10
Payer: SELF-PAY

## 2023-01-10 ENCOUNTER — NON-APPOINTMENT (OUTPATIENT)
Age: 63
End: 2023-01-10

## 2023-01-10 VITALS
OXYGEN SATURATION: 100 % | SYSTOLIC BLOOD PRESSURE: 165 MMHG | RESPIRATION RATE: 18 BRPM | HEART RATE: 80 BPM | DIASTOLIC BLOOD PRESSURE: 77 MMHG

## 2023-01-10 VITALS
OXYGEN SATURATION: 99 % | SYSTOLIC BLOOD PRESSURE: 182 MMHG | BODY MASS INDEX: 22.78 KG/M2 | DIASTOLIC BLOOD PRESSURE: 50 MMHG | HEART RATE: 74 BPM | WEIGHT: 116 LBS | HEIGHT: 60 IN

## 2023-01-10 VITALS
HEART RATE: 85 BPM | DIASTOLIC BLOOD PRESSURE: 67 MMHG | OXYGEN SATURATION: 100 % | WEIGHT: 115.96 LBS | SYSTOLIC BLOOD PRESSURE: 180 MMHG | HEIGHT: 60 IN | RESPIRATION RATE: 18 BRPM | TEMPERATURE: 98 F

## 2023-01-10 DIAGNOSIS — Z98.890 OTHER SPECIFIED POSTPROCEDURAL STATES: Chronic | ICD-10-CM

## 2023-01-10 DIAGNOSIS — Z98.49 CATARACT EXTRACTION STATUS, UNSPECIFIED EYE: Chronic | ICD-10-CM

## 2023-01-10 DIAGNOSIS — Z98.891 HISTORY OF UTERINE SCAR FROM PREVIOUS SURGERY: Chronic | ICD-10-CM

## 2023-01-10 DIAGNOSIS — K21.9 GASTRO-ESOPHAGEAL REFLUX DISEASE WITHOUT ESOPHAGITIS: ICD-10-CM

## 2023-01-10 PROCEDURE — 99072 ADDL SUPL MATRL&STAF TM PHE: CPT

## 2023-01-10 PROCEDURE — 88305 TISSUE EXAM BY PATHOLOGIST: CPT

## 2023-01-10 PROCEDURE — 93000 ELECTROCARDIOGRAM COMPLETE: CPT

## 2023-01-10 PROCEDURE — 43239 EGD BIOPSY SINGLE/MULTIPLE: CPT

## 2023-01-10 PROCEDURE — 88305 TISSUE EXAM BY PATHOLOGIST: CPT | Mod: 26

## 2023-01-10 PROCEDURE — 99214 OFFICE O/P EST MOD 30 MIN: CPT

## 2023-01-10 DEVICE — NET RETRV ROT ROTH 2.5MMX230CM: Type: IMPLANTABLE DEVICE | Status: FUNCTIONAL

## 2023-01-10 RX ORDER — LEVOTHYROXINE SODIUM 125 MCG
0 TABLET ORAL
Qty: 0 | Refills: 0 | DISCHARGE

## 2023-01-10 RX ORDER — ATORVASTATIN CALCIUM 80 MG/1
0 TABLET, FILM COATED ORAL
Qty: 0 | Refills: 0 | DISCHARGE

## 2023-01-10 RX ORDER — HYDROCHLOROTHIAZIDE 25 MG
1 TABLET ORAL
Qty: 0 | Refills: 0 | DISCHARGE

## 2023-01-10 RX ORDER — SODIUM CHLORIDE 9 MG/ML
500 INJECTION INTRAMUSCULAR; INTRAVENOUS; SUBCUTANEOUS
Refills: 0 | Status: COMPLETED | OUTPATIENT
Start: 2023-01-10 | End: 2023-01-10

## 2023-01-10 RX ORDER — METOPROLOL TARTRATE 50 MG
1 TABLET ORAL
Qty: 0 | Refills: 0 | DISCHARGE

## 2023-01-10 RX ORDER — DILTIAZEM HCL 120 MG
1 CAPSULE, EXT RELEASE 24 HR ORAL
Qty: 0 | Refills: 0 | DISCHARGE

## 2023-01-10 RX ORDER — IBUPROFEN 200 MG
1 TABLET ORAL
Qty: 0 | Refills: 0 | DISCHARGE

## 2023-01-10 RX ORDER — ACETAMINOPHEN 500 MG
3 TABLET ORAL
Qty: 0 | Refills: 0 | DISCHARGE

## 2023-01-10 RX ADMIN — SODIUM CHLORIDE 30 MILLILITER(S): 9 INJECTION INTRAMUSCULAR; INTRAVENOUS; SUBCUTANEOUS at 15:00

## 2023-01-10 NOTE — ASU DISCHARGE PLAN (ADULT/PEDIATRIC) - NS MD DC FALL RISK RISK
For information on Fall & Injury Prevention, visit: [email or web address] OR  [email or web address] OR  https://www.cdc.gov/steadi/patient.html

## 2023-01-10 NOTE — ASU PATIENT PROFILE, ADULT - NSICDXPASTMEDICALHX_GEN_ALL_CORE_FT
PAST MEDICAL HISTORY:  Abnormal Pap smear of cervix     Borderline diabetes     Hodgkin lymphoma dx 2001, s/p chemo and radiation last dose 2002    Hypertension     Sleep apnea

## 2023-01-10 NOTE — HISTORY OF PRESENT ILLNESS
[FreeTextEntry1] : Vannesa was sent urgently here today. She was in endoscopy waiting for EGD. She was very nervous. They told her that when they laid her down her heart rate became very slow and anesthesia was concerned. They sat her up and it got better but procedure not done. Sent downstairs. No lightheadedness or dizziness. Just nervous.

## 2023-01-10 NOTE — REVIEW OF SYSTEMS
[SOB] : shortness of breath [Dyspnea on exertion] : dyspnea during exertion [Lower Ext Edema] : no extremity edema [Leg Claudication] : no intermittent leg claudication [Palpitations] : no palpitations [Syncope] : no syncope [Negative] : Heme/Lymph

## 2023-01-10 NOTE — PRE PROCEDURE NOTE - PRE PROCEDURE EVALUATION
Attending Physician:  Dr. Silva                Procedure: EGD EUS    Indication for Procedure: abd pain GB polyps  ________________________________________________________  PAST MEDICAL & SURGICAL HISTORY:  Hypertension      Abnormal Pap smear of cervix      Sleep apnea      Hodgkin lymphoma  dx , s/p chemo and radiation last dose       Borderline diabetes      S/P   , , ,       H/O surgical biopsy  -uterine      S/P cataract surgery        ALLERGIES:  latex (Other)  Sudafed 12-Hour (Other)    HOME MEDICATIONS:  atorvastatin: milligram(s) orally once a day  dilTIAZem 30 mg oral tablet: 1 tab(s) orally once a day  hydroCHLOROthiazide 12.5 mg oral capsule: 1 cap(s) orally once a day  ibuprofen 600 mg oral tablet: 1 tab(s) orally every 6 hours, As Needed  levothyroxine: microgram(s) orally once a day  Metoprolol Succinate ER 50 mg oral tablet, extended release: 1 tab(s) orally once a day  Tylenol 325 mg oral tablet: 3 tab(s) orally every 6 hours, As Needed    AICD/PPM: [ ] yes   [ x] no    PERTINENT LAB DATA:                      PHYSICAL EXAMINATION:    Height (cm): 152.4  Weight (kg): 52.6  BMI (kg/m2): 22.6  BSA (m2): 1.48T(C): 36.7  HR: 85  BP: 180/67  RR: 18  SpO2: 100%    Constitutional: NAD  HEENT: PERRLA, EOMI,    Neck:  No JVD  Respiratory: CTAB/L  Cardiovascular: S1 and S2  Gastrointestinal: BS+, soft, NT/ND  Extremities: No peripheral edema  Neurological: A/O x 3, no focal deficits  Psychiatric: Normal mood, normal affect  Skin: No rashes    ASA Class: I [ ]  II [x ]  III [ ]  IV [ ]    COMMENTS:    The patient is a suitable candidate for the planned procedure unless box checked [ ]  No, explain:

## 2023-01-10 NOTE — DISCUSSION/SUMMARY
[FreeTextEntry1] : The patient is a 62-year-old female Hodgkins s/p chemo/rad years ago, hypothyroid, htn, hyperlipidemia, ZAKI, pulmonary fibrosis, GERD with bradycardia at endoscopy\par #1 CV- normal ECG, echo and exercise stress test neg, event monitor APCs in past, event monitor today.\par #2 Htn- continue HCTZ and metoprolol and diltiazem 30mg 1-2 times a day and reevaluate\par #3 Lipids- continue atorvastatin\par #4 Hypothyroid- continue levothyroxine\par #5 Pulm- ZAKI with nose strip, mild pulm fibrosis, f/u Dr. Reid\par #6 Onc- remote Hodgkins treated with chemo and radiation\par #7 GI- on omeprazole and most likely cause of symptoms, EGD pending  [EKG obtained to assist in diagnosis and management of assessed problem(s)] : EKG obtained to assist in diagnosis and management of assessed problem(s)

## 2023-01-10 NOTE — ASU PATIENT PROFILE, ADULT - FALL HARM RISK - UNIVERSAL INTERVENTIONS
Bed in lowest position, wheels locked, appropriate side rails in place/Call bell, personal items and telephone in reach/Instruct patient to call for assistance before getting out of bed or chair/Non-slip footwear when patient is out of bed/[place] to call system/Physically safe environment - no spills, clutter or unnecessary equipment/Purposeful Proactive Rounding/Room/bathroom lighting operational, light cord in reach

## 2023-01-11 LAB — SURGICAL PATHOLOGY STUDY: SIGNIFICANT CHANGE UP

## 2023-01-11 RX ORDER — DICYCLOMINE HYDROCHLORIDE 20 MG/1
20 TABLET ORAL
Qty: 21 | Refills: 0 | Status: DISCONTINUED | COMMUNITY
Start: 2023-01-11 | End: 2023-01-11

## 2023-01-13 PROBLEM — R73.03 PREDIABETES: Chronic | Status: ACTIVE | Noted: 2023-01-10

## 2023-01-23 ENCOUNTER — FORM ENCOUNTER (OUTPATIENT)
Age: 63
End: 2023-01-23

## 2023-01-23 ENCOUNTER — APPOINTMENT (OUTPATIENT)
Dept: ELECTROPHYSIOLOGY | Facility: CLINIC | Age: 63
End: 2023-01-23
Payer: COMMERCIAL

## 2023-01-23 PROCEDURE — 99072 ADDL SUPL MATRL&STAF TM PHE: CPT

## 2023-01-23 PROCEDURE — 95800 SLP STDY UNATTENDED: CPT | Mod: TC

## 2023-01-31 ENCOUNTER — APPOINTMENT (OUTPATIENT)
Dept: GASTROENTEROLOGY | Facility: CLINIC | Age: 63
End: 2023-01-31
Payer: COMMERCIAL

## 2023-01-31 VITALS
BODY MASS INDEX: 22.78 KG/M2 | TEMPERATURE: 97.8 F | OXYGEN SATURATION: 97 % | WEIGHT: 116 LBS | DIASTOLIC BLOOD PRESSURE: 88 MMHG | SYSTOLIC BLOOD PRESSURE: 142 MMHG | HEIGHT: 60 IN | HEART RATE: 94 BPM

## 2023-01-31 PROCEDURE — 99072 ADDL SUPL MATRL&STAF TM PHE: CPT

## 2023-01-31 PROCEDURE — 99214 OFFICE O/P EST MOD 30 MIN: CPT

## 2023-01-31 NOTE — PHYSICAL EXAM

## 2023-01-31 NOTE — ASSESSMENT
[FreeTextEntry1] : Normal endoscopy with nonspecific gastritis\par Heartburn improved after discontinuing calcium channel blockers\par Cardiac arrhythmia/APCs\par \par

## 2023-01-31 NOTE — HISTORY OF PRESENT ILLNESS
[FreeTextEntry1] : JOSÉ HEATON 62 year She came for follow up and discussion of recent EGD and biopsy findings.\par Gastric Biopsy showed non specific gastritis and no Helicobacter Pylori or GIM .Patient reports improvement and relief of abdominal  bloating,fullness/discomfort and heartburn after stopping to take Dilitiazem .\par She reports regular Bowel movements with no BPR or melena. \par She was seen by her cardiologist Dr. Mcclelland and had EP study and loop monitoring for evaluation of her arrhythmia.  She was advised to stop diltiazem and metoprolol for few weeks to see if her symptoms of bradycardia or  lightheadedness will improve.\par

## 2023-02-09 PROCEDURE — 99072 ADDL SUPL MATRL&STAF TM PHE: CPT

## 2023-02-09 PROCEDURE — 93244 EXT ECG>48HR<7D REV&INTERPJ: CPT

## 2023-03-27 ENCOUNTER — APPOINTMENT (OUTPATIENT)
Dept: MAMMOGRAPHY | Facility: CLINIC | Age: 63
End: 2023-03-27

## 2023-04-24 ENCOUNTER — APPOINTMENT (OUTPATIENT)
Dept: INTERNAL MEDICINE | Facility: CLINIC | Age: 63
End: 2023-04-24

## 2023-04-24 ENCOUNTER — APPOINTMENT (OUTPATIENT)
Dept: OTOLARYNGOLOGY | Facility: CLINIC | Age: 63
End: 2023-04-24

## 2023-06-04 PROBLEM — Z01.812 ENCOUNTER FOR PREPROCEDURE SCREENING LABORATORY TESTING FOR COVID-19: Status: RESOLVED | Noted: 2022-07-12 | Resolved: 2023-06-04

## 2023-06-04 PROBLEM — Z01.818 PREOP EXAMINATION: Status: RESOLVED | Noted: 2022-02-08 | Resolved: 2023-06-04

## 2023-06-05 ENCOUNTER — APPOINTMENT (OUTPATIENT)
Dept: INTERNAL MEDICINE | Facility: CLINIC | Age: 63
End: 2023-06-05
Payer: COMMERCIAL

## 2023-06-05 VITALS — SYSTOLIC BLOOD PRESSURE: 110 MMHG | DIASTOLIC BLOOD PRESSURE: 60 MMHG

## 2023-06-05 VITALS
OXYGEN SATURATION: 98 % | HEART RATE: 81 BPM | SYSTOLIC BLOOD PRESSURE: 140 MMHG | DIASTOLIC BLOOD PRESSURE: 70 MMHG | BODY MASS INDEX: 22.97 KG/M2 | WEIGHT: 117 LBS | HEIGHT: 60 IN

## 2023-06-05 DIAGNOSIS — F41.9 ANXIETY DISORDER, UNSPECIFIED: ICD-10-CM

## 2023-06-05 DIAGNOSIS — Z87.898 PERSONAL HISTORY OF OTHER SPECIFIED CONDITIONS: ICD-10-CM

## 2023-06-05 DIAGNOSIS — Z01.818 ENCOUNTER FOR OTHER PREPROCEDURAL EXAMINATION: ICD-10-CM

## 2023-06-05 DIAGNOSIS — D72.819 DECREASED WHITE BLOOD CELL COUNT, UNSPECIFIED: ICD-10-CM

## 2023-06-05 DIAGNOSIS — Z20.822 ENCOUNTER FOR PREPROCEDURAL LABORATORY EXAMINATION: ICD-10-CM

## 2023-06-05 DIAGNOSIS — R10.2 PELVIC AND PERINEAL PAIN: ICD-10-CM

## 2023-06-05 DIAGNOSIS — Z87.19 PERSONAL HISTORY OF OTHER DISEASES OF THE DIGESTIVE SYSTEM: ICD-10-CM

## 2023-06-05 DIAGNOSIS — K82.4 CHOLESTEROLOSIS OF GALLBLADDER: ICD-10-CM

## 2023-06-05 DIAGNOSIS — Z82.0 FAMILY HISTORY OF EPILEPSY AND OTHER DISEASES OF THE NERVOUS SYSTEM: ICD-10-CM

## 2023-06-05 DIAGNOSIS — Z01.812 ENCOUNTER FOR PREPROCEDURAL LABORATORY EXAMINATION: ICD-10-CM

## 2023-06-05 PROCEDURE — 99396 PREV VISIT EST AGE 40-64: CPT

## 2023-06-05 RX ORDER — LACTOBACILLUS RHAMNOSUS GG 10B CELL
CAPSULE ORAL
Refills: 0 | Status: DISCONTINUED | COMMUNITY
End: 2023-06-05

## 2023-06-05 RX ORDER — LEVOTHYROXINE SODIUM 0.05 MG/1
50 TABLET ORAL
Qty: 90 | Refills: 3 | Status: ACTIVE | COMMUNITY
Start: 2019-04-01 | End: 1900-01-01

## 2023-06-05 RX ORDER — DILTIAZEM HYDROCHLORIDE 30 MG/1
30 TABLET ORAL
Qty: 180 | Refills: 3 | Status: DISCONTINUED | COMMUNITY
Start: 2022-01-11 | End: 2023-06-05

## 2023-06-06 ENCOUNTER — APPOINTMENT (OUTPATIENT)
Dept: OBGYN | Facility: CLINIC | Age: 63
End: 2023-06-06
Payer: COMMERCIAL

## 2023-06-06 VITALS
SYSTOLIC BLOOD PRESSURE: 134 MMHG | HEIGHT: 60 IN | BODY MASS INDEX: 23.04 KG/M2 | DIASTOLIC BLOOD PRESSURE: 77 MMHG | WEIGHT: 117.38 LBS

## 2023-06-06 DIAGNOSIS — Z01.419 ENCOUNTER FOR GYNECOLOGICAL EXAMINATION (GENERAL) (ROUTINE) W/OUT ABNORMAL FINDINGS: ICD-10-CM

## 2023-06-06 PROCEDURE — 99396 PREV VISIT EST AGE 40-64: CPT

## 2023-06-06 RX ORDER — ESTRADIOL 10 UG/1
10 TABLET, FILM COATED VAGINAL
Qty: 1 | Refills: 5 | Status: DISCONTINUED | COMMUNITY
Start: 2021-04-22 | End: 2023-06-06

## 2023-06-07 LAB — HPV HIGH+LOW RISK DNA PNL CVX: NOT DETECTED

## 2023-06-09 ENCOUNTER — TRANSCRIPTION ENCOUNTER (OUTPATIENT)
Age: 63
End: 2023-06-09

## 2023-06-09 LAB — CYTOLOGY CVX/VAG DOC THIN PREP: NORMAL

## 2023-06-19 ENCOUNTER — TRANSCRIPTION ENCOUNTER (OUTPATIENT)
Age: 63
End: 2023-06-19

## 2023-06-24 PROBLEM — R10.2 PELVIC PRESSURE IN FEMALE: Status: RESOLVED | Noted: 2018-11-15 | Resolved: 2023-06-24

## 2023-06-24 PROBLEM — Z87.898 HISTORY OF EPIGASTRIC PAIN: Status: RESOLVED | Noted: 2022-07-12 | Resolved: 2023-06-24

## 2023-06-24 PROBLEM — Z87.19 HISTORY OF BILIARY COLIC: Status: RESOLVED | Noted: 2023-01-11 | Resolved: 2023-06-24

## 2023-06-24 PROBLEM — K82.4 GALLBLADDER POLYP: Status: ACTIVE | Noted: 2022-08-09

## 2023-06-24 PROBLEM — R10.2 FEMALE PELVIC PAIN: Status: RESOLVED | Noted: 2021-11-18 | Resolved: 2023-06-24

## 2023-07-04 ENCOUNTER — TRANSCRIPTION ENCOUNTER (OUTPATIENT)
Age: 63
End: 2023-07-04

## 2023-07-04 LAB
25(OH)D3 SERPL-MCNC: 40.1 NG/ML
ALBUMIN SERPL ELPH-MCNC: 4.6 G/DL
ALP BLD-CCNC: 86 U/L
ALT SERPL-CCNC: 14 U/L
ANION GAP SERPL CALC-SCNC: 13 MMOL/L
AST SERPL-CCNC: 21 U/L
BILIRUB SERPL-MCNC: 0.4 MG/DL
BUN SERPL-MCNC: 21 MG/DL
CALCIUM SERPL-MCNC: 10.1 MG/DL
CHLORIDE SERPL-SCNC: 103 MMOL/L
CHOLEST SERPL-MCNC: 220 MG/DL
CO2 SERPL-SCNC: 28 MMOL/L
CREAT SERPL-MCNC: 0.62 MG/DL
EGFR: 100 ML/MIN/1.73M2
ESTIMATED AVERAGE GLUCOSE: 131 MG/DL
GLUCOSE SERPL-MCNC: 112 MG/DL
HBA1C MFR BLD HPLC: 6.2 %
HDLC SERPL-MCNC: 93 MG/DL
LDLC SERPL CALC-MCNC: 103 MG/DL
NONHDLC SERPL-MCNC: 127 MG/DL
POTASSIUM SERPL-SCNC: 4.6 MMOL/L
PROT SERPL-MCNC: 7.3 G/DL
SODIUM SERPL-SCNC: 145 MMOL/L
TRIGL SERPL-MCNC: 121 MG/DL
TSH SERPL-ACNC: 1.37 UIU/ML

## 2023-07-04 NOTE — ASSESSMENT
[FreeTextEntry1] : 63 yo female with h/o as above including HTN, hyperlipidemia, preDM, subclinical hypothyroidism, GERD, osteopenia, ZAKI, hodgkin's lymphoma s/p chemo and radiation, pulmonary fibrosis, gallbladder polyp, here for CPE.\par 1.  CV - bp at goal, check bmp, check lipids, follows with cardiology\par 2.  Pulm - overdue to f/up with pulmonary, rx CT chest given to f/up pulmonary fibrosis\par 3.  GI - to take PPI when needed for GERD, reports had EGD, colonoscopy utd; rx abdominal US given to f/up gallbladder polyp\par 4.  Gyn - will f/up with gyn tomorrow for pap; rx mammo given as due\par 5.  Endo - check dexa for osteopenia as due, vitamin D; check tsh on synthroid, check A1c for preDM\par 6.  Heme-onc - referred to cancer survivorship Dr. Sharon Gurrola to establish  \par 7.  Psych - rx valium given to use sparingly\par 8.  HCM - check below labs; consider shingrix\par 9.  RTO prn or 1 year (if following with cardiology twice/year for bp checks)\par \par \par

## 2023-07-04 NOTE — PHYSICAL EXAM
[No Acute Distress] : no acute distress [Well Nourished] : well nourished [Well Developed] : well developed [Well-Appearing] : well-appearing [EOMI] : extraocular movements intact [PERRL] : pupils equal round and reactive to light [Normal Oropharynx] : the oropharynx was normal [Normal TMs] : both tympanic membranes were normal [No Lymphadenopathy] : no lymphadenopathy [Thyroid Normal, No Nodules] : the thyroid was normal and there were no nodules present [Supple] : supple [No Respiratory Distress] : no respiratory distress  [No Accessory Muscle Use] : no accessory muscle use [Clear to Auscultation] : lungs were clear to auscultation bilaterally [Normal Rate] : normal rate  [Regular Rhythm] : with a regular rhythm [Normal S1, S2] : normal S1 and S2 [No Murmur] : no murmur heard [No Carotid Bruits] : no carotid bruits [Pedal Pulses Present] : the pedal pulses are present [Normal Appearance] : normal in appearance [No Edema] : there was no peripheral edema [No Nipple Discharge] : no nipple discharge [No Axillary Lymphadenopathy] : no axillary lymphadenopathy [Soft] : abdomen soft [Non Tender] : non-tender [Non-distended] : non-distended [No Masses] : no abdominal mass palpated [No HSM] : no HSM [Normal Bowel Sounds] : normal bowel sounds [Normal Supraclavicular Nodes] : no supraclavicular lymphadenopathy [Normal Axillary Nodes] : no axillary lymphadenopathy [Normal Posterior Cervical Nodes] : no posterior cervical lymphadenopathy [Normal Anterior Cervical Nodes] : no anterior cervical lymphadenopathy [Normal Inguinal Nodes] : no inguinal lymphadenopathy [No Joint Swelling] : no joint swelling [No Rash] : no rash [Normal Gait] : normal gait [Normal Affect] : the affect was normal [de-identified] : horizontal scar upper mid chest [de-identified] : lower abdominal vertical midline scar

## 2023-07-04 NOTE — HEALTH RISK ASSESSMENT
[Patient reported mammogram was normal] : Patient reported mammogram was normal [Patient reported PAP Smear was abnormal] : Patient reported PAP Smear was abnormal [Patient reported bone density results were abnormal] : Patient reported bone density results were abnormal [0] : 2) Feeling down, depressed, or hopeless: Not at all (0) [MammogramDate] : 07/21 [PapSmearDate] : 04/21 [PapSmearComments] : ASC-H, awaiting repeat tomorrow [BoneDensityDate] : 07/21 [BoneDensityComments] : osteopenia [ColonoscopyComments] : utd

## 2023-07-04 NOTE — HISTORY OF PRESENT ILLNESS
[FreeTextEntry1] : physical [de-identified] : 63 yo female with h/o as below here for CPE.\par Had b/l cataract surgery, then had EGD for GERD.  \par If doesn't sleep (if can't sleep at all), will have tremors the next day.  Saw another pcp ('s pcp), gave her diazepam and it helped her.  Will take it on occasion when it occurs.  Asking for refill of a few pills, would cut in half.\par Would have GERD, was on omeprazole but stopped it, sometimes would have to go on intermittent fasting and GI symptoms would improve.  Would have lightheadedness that she was attributing to GERD, would have chest tightness that was due to GERD, would wake up in middle of night from sleep gasping for breath, would have backflow of acid as well.  Was seeing cardiologist for symptoms as well as GI and had cardiac w/up done.  \par Had sleep study done for mild ZAKI, didn't find change from prior.\par Was checking bp, bp was 116/64, 198h-265m-014m/60s-70s.  \par No other active issues.

## 2023-07-04 NOTE — REVIEW OF SYSTEMS
[Dryness] : dryness  [Cough] : cough [Negative] : Musculoskeletal [Fever] : no fever [Chills] : no chills [Fatigue] : no fatigue [Recent Change In Weight] : ~T no recent weight change [Chest Pain] : no chest pain [Palpitations] : no palpitations [Shortness Of Breath] : no shortness of breath [Abdominal Pain] : no abdominal pain [Nausea] : no nausea [Constipation] : no constipation [Diarrhea] : diarrhea [Vomiting] : no vomiting [Heartburn] : no heartburn [Melena] : no melena [Dysuria] : no dysuria [Vaginal Discharge] : no vaginal discharge [Skin Rash] : no skin rash [Headache] : no headache [Dizziness] : no dizziness [Fainting] : no fainting [Anxiety] : no anxiety [Depression] : no depression [Easy Bleeding] : no easy bleeding [Easy Bruising] : no easy bruising [FreeTextEntry3] : dry eyes, sees optho [FreeTextEntry8] : vaginal dryness [FreeTextEntry6] : cough not significant, SOB less with exertion than before [de-identified] : sees derm, has itchy spot on left leg [de-identified] : rare HA

## 2023-07-04 NOTE — ADDENDUM
[FreeTextEntry1] : 7/4/23:  Reviewed labs, nl except A1c 6.2 c/w stable preDM to work on diet/exercise, other labs nl - mailed/messaged to pt.

## 2023-09-18 ENCOUNTER — APPOINTMENT (OUTPATIENT)
Dept: MAMMOGRAPHY | Facility: IMAGING CENTER | Age: 63
End: 2023-09-18

## 2023-09-18 ENCOUNTER — APPOINTMENT (OUTPATIENT)
Dept: RADIOLOGY | Facility: IMAGING CENTER | Age: 63
End: 2023-09-18

## 2023-09-18 ENCOUNTER — APPOINTMENT (OUTPATIENT)
Dept: CT IMAGING | Facility: IMAGING CENTER | Age: 63
End: 2023-09-18

## 2023-10-06 ENCOUNTER — NON-APPOINTMENT (OUTPATIENT)
Age: 63
End: 2023-10-06

## 2023-10-10 DIAGNOSIS — Z00.00 ENCOUNTER FOR GENERAL ADULT MEDICAL EXAMINATION W/OUT ABNORMAL FINDINGS: ICD-10-CM

## 2023-10-12 ENCOUNTER — NON-APPOINTMENT (OUTPATIENT)
Age: 63
End: 2023-10-12

## 2023-10-13 DIAGNOSIS — N64.4 MASTODYNIA: ICD-10-CM

## 2023-10-17 NOTE — H&P PST ADULT - DOCUMENT STATUS
The patient has hypercalcemia that is currently uncontrolled. The patient has the following symptoms due to their hypercalcemia: asymptomatic at present, however may be contributing to disla obstruction. The hypercalcemia is likely due to poor oral intake and dehydration, and bedbound status. We will obtain the following labs to work up the hypercalcemia:   PTH   PTH, Intact   Date Value Ref Range Status   10/07/2023 12.9 9.0 - 77.0 pg/mL Final    . We will treat the hypercalcemia with: IV fluids ordered at a rate of 200cc ml/hr. Their latest calcium has been reviewed and is listed below.  Ionized Calcium   Date Value Ref Range Status   10/07/2023 1.65 (H) 1.06 - 1.42 mmol/L Final       - Continue fluids with NS 200cc/hr  - encourage oral rehydration  - Consider initiation of bisphosphonates or calcitonin for chronic hypercalcemia  - Previous SPEP and smear WNL at [facility]; f/u [facility], immunoglobbulins, light chains as well as PTHr   Authored by Resident/PA/NP

## 2023-11-06 ENCOUNTER — APPOINTMENT (OUTPATIENT)
Dept: PULMONOLOGY | Facility: CLINIC | Age: 63
End: 2023-11-06
Payer: MEDICAID

## 2023-11-06 VITALS
HEART RATE: 88 BPM | HEIGHT: 60 IN | WEIGHT: 122 LBS | TEMPERATURE: 97.8 F | BODY MASS INDEX: 23.95 KG/M2 | DIASTOLIC BLOOD PRESSURE: 81 MMHG | RESPIRATION RATE: 15 BRPM | OXYGEN SATURATION: 96 % | SYSTOLIC BLOOD PRESSURE: 132 MMHG

## 2023-11-06 PROCEDURE — 99214 OFFICE O/P EST MOD 30 MIN: CPT

## 2023-11-20 ENCOUNTER — RESULT REVIEW (OUTPATIENT)
Age: 63
End: 2023-11-20

## 2023-11-20 ENCOUNTER — APPOINTMENT (OUTPATIENT)
Dept: ULTRASOUND IMAGING | Facility: CLINIC | Age: 63
End: 2023-11-20
Payer: MEDICAID

## 2023-11-20 ENCOUNTER — APPOINTMENT (OUTPATIENT)
Dept: MAMMOGRAPHY | Facility: CLINIC | Age: 63
End: 2023-11-20
Payer: MEDICAID

## 2023-11-20 PROCEDURE — 76641 ULTRASOUND BREAST COMPLETE: CPT | Mod: 50

## 2023-11-20 PROCEDURE — 77063 BREAST TOMOSYNTHESIS BI: CPT

## 2023-11-20 PROCEDURE — 77067 SCR MAMMO BI INCL CAD: CPT

## 2023-11-28 ENCOUNTER — APPOINTMENT (OUTPATIENT)
Dept: ULTRASOUND IMAGING | Facility: CLINIC | Age: 63
End: 2023-11-28

## 2023-11-28 ENCOUNTER — APPOINTMENT (OUTPATIENT)
Dept: CT IMAGING | Facility: CLINIC | Age: 63
End: 2023-11-28

## 2024-01-22 ENCOUNTER — APPOINTMENT (OUTPATIENT)
Dept: PULMONOLOGY | Facility: CLINIC | Age: 64
End: 2024-01-22

## 2024-01-29 ENCOUNTER — APPOINTMENT (OUTPATIENT)
Dept: OPHTHALMOLOGY | Facility: CLINIC | Age: 64
End: 2024-01-29
Payer: MEDICAID

## 2024-01-29 ENCOUNTER — NON-APPOINTMENT (OUTPATIENT)
Age: 64
End: 2024-01-29

## 2024-01-29 PROCEDURE — 68761 CLOSE TEAR DUCT OPENING: CPT | Mod: E4,E2

## 2024-01-29 PROCEDURE — 92250 FUNDUS PHOTOGRAPHY W/I&R: CPT

## 2024-01-29 PROCEDURE — 92014 COMPRE OPH EXAM EST PT 1/>: CPT | Mod: 25

## 2024-02-27 ENCOUNTER — APPOINTMENT (OUTPATIENT)
Dept: CARDIOLOGY | Facility: CLINIC | Age: 64
End: 2024-02-27
Payer: MEDICAID

## 2024-02-27 ENCOUNTER — APPOINTMENT (OUTPATIENT)
Dept: ELECTROPHYSIOLOGY | Facility: CLINIC | Age: 64
End: 2024-02-27
Payer: COMMERCIAL

## 2024-02-27 ENCOUNTER — NON-APPOINTMENT (OUTPATIENT)
Age: 64
End: 2024-02-27

## 2024-02-27 VITALS
SYSTOLIC BLOOD PRESSURE: 130 MMHG | WEIGHT: 122 LBS | HEART RATE: 80 BPM | BODY MASS INDEX: 23.95 KG/M2 | DIASTOLIC BLOOD PRESSURE: 80 MMHG | OXYGEN SATURATION: 96 % | HEIGHT: 60 IN

## 2024-02-27 PROCEDURE — 93000 ELECTROCARDIOGRAM COMPLETE: CPT

## 2024-02-27 PROCEDURE — 99214 OFFICE O/P EST MOD 30 MIN: CPT | Mod: 25

## 2024-02-27 NOTE — DISCUSSION/SUMMARY
[FreeTextEntry1] : The patient is a 63-year-old female Hodgkins s/p chemo/rad years ago, hypothyroid, htn, hyperlipidemia, ZAKI, pulmonary fibrosis, GERD with bradycardia and skipped heart beats on her home monitor.  #1 CV- normal ECG, echo and exercise stress test neg, event monitor APCs in past, event monitor today. #2 HTN- c/w HCTZ and metoprolol  #3 HLD- c/w atorvastatin #4 Hypothyroid- c/w levothyroxine #5 Pulm- ZAKI with nose strip, mild pulm fibrosis, f/u Dr. Reid #6 Onc- remote Hodgkins treated with chemo and radiation #7 GI- on omeprazole and most likely cause of symptoms, EGD [EKG obtained to assist in diagnosis and management of assessed problem(s)] : EKG obtained to assist in diagnosis and management of assessed problem(s)

## 2024-02-27 NOTE — HISTORY OF PRESENT ILLNESS
[FreeTextEntry1] : Vannesa never had completed EGD. GERD still bothering her. BP sometimes with skipped beats on monitor. Occasional elevated BP.

## 2024-03-18 PROCEDURE — 93244 EXT ECG>48HR<7D REV&INTERPJ: CPT

## 2024-04-01 ENCOUNTER — APPOINTMENT (OUTPATIENT)
Dept: GASTROENTEROLOGY | Facility: CLINIC | Age: 64
End: 2024-04-01
Payer: MEDICAID

## 2024-04-01 VITALS
WEIGHT: 122 LBS | RESPIRATION RATE: 16 BRPM | TEMPERATURE: 98.2 F | HEIGHT: 60 IN | OXYGEN SATURATION: 98 % | BODY MASS INDEX: 23.95 KG/M2 | SYSTOLIC BLOOD PRESSURE: 144 MMHG | DIASTOLIC BLOOD PRESSURE: 74 MMHG | HEART RATE: 94 BPM

## 2024-04-01 DIAGNOSIS — R20.8 OTHER DISTURBANCES OF SKIN SENSATION: ICD-10-CM

## 2024-04-01 DIAGNOSIS — R14.0 ABDOMINAL DISTENSION (GASEOUS): ICD-10-CM

## 2024-04-01 PROCEDURE — 99213 OFFICE O/P EST LOW 20 MIN: CPT

## 2024-04-01 RX ORDER — FAMOTIDINE 20 MG/1
20 TABLET, FILM COATED ORAL
Qty: 60 | Refills: 2 | Status: ACTIVE | COMMUNITY
Start: 2024-04-01 | End: 1900-01-01

## 2024-04-01 NOTE — PHYSICAL EXAM
[Normal Voice/Communication] : normal voice/communication [Alert] : alert [No Acute Distress] : no acute distress [Healthy Appearing] : healthy appearing [Sclera] : the sclera and conjunctiva were normal [Hearing Threshold Finger Rub Not [place]] : hearing was normal [Normal Lips/Gums] : the lips and gums were normal [Oropharynx] : the oropharynx was normal [Normal Appearance] : the appearance of the neck was normal [No Neck Mass] : no neck mass was observed [No Respiratory Distress] : no respiratory distress [No Acc Muscle Use] : no accessory muscle use [Respiration, Rhythm And Depth] : normal respiratory rhythm and effort [Auscultation Breath Sounds / Voice Sounds] : lungs were clear to auscultation bilaterally [Heart Rate And Rhythm] : heart rate was normal and rhythm regular [Normal S1, S2] : normal S1 and S2 [Murmurs] : no murmurs [Bowel Sounds] : normal bowel sounds [No Masses] : no abdominal mass palpated [Abdomen Tenderness] : non-tender [Abdomen Soft] : soft [] : no hepatosplenomegaly [Oriented To Time, Place, And Person] : oriented to person, place, and time

## 2024-04-01 NOTE — HISTORY OF PRESENT ILLNESS
[FreeTextEntry1] : JOSÉ HEATON 63 year fillipino F came for FU, she complains of heartburn and regurgitation for few years. Also reports having abdominal fullness, bloating and burping. Upper endoscopy was attempted last year but due to extreme hypotension and bradycardia procedure was aborted sooner She had abdominal sonogram 2 years ago which showed multiple small gallbladder polyps Has been taking Tums and Pepcid AC but has not resolved the symptoms. Denies any chest pain, cough,dysphagia or food impaction in the past. Patient typically eats non spicy food. No family Hx of Esophageal or gastric cancer. No hx of frequent dental disease in the past. Patient was seen by her cardiologist Dr. Susan bailey and had a Holter study done, subsequent follow-up is pending Denies use of NSAIDs ,ASA, excess consumption of alcohol and not a current smoker.

## 2024-04-01 NOTE — ASSESSMENT
[FreeTextEntry1] : 63-year-old lady with hypertension and's short runs of SVT came for evaluation and follow-up of her GERD symptoms. She has an upcoming appointment to see Dr. Chavis again. Will defer upper endoscopy for now and get abdominal sonogram I advised her to increase the dose of famotidine to 20 mg twice daily Tums as needed. Avoid spicy and fatty meals Follow-up as needed, will decide if EGD to be done

## 2024-04-01 NOTE — PHYSICAL EXAM
[Normal Voice/Communication] : normal voice/communication [Alert] : alert [No Acute Distress] : no acute distress [Healthy Appearing] : healthy appearing [Sclera] : the sclera and conjunctiva were normal [Hearing Threshold Finger Rub Not [place]] : hearing was normal [Normal Lips/Gums] : the lips and gums were normal [Oropharynx] : the oropharynx was normal [Normal Appearance] : the appearance of the neck was normal [No Neck Mass] : no neck mass was observed [No Respiratory Distress] : no respiratory distress [No Acc Muscle Use] : no accessory muscle use [Respiration, Rhythm And Depth] : normal respiratory rhythm and effort [Auscultation Breath Sounds / Voice Sounds] : lungs were clear to auscultation bilaterally [Heart Rate And Rhythm] : heart rate was normal and rhythm regular [Normal S1, S2] : normal S1 and S2 [Murmurs] : no murmurs [Bowel Sounds] : normal bowel sounds [Abdomen Tenderness] : non-tender [No Masses] : no abdominal mass palpated [Abdomen Soft] : soft [] : no hepatosplenomegaly [Oriented To Time, Place, And Person] : oriented to person, place, and time

## 2024-04-04 ENCOUNTER — APPOINTMENT (OUTPATIENT)
Dept: INTERNAL MEDICINE | Facility: CLINIC | Age: 64
End: 2024-04-04

## 2024-04-05 ENCOUNTER — APPOINTMENT (OUTPATIENT)
Dept: OPHTHALMOLOGY | Facility: CLINIC | Age: 64
End: 2024-04-05

## 2024-04-15 ENCOUNTER — APPOINTMENT (OUTPATIENT)
Dept: ULTRASOUND IMAGING | Facility: CLINIC | Age: 64
End: 2024-04-15
Payer: MEDICAID

## 2024-04-15 PROCEDURE — 76700 US EXAM ABDOM COMPLETE: CPT

## 2024-04-22 ENCOUNTER — NON-APPOINTMENT (OUTPATIENT)
Age: 64
End: 2024-04-22

## 2024-04-22 ENCOUNTER — APPOINTMENT (OUTPATIENT)
Dept: OPHTHALMOLOGY | Facility: CLINIC | Age: 64
End: 2024-04-22
Payer: MEDICAID

## 2024-04-22 PROCEDURE — 68761 CLOSE TEAR DUCT OPENING: CPT | Mod: E4,E2

## 2024-04-22 PROCEDURE — 92012 INTRM OPH EXAM EST PATIENT: CPT | Mod: 25

## 2024-05-07 ENCOUNTER — APPOINTMENT (OUTPATIENT)
Dept: GASTROENTEROLOGY | Facility: CLINIC | Age: 64
End: 2024-05-07

## 2024-05-14 ENCOUNTER — NON-APPOINTMENT (OUTPATIENT)
Age: 64
End: 2024-05-14

## 2024-05-14 ENCOUNTER — APPOINTMENT (OUTPATIENT)
Dept: CARDIOLOGY | Facility: CLINIC | Age: 64
End: 2024-05-14
Payer: MEDICAID

## 2024-05-14 ENCOUNTER — APPOINTMENT (OUTPATIENT)
Dept: ELECTROPHYSIOLOGY | Facility: CLINIC | Age: 64
End: 2024-05-14
Payer: MEDICAID

## 2024-05-14 VITALS
DIASTOLIC BLOOD PRESSURE: 81 MMHG | BODY MASS INDEX: 24.54 KG/M2 | HEART RATE: 65 BPM | SYSTOLIC BLOOD PRESSURE: 146 MMHG | WEIGHT: 125 LBS | OXYGEN SATURATION: 98 % | HEIGHT: 60 IN

## 2024-05-14 DIAGNOSIS — I51.89 OTHER ILL-DEFINED HEART DISEASES: ICD-10-CM

## 2024-05-14 DIAGNOSIS — I49.1 ATRIAL PREMATURE DEPOLARIZATION: ICD-10-CM

## 2024-05-14 DIAGNOSIS — K21.9 GASTRO-ESOPHAGEAL REFLUX DISEASE W/OUT ESOPHAGITIS: ICD-10-CM

## 2024-05-14 DIAGNOSIS — R00.1 BRADYCARDIA, UNSPECIFIED: ICD-10-CM

## 2024-05-14 DIAGNOSIS — R06.83 SNORING: ICD-10-CM

## 2024-05-14 PROCEDURE — 93000 ELECTROCARDIOGRAM COMPLETE: CPT

## 2024-05-14 PROCEDURE — 99214 OFFICE O/P EST MOD 30 MIN: CPT | Mod: 25

## 2024-05-14 PROCEDURE — 99204 OFFICE O/P NEW MOD 45 MIN: CPT

## 2024-05-14 PROCEDURE — G2211 COMPLEX E/M VISIT ADD ON: CPT | Mod: NC,1L

## 2024-05-14 NOTE — REVIEW OF SYSTEMS
[Lower Ext Edema] : no extremity edema [Leg Claudication] : no intermittent leg claudication [Palpitations] : no palpitations [Syncope] : no syncope

## 2024-05-14 NOTE — PHYSICAL EXAM
[Well Developed] : well developed [Well Nourished] : well nourished [No Acute Distress] : no acute distress [Normal Venous Pressure] : normal venous pressure [Normal S1, S2] : normal S1, S2 [No Murmur] : no murmur [No Rub] : no rub [No Gallop] : no gallop [Clear Lung Fields] : clear lung fields [Good Air Entry] : good air entry [No Respiratory Distress] : no respiratory distress  [Soft] : abdomen soft [Non Tender] : non-tender [Normal Gait] : normal gait [No Edema] : no edema [No Rash] : no rash [Moves all extremities] : moves all extremities [Alert and Oriented] : alert and oriented [Normal memory] : normal memory

## 2024-05-14 NOTE — DISCUSSION/SUMMARY
[FreeTextEntry1] : The patient is a 63-year-old female Hodgkins s/p chemo/rad years ago, hypothyroid, HTN, HLD, ZAKI, pulmonary fibrosis, GERD with bradycardia and skipped heart beats on her home monitor.  #1 CV- normal ECG, echo and exercise stress test neg, event monitor APCs in past, event monitor with symptomatic pauses. Refer to Dr. Buitrago today ? ablation ? PPM #2 HTN- c/w HCTZ and metoprolol  #3 HLD- c/w atorvastatin #4 Hypothyroid- c/w levothyroxine #5 Pulm- ZAKI with nose strip, mild pulm fibrosis, f/u Dr. Reid #6 Onc- remote Hodgkins treated with chemo and radiation #7 GI- consider restarting famotidine nightly. [EKG obtained to assist in diagnosis and management of assessed problem(s)] : EKG obtained to assist in diagnosis and management of assessed problem(s)

## 2024-05-14 NOTE — HISTORY OF PRESENT ILLNESS
[FreeTextEntry1] : Mrs. Vannesa House is a 63-year-old woman with Hodgkin's lymphoma (status post chemotherapy and radiation with radiation-induced pulmonary fibrosis), hypertension, hyperlipidemia, mild obstructive sleep apnea and gastrointestinal reflux disease. She presents today for an initial evaluation.  The patient has had palpitations for some time. She also endorses symptoms of intermittent dizziness. A couple of months ago, while standing in the kitchen, she felt that she may have "passed out." She became lightheaded and almost tripped but did not fall. She also gets intermittent palpitations and lightheadedness. She occasionally naps between 1-2 times per week in the afternoon. She works as a nanny. She is being treated with Metoprolol for hypertension per her report. When she measures her blood pressure at home, she notices that she occasionally obtains heart rate readings in the 40s-50s. She snores and was found to have mild sleep apnea on a home sleep study from January of 2023. She is not presently being treated for her sleep apnea. She received chemotherapy through a peripheral IV and did not have a Mediport.  A Zio monitor worn from February 27th, 2024 through March 3rd, 2024 demonstrated four sinus pauses (longest 3.7 seconds in duration). Her pauses occurred at 2:23pm, 1:35am, 5:40am. She reports that she occasionally naps in the afternoon. She does not recall if she was napping on the day she had a sinus pause in the afternoon. Her symptom triggered events correlated with sinus bradycardia in the 50s-60s with atrial premature contractions. Her overall burden of atrial premature contractions was 2.6%. She had two episodes of a non-sustained atrial tachycardia (6 beats at 164 beats per minute).  A prior Zio monitor worn in January of 2023 revealed sinus pauses during the evening hours (longest 13.6 seconds) along with a junctional rhythm (that correlated with a patient triggered event)  Vitals from this visit: BP: 146/81, HR: 65, RR: 18, SpO2: 98% on RA

## 2024-05-14 NOTE — CARDIOLOGY SUMMARY
[de-identified] : ECG from 5/14/2024: Sinus with APCs - 64bpm [de-identified] : Zio from 2/27/2024-3/3/2024: 4 sinus pauses (longest 3.7 seconds in duration), 2:23pm, 1:35am, 5:40am, symptom triggered events - sinus bradycardia in the 50s-60s with APCs, overall APCD burden 2.6%. 2 episodes of non-sustained AT (6 beats at 164 beats per minute).  Zio XT from 1/10/2023-1/15/2023 (personally reviewed): min HR: 30, max HR: 111, mean HR: 59, 46 pauses (longest 13.6 seconds - 3am, second longest paus 4.6s 3:09am, third longest 4.1s 3:23am), APCs 6.1%. Patient triggered events were for sinus rhythm with APCs (50s-60s) and a junctional rhythm at 44bpm (11:14am) [de-identified] : TTE from 7/27/2021: EF: 57%, normal LA and RA, normal RV size and systolic function, no pericardial effusion, mild-mod AI, mild TR [de-identified] : Home sleep study from 1/24/2023: pAHI 5 events/hour, lena SpO2 83%, 0.8% of the time beloe 90%

## 2024-05-14 NOTE — DISCUSSION/SUMMARY
[FreeTextEntry1] : Mrs. Vannesa House is a 63-year-old woman with Hodgkin's lymphoma (status post chemotherapy and radiation with radiation-induced pulmonary fibrosis), hypertension, hyperlipidemia, mild obstructive sleep apnea and gastrointestinal reflux disease. She presents today for an initial evaluation. She has evidence of sinus node dysfunction with symptoms. I suspect her sinus pauses at night are due to untreated sleep apnea. The fact that she occasionally naps during the day makes her daytime pauses difficult to interpret. Her prior monitors have demonstrated that her symptom triggered events correlated with sinus bradycardia.   We discuss the following options 1) implantation of a pacemaker to treat sinus node dysfunction and 2) discontinuation of Metoprolol and a reassessment of her symptoms off Metoprolol and following treatment for mild obstructive sleep apnea. Given her young age I told her I would favor the later option. We discussed that her palpitation may become worse off Metoprolol. I suggested that she call the office if this occurs so I could send her another monitor. If she has a high burden of atrial arrhythmias off Metoprolol, we could discuss the possibility of a catheter ablation (with concomitant ganglionated plexus ablation to treat sinus node dysfunction). I provided her with the number for sleep medicine to discuss treatment of her mild obstructive sleep apnea given her nocturnal pauses.

## 2024-05-14 NOTE — REVIEW OF SYSTEMS
[Feeling Fatigued] : feeling fatigued [SOB] : no shortness of breath [Chest Discomfort] : chest discomfort [Palpitations] : palpitations [Dizziness] : dizziness [Negative] : Heme/Lymph

## 2024-05-14 NOTE — HISTORY OF PRESENT ILLNESS
[FreeTextEntry1] : Vannesa was given famotidine bid for 30 days and now just TUMS. Palpitations got better on famotidine. Now having lightheadedness and some anxiety. Symptoms are in the evening. Lightheadedness daytime. Chest tightness in evening. Near syncope too.

## 2024-05-21 ENCOUNTER — RX RENEWAL (OUTPATIENT)
Age: 64
End: 2024-05-21

## 2024-05-22 ENCOUNTER — RX RENEWAL (OUTPATIENT)
Age: 64
End: 2024-05-22

## 2024-05-22 RX ORDER — HYDROCHLOROTHIAZIDE 25 MG/1
25 TABLET ORAL
Qty: 90 | Refills: 3 | Status: ACTIVE | COMMUNITY
Start: 2021-06-28 | End: 1900-01-01

## 2024-06-06 ENCOUNTER — RX RENEWAL (OUTPATIENT)
Age: 64
End: 2024-06-06

## 2024-06-10 ENCOUNTER — APPOINTMENT (OUTPATIENT)
Dept: INTERNAL MEDICINE | Facility: CLINIC | Age: 64
End: 2024-06-10
Payer: MEDICAID

## 2024-06-10 VITALS
BODY MASS INDEX: 23.56 KG/M2 | DIASTOLIC BLOOD PRESSURE: 70 MMHG | OXYGEN SATURATION: 98 % | SYSTOLIC BLOOD PRESSURE: 140 MMHG | WEIGHT: 120 LBS | HEIGHT: 60 IN | HEART RATE: 91 BPM

## 2024-06-10 VITALS — SYSTOLIC BLOOD PRESSURE: 126 MMHG | DIASTOLIC BLOOD PRESSURE: 70 MMHG

## 2024-06-10 DIAGNOSIS — R73.09 OTHER ABNORMAL GLUCOSE: ICD-10-CM

## 2024-06-10 DIAGNOSIS — K21.9 GASTRO-ESOPHAGEAL REFLUX DISEASE W/OUT ESOPHAGITIS: ICD-10-CM

## 2024-06-10 DIAGNOSIS — E03.8 OTHER SPECIFIED HYPOTHYROIDISM: ICD-10-CM

## 2024-06-10 DIAGNOSIS — G47.33 OBSTRUCTIVE SLEEP APNEA (ADULT) (PEDIATRIC): ICD-10-CM

## 2024-06-10 DIAGNOSIS — E78.5 HYPERLIPIDEMIA, UNSPECIFIED: ICD-10-CM

## 2024-06-10 DIAGNOSIS — J84.10 PULMONARY FIBROSIS, UNSPECIFIED: ICD-10-CM

## 2024-06-10 DIAGNOSIS — M85.80 OTHER SPECIFIED DISORDERS OF BONE DENSITY AND STRUCTURE, UNSPECIFIED SITE: ICD-10-CM

## 2024-06-10 LAB
25(OH)D3 SERPL-MCNC: 44.6 NG/ML
ALBUMIN SERPL ELPH-MCNC: 4.9 G/DL
ALP BLD-CCNC: 78 U/L
ALT SERPL-CCNC: 18 U/L
ANION GAP SERPL CALC-SCNC: 14 MMOL/L
AST SERPL-CCNC: 27 U/L
BILIRUB SERPL-MCNC: 0.6 MG/DL
BUN SERPL-MCNC: 13 MG/DL
CALCIUM SERPL-MCNC: 10.2 MG/DL
CHLORIDE SERPL-SCNC: 98 MMOL/L
CHOLEST SERPL-MCNC: 203 MG/DL
CO2 SERPL-SCNC: 27 MMOL/L
CREAT SERPL-MCNC: 0.56 MG/DL
EGFR: 102 ML/MIN/1.73M2
ESTIMATED AVERAGE GLUCOSE: 128 MG/DL
GLUCOSE SERPL-MCNC: 102 MG/DL
HBA1C MFR BLD HPLC: 6.1 %
HDLC SERPL-MCNC: 92 MG/DL
LDLC SERPL CALC-MCNC: 98 MG/DL
NONHDLC SERPL-MCNC: 111 MG/DL
POTASSIUM SERPL-SCNC: 4 MMOL/L
PROT SERPL-MCNC: 7.9 G/DL
SODIUM SERPL-SCNC: 139 MMOL/L
TRIGL SERPL-MCNC: 77 MG/DL
TSH SERPL-ACNC: 1.76 UIU/ML

## 2024-06-10 PROCEDURE — 99396 PREV VISIT EST AGE 40-64: CPT

## 2024-06-10 RX ORDER — OMEPRAZOLE 20 MG/1
20 CAPSULE, DELAYED RELEASE ORAL
Qty: 90 | Refills: 0 | Status: DISCONTINUED | COMMUNITY
Start: 2022-05-24 | End: 2024-06-10

## 2024-06-10 RX ORDER — DIAZEPAM 5 MG/1
5 TABLET ORAL
Qty: 10 | Refills: 0 | Status: ACTIVE | COMMUNITY
Start: 2023-06-05 | End: 1900-01-01

## 2024-06-10 NOTE — REVIEW OF SYSTEMS
[Dryness] : dryness  [Palpitations] : palpitations [Cough] : cough [Heartburn] : heartburn [Headache] : headache [Negative] : Musculoskeletal [Fever] : no fever [Chills] : no chills [Fatigue] : no fatigue [Recent Change In Weight] : ~T no recent weight change [Chest Pain] : no chest pain [Shortness Of Breath] : no shortness of breath [Dyspnea on Exertion] : no dyspnea on exertion [Abdominal Pain] : no abdominal pain [Nausea] : no nausea [Constipation] : no constipation [Diarrhea] : diarrhea [Vomiting] : no vomiting [Melena] : no melena [Dysuria] : no dysuria [Vaginal Discharge] : no vaginal discharge [Skin Rash] : no skin rash [Dizziness] : no dizziness [Fainting] : no fainting [Anxiety] : no anxiety [Depression] : no depression [Easy Bleeding] : no easy bleeding [Easy Bruising] : no easy bruising [FreeTextEntry3] : will see optho, had cataract, dry eyes [FreeTextEntry6] : will wake up at night feeling gasping for air, no chest tightness, will have SOB after 3 flights of stairs (had nondiagnostic stress test 7/2021; mild cough ?from allergies lately [FreeTextEntry8] : vaginal dryness [FreeTextEntry9] : shoulder pain, some back pain if on feet all day [de-identified] : HA past 2 days, drank water

## 2024-06-10 NOTE — PHYSICAL EXAM
[No Acute Distress] : no acute distress [Well Nourished] : well nourished [Well Developed] : well developed [Well-Appearing] : well-appearing [PERRL] : pupils equal round and reactive to light [EOMI] : extraocular movements intact [Normal Oropharynx] : the oropharynx was normal [Normal TMs] : both tympanic membranes were normal [No Lymphadenopathy] : no lymphadenopathy [Supple] : supple [Thyroid Normal, No Nodules] : the thyroid was normal and there were no nodules present [No Respiratory Distress] : no respiratory distress  [No Accessory Muscle Use] : no accessory muscle use [Clear to Auscultation] : lungs were clear to auscultation bilaterally [Normal Rate] : normal rate  [Regular Rhythm] : with a regular rhythm [Normal S1, S2] : normal S1 and S2 [No Murmur] : no murmur heard [No Carotid Bruits] : no carotid bruits [Pedal Pulses Present] : the pedal pulses are present [No Edema] : there was no peripheral edema [Normal Appearance] : normal in appearance [No Nipple Discharge] : no nipple discharge [No Axillary Lymphadenopathy] : no axillary lymphadenopathy [Soft] : abdomen soft [Non Tender] : non-tender [Non-distended] : non-distended [No Masses] : no abdominal mass palpated [No HSM] : no HSM [Normal Bowel Sounds] : normal bowel sounds [Normal Supraclavicular Nodes] : no supraclavicular lymphadenopathy [Normal Axillary Nodes] : no axillary lymphadenopathy [Normal Posterior Cervical Nodes] : no posterior cervical lymphadenopathy [Normal Anterior Cervical Nodes] : no anterior cervical lymphadenopathy [Normal Inguinal Nodes] : no inguinal lymphadenopathy [No Joint Swelling] : no joint swelling [No Rash] : no rash [Normal Gait] : normal gait [Normal Affect] : the affect was normal [de-identified] : scar upper chest [de-identified] : scar lower abdomen

## 2024-06-10 NOTE — HEALTH RISK ASSESSMENT
[Patient reported mammogram was normal] : Patient reported mammogram was normal [Patient reported PAP Smear was normal] : Patient reported PAP Smear was normal [Patient reported bone density results were abnormal] : Patient reported bone density results were abnormal [Patient reported colonoscopy was normal] : Patient reported colonoscopy was normal [No] : In the past 12 months have you used drugs other than those required for medical reasons? 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

## 2024-06-10 NOTE — ASSESSMENT
[FreeTextEntry1] : 64 yo female with h/o as above including HTN, hyperlipidemia, hypothyroidism, GERD, preDM, osteopenia, Hodgkin's lymphoma s/p chemo/radiation, subsequent pulmonary fibrosis, ZAKI, gallbladder polyp, here for CPE. 1.  CV - bp at goal even off metoprolol with resolution of dizziness but now with some recurrent palpitations so advised to f/up with EP Dr. Buitrago to see if needs zio patch, check bmp on hctz, check lipids on statin; to discuss QUINONES with cardiology (especially if persists after regular exertion) 2.  Pulm - advised to f/up with pulm and for PFTs as due (and for QUINONES as well); also awaiting sleep medicine eval in case was contributing to pauses 3.  GI - due for colonoscopy, will f/up with gi, c/w pepcid as providing symptomatic relief; abdominal us utd to f/up gallbladder polyp 4.  Gyn - pap utd, mammo utd 5. Endo - rx dexa given as due; check vitamin D; check a1c for preDM, tsh on synthroid 6.  Psych - uses valium sparingly for anxiety, istop checked 7.  HCM - check below labs; consider shingrix, other vaccines utd 8.  RTO 6 months f/up visit

## 2024-06-10 NOTE — HISTORY OF PRESENT ILLNESS
[FreeTextEntry1] : physical [de-identified] : 62 yo female with h/o as below here for CPE. Since last visit, had bad GERD, saw GI Dr. Pinzon, had US abdomen 4/24 showing stable gallbladder polyps.  In January 2023 had EGD but had hypotension during procedure so couldn't complete it, but said didn't find anything abnl, but GERD was still bad.   Has been having chest pain, saw cardiologist and said was related to GERD.  Taking famotidine, lately GERD is better (prior to that was on PPI but didn't help and would have abdominal pain with po intake).  Now better on famotidine.   Also on holter this year found to have SVT and pauses, went to cardiology and sent to EP, told to stop Metoprolol and monitor for recurrent palpitations.  Prior to stopping metoprolol, was having episodes of lightheadedness daily, not sure if related to GERD or the heart.  Was also having moments of chest tightness at night before bed, palpitations at night, skipped beats.  Had some HR 40s on metoprolol.  After stopping metoprolol, no further dizziness/lightheadedness, once felt skipped beats, but now having palpitations again.  HR 80s-90s, bp 110s-130s, few 140s/150s (but rare, mostly [id number]).     Was also told to f/up with sleep medicine for ZAKI in case contributing to pauses at night, has appt. Saw pulmonary for h/o pulmonary fibrosis, was told to have PFTs done but didn't do yet. Needs rx dexa, mammo.   Will see cancer survivorship Dr. Sharon Gurrola, has appt. No other active issues.

## 2024-06-11 LAB
BASOPHILS # BLD AUTO: 0.05 K/UL
BASOPHILS NFR BLD AUTO: 1 %
EOSINOPHIL # BLD AUTO: 0.13 K/UL
EOSINOPHIL NFR BLD AUTO: 2.6 %
HCT VFR BLD CALC: 42.2 %
HGB BLD-MCNC: 13.7 G/DL
IMM GRANULOCYTES NFR BLD AUTO: 0.2 %
LYMPHOCYTES # BLD AUTO: 1.66 K/UL
LYMPHOCYTES NFR BLD AUTO: 32.9 %
MAN DIFF?: NORMAL
MCHC RBC-ENTMCNC: 30 PG
MCHC RBC-ENTMCNC: 32.5 GM/DL
MCV RBC AUTO: 92.5 FL
MONOCYTES # BLD AUTO: 0.44 K/UL
MONOCYTES NFR BLD AUTO: 8.7 %
NEUTROPHILS # BLD AUTO: 2.75 K/UL
NEUTROPHILS NFR BLD AUTO: 54.6 %
PLATELET # BLD AUTO: 205 K/UL
RBC # BLD: 4.56 M/UL
RBC # FLD: 13.2 %
WBC # FLD AUTO: 5.04 K/UL

## 2024-06-12 ENCOUNTER — OUTPATIENT (OUTPATIENT)
Dept: OUTPATIENT SERVICES | Facility: HOSPITAL | Age: 64
LOS: 1 days | End: 2024-06-12
Payer: MEDICAID

## 2024-06-12 ENCOUNTER — APPOINTMENT (OUTPATIENT)
Dept: RADIOLOGY | Facility: IMAGING CENTER | Age: 64
End: 2024-06-12
Payer: MEDICAID

## 2024-06-12 DIAGNOSIS — M85.80 OTHER SPECIFIED DISORDERS OF BONE DENSITY AND STRUCTURE, UNSPECIFIED SITE: ICD-10-CM

## 2024-06-12 DIAGNOSIS — Z98.49 CATARACT EXTRACTION STATUS, UNSPECIFIED EYE: Chronic | ICD-10-CM

## 2024-06-12 DIAGNOSIS — Z98.891 HISTORY OF UTERINE SCAR FROM PREVIOUS SURGERY: Chronic | ICD-10-CM

## 2024-06-12 DIAGNOSIS — Z98.890 OTHER SPECIFIED POSTPROCEDURAL STATES: Chronic | ICD-10-CM

## 2024-06-12 PROCEDURE — 77085 DXA BONE DENSITY AXL VRT FX: CPT

## 2024-06-12 PROCEDURE — 77085 DXA BONE DENSITY AXL VRT FX: CPT | Mod: 26

## 2024-06-13 ENCOUNTER — APPOINTMENT (OUTPATIENT)
Dept: ELECTROPHYSIOLOGY | Facility: CLINIC | Age: 64
End: 2024-06-13

## 2024-06-21 ENCOUNTER — APPOINTMENT (OUTPATIENT)
Dept: INTERNAL MEDICINE | Facility: CLINIC | Age: 64
End: 2024-06-21
Payer: MEDICAID

## 2024-06-21 ENCOUNTER — APPOINTMENT (OUTPATIENT)
Dept: HEMATOLOGY ONCOLOGY | Facility: CLINIC | Age: 64
End: 2024-06-21

## 2024-06-21 VITALS
OXYGEN SATURATION: 98 % | DIASTOLIC BLOOD PRESSURE: 79 MMHG | HEART RATE: 110 BPM | WEIGHT: 121.23 LBS | SYSTOLIC BLOOD PRESSURE: 153 MMHG | TEMPERATURE: 97.9 F | BODY MASS INDEX: 23.8 KG/M2 | HEIGHT: 59.84 IN | RESPIRATION RATE: 16 BRPM

## 2024-06-21 DIAGNOSIS — R06.02 SHORTNESS OF BREATH: ICD-10-CM

## 2024-06-21 DIAGNOSIS — Z92.3 PERSONAL HISTORY OF IRRADIATION: ICD-10-CM

## 2024-06-21 DIAGNOSIS — R00.2 PALPITATIONS: ICD-10-CM

## 2024-06-21 DIAGNOSIS — I10 ESSENTIAL (PRIMARY) HYPERTENSION: ICD-10-CM

## 2024-06-21 DIAGNOSIS — C81.90 HODGKIN LYMPHOMA, UNSPECIFIED, UNSPECIFIED SITE: ICD-10-CM

## 2024-06-21 PROCEDURE — 99215 OFFICE O/P EST HI 40 MIN: CPT

## 2024-06-21 RX ORDER — ATORVASTATIN CALCIUM 40 MG/1
40 TABLET, FILM COATED ORAL
Qty: 90 | Refills: 3 | Status: ACTIVE | COMMUNITY
Start: 2019-05-24 | End: 1900-01-01

## 2024-06-21 NOTE — HISTORY OF PRESENT ILLNESS
[FreeTextEntry1] : Overall feeling well. Does describe some shortness of breath when she climbs stairs which is somewhat new and is currently being worked up with both an electrophysiologist and a cardiologist.  Also is being evaluated for irregular cardiac rhythms.  Does not have any difficulties walking on a flat road/surface and can do this without any dyspnea.   Has noticed her blood pressure has been variable as well.  While she was on metoprolol, she would notice systolic BPs between 140-150, normal diastolic pressure and HR within 70-80 range.  She was feeling light headed at times, so her electrophysiologist recommended that she stop metoprolol.  Once she stopped metoprolol, her HR became elevated again (90-100s) and her BP improved (systolic 110-120).  Light-headedness resolved.  She was supposed to have an endoscopy and part of the procedure was canceled because she developed hypotension during the procedure.  She has found that famotidine helped her reflux symptoms much better than omeprazole.   FHx: Father -- emphysema/COPD,  at age 54 ?? cancer she thinks it was lung cancer in the 1980s.  PCousin -- brain cancer PUncle -- pancreatic cancer Mother - currently has lung cancer -- diagnosed at age 85.  Brother -- diagnosed at age 61 with Parkinson's disease  PMHx: HTN Arrythmia NOS  MEDS: levothyroxine atorvastatin HCTZ metoprolol temporarily held Calcium + D Vitsmin C Turmeric   PSurgHx: 4 X  (oldest child is 38, youngest child is 26)  Gyn history: LMP: age 45 Menarche: age 9 Continues to see Gynecology regularly  Due for colonoscopy -- will schedule with GI.

## 2024-06-21 NOTE — PHYSICAL EXAM
[No Acute Distress] : no acute distress [Well Nourished] : well nourished [Well Developed] : well developed [No JVD] : no jugular venous distention [No Lymphadenopathy] : no lymphadenopathy [No Respiratory Distress] : no respiratory distress  [No Accessory Muscle Use] : no accessory muscle use [Clear to Auscultation] : lungs were clear to auscultation bilaterally [Normal Rate] : normal rate  [Regular Rhythm] : with a regular rhythm [No Edema] : there was no peripheral edema [Soft] : abdomen soft [Non Tender] : non-tender [Non-distended] : non-distended [No Masses] : no abdominal mass palpated [No HSM] : no HSM [Normal Bowel Sounds] : normal bowel sounds [Normal Supraclavicular Nodes] : no supraclavicular lymphadenopathy [Normal Axillary Nodes] : no axillary lymphadenopathy [Normal Posterior Cervical Nodes] : no posterior cervical lymphadenopathy [Normal Anterior Cervical Nodes] : no anterior cervical lymphadenopathy [Normal Femoral Nodes] : no femoral lymphadenopathy [No CVA Tenderness] : no CVA  tenderness [No Spinal Tenderness] : no spinal tenderness [No Rash] : no rash [Coordination Grossly Intact] : coordination grossly intact [Normal Gait] : normal gait [de-identified] : occasional course breath sounds.

## 2024-06-21 NOTE — DISCUSSION/SUMMARY
[Cancer Type / Location / Histology Subtype: ________] : Cancer Type / Location / Histology Subtype: [unfilled] [Diagnosis Date (year): ____] : Diagnosis Date (year): [unfilled] [FreeTextEntry1] : Diagnosed in 2000 with Hodgkins Lymphoma. At that time she was having fevers/night sweats, and fevers.  CXR showed mediastinal lymphadenopathy. Treated with chemotherapy -- recalls getting 7 mohths of treatment.  Also had 1 month of mediastinal radiation.

## 2024-06-22 LAB
APPEARANCE: CLEAR
BACTERIA: NEGATIVE /HPF
BILIRUBIN URINE: NEGATIVE
BLOOD URINE: NEGATIVE
CAST: 0 /LPF
COLOR: YELLOW
EPITHELIAL CELLS: 0 /HPF
GLUCOSE QUALITATIVE U: NEGATIVE MG/DL
KETONES URINE: NEGATIVE MG/DL
LDH SERPL-CCNC: 237 U/L
LEUKOCYTE ESTERASE URINE: NEGATIVE
MICROSCOPIC-UA: NORMAL
NITRITE URINE: NEGATIVE
PH URINE: 7.5
PROTEIN URINE: NEGATIVE MG/DL
RED BLOOD CELLS URINE: 2 /HPF
SPECIFIC GRAVITY URINE: 1.01
UROBILINOGEN URINE: 0.2 MG/DL
WHITE BLOOD CELLS URINE: 2 /HPF

## 2024-06-24 ENCOUNTER — APPOINTMENT (OUTPATIENT)
Dept: OPHTHALMOLOGY | Facility: CLINIC | Age: 64
End: 2024-06-24

## 2024-07-05 ENCOUNTER — RX RENEWAL (OUTPATIENT)
Age: 64
End: 2024-07-05

## 2024-07-17 PROCEDURE — 93248 EXT ECG>7D<15D REV&INTERPJ: CPT

## 2024-08-22 PROBLEM — M81.0 AGE RELATED OSTEOPOROSIS, UNSPECIFIED PATHOLOGICAL FRACTURE PRESENCE: Status: ACTIVE | Noted: 2024-08-22

## 2024-08-26 ENCOUNTER — APPOINTMENT (OUTPATIENT)
Dept: INTERNAL MEDICINE | Facility: CLINIC | Age: 64
End: 2024-08-26
Payer: MEDICAID

## 2024-08-26 DIAGNOSIS — Z92.3 PERSONAL HISTORY OF IRRADIATION: ICD-10-CM

## 2024-08-26 DIAGNOSIS — K21.9 GASTRO-ESOPHAGEAL REFLUX DISEASE W/OUT ESOPHAGITIS: ICD-10-CM

## 2024-08-26 PROCEDURE — 99213 OFFICE O/P EST LOW 20 MIN: CPT | Mod: 95

## 2024-08-26 NOTE — HISTORY OF PRESENT ILLNESS
[Home] : at home, [unfilled] , at the time of the visit. [Medical Office: ([facility])___] : at the medical office located in  [Verbal consent obtained from patient] : the patient, [unfilled] [FreeTextEntry1] : Follow up regarding cardiovascular work up [de-identified] : Wore a heart monitor for 10 days.  Was told she has some irregular beats on occasion, but not significant.  She was relieved to hear that she probably does not need a pacemaker.  Will see Cardiology in follow up in early September.  She was also told to stop her metoprolol because of bradycardia.   Now, she is only on HCTZ for her blood pressure.  She has noticed that her blood pressure has been slowly going up.  She has noticed BP readings between 138-164/75-85. HR at home between 90-95  She was started on Fosamax by her PCP to treat her osteoporosis.   Has noticed that her GERD has improved.  Will be having another sleep study and another set of pulmonary function tests. Her  has noticed that she is really snoring and having more apneic episodes at home.

## 2024-08-30 ENCOUNTER — RX RENEWAL (OUTPATIENT)
Age: 64
End: 2024-08-30

## 2024-09-09 ENCOUNTER — APPOINTMENT (OUTPATIENT)
Dept: PULMONOLOGY | Facility: CLINIC | Age: 64
End: 2024-09-09
Payer: COMMERCIAL

## 2024-09-09 VITALS
BODY MASS INDEX: 22.97 KG/M2 | OXYGEN SATURATION: 99 % | DIASTOLIC BLOOD PRESSURE: 69 MMHG | HEART RATE: 89 BPM | SYSTOLIC BLOOD PRESSURE: 134 MMHG | WEIGHT: 117 LBS | HEIGHT: 59.8 IN

## 2024-09-09 PROCEDURE — 94010 BREATHING CAPACITY TEST: CPT

## 2024-09-09 PROCEDURE — 99214 OFFICE O/P EST MOD 30 MIN: CPT | Mod: 25

## 2024-09-09 PROCEDURE — 94729 DIFFUSING CAPACITY: CPT

## 2024-09-09 PROCEDURE — ZZZZZ: CPT

## 2024-09-09 PROCEDURE — 94726 PLETHYSMOGRAPHY LUNG VOLUMES: CPT

## 2024-09-09 NOTE — ASSESSMENT
[FreeTextEntry1] : 60 year-old F with PMH GERD, diastolic dysfunction, Hodgkin's lymphoma s/p RT and chemotherapy, HTN, ZAKI on OAT, osteopenia, and radiation fibrosis who presents to the clinic for shortness of breath.  Dyspnea on exertion Radiation fibrosis  - PFT 8/2021 normal  - CT chest 2019: small area R apical scarring - discuss with GI re: GERD treatment as this can contribute to SOB. She is on Tums prn which she is taking 2x daily. Consider famotidine - repeat PFT to monitor pulmonary function before next visit   HCM - flu vaccine: received    RTC after PFT (2-3 months)  9/9/24: PFTs normal. no evidence for any progression or clinical signicance to mild apical radiation changes. Dyspnea at night may be secondary to zaki or GERD. Sleep Apnea : Patient's snoring and nocturnal dyspnea suggest she may have sleep apnea, although past sleep studies indicate it is mild. The patient has an upcoming appointment with Dr. Mcdaniel for further discussion and management options, including the use of CPAP or an equivalent appliance. - Therapeutic Interventions: Discuss use of CPAP or oral appliance with specialist.  - Diagnostic Tests: Assess effectiveness of treatment options on sleep apnea symptoms.  - Referrals: Visit with Dr. Mcdaniel  - Patient Education: Discuss condition with specialist to understand the benefits and detriments of different treatment options.  - Follow-Up: Return in six months or if symptoms worsen or do not improve with treatment. If the status of patient is improved after meeting with Dr. Mcdaniel and treatment, then the follow-up can be cancelled.

## 2024-09-09 NOTE — HISTORY OF PRESENT ILLNESS
[Never] : never [TextBox_4] : 60 year-old F with PMH GERD, diastolic dysfunction, Hodgkin's lymphoma s/p RT and chemotherapy, HTN, ZAKI on OAT, osteopenia, and pulmonary fibrosis who presents to the clinic for re-establishment of care.   She was last seen in 2021. At that time, she reported QUINONES over many years. Also reports cough that is triggered with exertion. She had a stress test performed which was terminated due to a low exercise tolerance.   She now presents after having covid 1 month prior. Symptoms were fever, chills, cough, shortness of breath. She was prescribed Paxlovid.  Exercise tolerance is now unlimited on flat ground. When she walks uphill she is limited by dyspnea but can walk for 10 minutes without stopping to catch her breath. She exercises daily in the summer.   Regarding GERD, she saw Dr. Silva. Underwent EGD that showed nonspecific gastritis, otherwise normal. She is taking Tums prn which provides relief. She was previously on PPI.   9/9/24: Patient Ms. Vannesa Tellez, who has a history of radiation and fibrosis, presented with ongoing complaints of waking up in the night gasping for breath and difficulty with stairs. - Chief Complaint (CC) : Patient reports waking up in the night gasping for breath, and having breathing difficulty upon climbing stairs. - History of Present Illness : Ms. Tellez, a 61-year-old female with a past medical history of radiation and fibrosis, presents with shortness of breath upon exertion and nocturnal dyspnea. She also reports an occasional elevated heart rate and has a history of mild sleep apnea. She indicates that the use of Famotidine has reduced her episodes of acid reflux but if she consumes food high in acid, she experiences discomfort. Reports of occasional snoring have also been noted by her . - Past Medical History : Patient has a past medical history significant for radiation therapy, fibrotic changes in the lungs, and gastroesophageal reflux disease. She also has a history of mild sleep apnea as diagnosed through sleep studies and a pre-diabetic status. Recently, patient has been taken off Metroprolol due to concerns of related elevated heart rate.

## 2024-09-09 NOTE — REVIEW OF SYSTEMS
[Cough] : cough [SOB on Exertion] : sob on exertion [GERD] : gerd [Fever] : no fever [Chills] : no chills [Sinus Problems] : no sinus problems [Hemoptysis] : no hemoptysis [Sputum] : no sputum [Dyspnea] : no dyspnea [Pleuritic Pain] : no pleuritic pain [Chest Discomfort] : no chest discomfort [Nasal Discharge] : no nasal discharge

## 2024-09-10 ENCOUNTER — APPOINTMENT (OUTPATIENT)
Dept: CARDIOLOGY | Facility: CLINIC | Age: 64
End: 2024-09-10
Payer: COMMERCIAL

## 2024-09-10 ENCOUNTER — NON-APPOINTMENT (OUTPATIENT)
Age: 64
End: 2024-09-10

## 2024-09-10 VITALS
BODY MASS INDEX: 23.36 KG/M2 | SYSTOLIC BLOOD PRESSURE: 138 MMHG | DIASTOLIC BLOOD PRESSURE: 78 MMHG | HEART RATE: 82 BPM | WEIGHT: 119 LBS | HEIGHT: 59.8 IN | DIASTOLIC BLOOD PRESSURE: 84 MMHG | OXYGEN SATURATION: 99 % | SYSTOLIC BLOOD PRESSURE: 130 MMHG

## 2024-09-10 DIAGNOSIS — J47.9 BRONCHIECTASIS, UNCOMPLICATED: ICD-10-CM

## 2024-09-10 DIAGNOSIS — I49.1 ATRIAL PREMATURE DEPOLARIZATION: ICD-10-CM

## 2024-09-10 DIAGNOSIS — R00.1 BRADYCARDIA, UNSPECIFIED: ICD-10-CM

## 2024-09-10 DIAGNOSIS — K21.9 GASTRO-ESOPHAGEAL REFLUX DISEASE W/OUT ESOPHAGITIS: ICD-10-CM

## 2024-09-10 PROCEDURE — G2211 COMPLEX E/M VISIT ADD ON: CPT

## 2024-09-10 PROCEDURE — 93000 ELECTROCARDIOGRAM COMPLETE: CPT

## 2024-09-10 PROCEDURE — 99214 OFFICE O/P EST MOD 30 MIN: CPT

## 2024-09-10 RX ORDER — PROPRANOLOL HYDROCHLORIDE 10 MG/1
10 TABLET ORAL TWICE DAILY
Qty: 180 | Refills: 3 | Status: ACTIVE | COMMUNITY
Start: 2024-09-10 | End: 1900-01-01

## 2024-09-10 NOTE — HISTORY OF PRESENT ILLNESS
[FreeTextEntry1] : Vannesa continues to feel some palpitations when she lays down at night. Occasionally during the day. 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 Wife had a hard time adjusting and picked up the family moving to [place]. She was going to leave but the mother asked her to stay for at least 5 months was the two daughters were having a difficult time adjusting. Now here for all her doctor appt and to see her own family. Had nl PFT yesterday. Seeing Dr. Mcdaniel tomorrow about the ZAKI. Morning BP frequently in the 130s. DBP is always ok.

## 2024-09-10 NOTE — DISCUSSION/SUMMARY
[FreeTextEntry1] : The patient is a 63-year-old female Hodgkins s/p chemo/rad years ago, hypothyroid, HTN, HLD, ZAKI, pulmonary fibrosis, GERD with bradycardia and APCs. #1 CV- normal ECG, echo and exercise stress test neg, event monitor APCs in past, event monitor with symptomatic pauses that normalized off metoprolol. Trial propanolol 10mg at night. f/u Dr. Buitrago #2 HTN- c/w HCTZ  #3 HLD- c/w atorvastatin #4 Hypothyroid- c/w levothyroxine #5 Pulm- ZAKI with nose strip, mild pulm fibrosis, f/u Dr. Dennis and Barron #6 Onc- remote Hodgkins treated with chemo and radiation #7 GI- c/w famotidine nightly. #8 General- emotional support provided for her position as .  [EKG obtained to assist in diagnosis and management of assessed problem(s)] : EKG obtained to assist in diagnosis and management of assessed problem(s)

## 2024-09-10 NOTE — HISTORY OF PRESENT ILLNESS
[FreeTextEntry1] : Vannesa continues to feel some palpitations when she lays down at night. Occasionally during the day. 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 Wife had a hard time adjusting and picked up the family moving to [place]. She was going to leave but the mother asked her to stay for at least 5 months was the two daughters were having a difficult time adjusting. Now here for all her doctor appt and to see her own family. Had nl PFT yesterday. Seeing Dr. Mcdaniel tomorrow about the ZAKI. Morning BP frequently in the 130s. DBP is always ok.

## 2024-09-11 ENCOUNTER — APPOINTMENT (OUTPATIENT)
Dept: PULMONOLOGY | Facility: CLINIC | Age: 64
End: 2024-09-11
Payer: COMMERCIAL

## 2024-09-11 VITALS
BODY MASS INDEX: 23.99 KG/M2 | DIASTOLIC BLOOD PRESSURE: 79 MMHG | SYSTOLIC BLOOD PRESSURE: 131 MMHG | HEART RATE: 90 BPM | WEIGHT: 119 LBS | OXYGEN SATURATION: 97 % | HEIGHT: 59 IN | TEMPERATURE: 97.5 F | RESPIRATION RATE: 16 BRPM

## 2024-09-11 DIAGNOSIS — G47.33 OBSTRUCTIVE SLEEP APNEA (ADULT) (PEDIATRIC): ICD-10-CM

## 2024-09-11 DIAGNOSIS — R06.83 SNORING: ICD-10-CM

## 2024-09-11 PROCEDURE — 99214 OFFICE O/P EST MOD 30 MIN: CPT

## 2024-09-11 NOTE — HISTORY OF PRESENT ILLNESS
[Obstructive Sleep Apnea] : obstructive sleep apnea [Snoring] : snoring [Awakening With Dry Mouth] : awakening with dry mouth [Awakes Unrefreshed] : awakening unrefreshed [Awakes with Headache] : headache upon awakening

## 2024-09-11 NOTE — PHYSICAL EXAM
[General Appearance - Well Developed] : well developed [General Appearance - Well Nourished] : well nourished [Normal Oropharynx] : normal oropharynx [Neck Appearance] : the appearance of the neck was normal [Apical Impulse] : the apical impulse was normal [Heart Rate And Rhythm] : heart rate was normal and rhythm regular [] : no respiratory distress [Respiration, Rhythm And Depth] : normal respiratory rhythm and effort [Nail Clubbing] : no clubbing of the fingernails [Cyanosis, Localized] : no localized cyanosis [Oriented To Time, Place, And Person] : oriented to person, place, and time [IV] : IV

## 2024-11-25 ENCOUNTER — APPOINTMENT (OUTPATIENT)
Dept: MRI IMAGING | Facility: IMAGING CENTER | Age: 64
End: 2024-11-25
Payer: MEDICARE

## 2024-11-25 ENCOUNTER — OUTPATIENT (OUTPATIENT)
Dept: OUTPATIENT SERVICES | Facility: HOSPITAL | Age: 64
LOS: 1 days | End: 2024-11-25
Payer: MEDICARE

## 2024-11-25 DIAGNOSIS — Z98.890 OTHER SPECIFIED POSTPROCEDURAL STATES: Chronic | ICD-10-CM

## 2024-11-25 DIAGNOSIS — Z98.49 CATARACT EXTRACTION STATUS, UNSPECIFIED EYE: Chronic | ICD-10-CM

## 2024-11-25 DIAGNOSIS — C81.90 HODGKIN LYMPHOMA, UNSPECIFIED, UNSPECIFIED SITE: ICD-10-CM

## 2024-11-25 DIAGNOSIS — Z98.891 HISTORY OF UTERINE SCAR FROM PREVIOUS SURGERY: Chronic | ICD-10-CM

## 2024-11-25 PROCEDURE — C8908: CPT

## 2024-11-25 PROCEDURE — 77049 MRI BREAST C-+ W/CAD BI: CPT | Mod: 26

## 2024-11-25 PROCEDURE — A9585: CPT

## 2024-11-25 PROCEDURE — C8937: CPT

## 2024-12-22 NOTE — H&P PST ADULT - FALLEN IN THE PAST
Anesthesia Pre-Procedure Evaluation    Patient: Monica Miguel   MRN: [id number] : 1968        Procedure : Procedure(s):  APPENDECTOMY, LAPAROSCOPIC          Past Medical History:   Diagnosis Date    Atopic eczema     Bipolar 1 disorder (H)     COVID-19 2020    Diabetes mellitus, type II (H)     GERD (gastroesophageal reflux disease)     Hyperlipidemia     Hypertension     Liver disease     Obesity     Schizophrenia (H)     Sleep apnea       Past Surgical History:   Procedure Laterality Date    ANKLE FRACTURE SURGERY        Allergies   Allergen Reactions    Other Environmental Allergy Unknown     pineapple    Penicillins Rash      Social History     Tobacco Use    Smoking status: Former    Smokeless tobacco: Not on file   Substance Use Topics    Alcohol use: Not on file      Wt Readings from Last 1 Encounters:   24 135.1 kg (297 lb 14.4 oz)        Prior to Admission medications    Medication Sig Start Date End Date Taking? Authorizing Provider   acetaminophen (TYLENOL) 500 MG tablet Take 500-1,000 mg by mouth every 6 hours as needed for mild pain    Reported, Patient   aspirin 81 MG EC tablet Take 81 mg by mouth daily. 3/13/24   Reported, Patient   atorvastatin (LIPITOR) 10 MG tablet Take 10 mg by mouth daily. 3/13/24   Reported, Patient   Blood Glucose Monitoring Suppl (BLOOD GLUCOSE MONITOR SYSTEM) w/Device KIT     Reported, Patient   cholecalciferol (VITAMIN D3) 1250 mcg (55519 units) capsule Take 50,000 Units by mouth once a week 19   Reported, Patient   cloZAPine (CLOZARIL) 25 MG tablet Take 25 mg by mouth At Bedtime    Reported, Patient   enalapril (VASOTEC) 2.5 MG tablet Take 2.5 mg by mouth daily.    Reported, Patient   ferrous sulfate (FEROSUL) 325 (65 Fe) MG tablet Take 1 tablet by mouth daily    Reported, Patient   furosemide (LASIX) 20 MG tablet Take 20 mg by mouth daily    Reported, Patient   gabapentin (NEURONTIN) 600 MG tablet Take 600 mg by mouth 3 times daily     Reported,  Patient   insulin glargine (LANTUS VIAL) 100 UNIT/ML vial Inject 46 Units subcutaneously daily. 12/2/24   Reported, Patient   OLANZapine (ZYPREXA) 15 MG tablet Take 15 mg by mouth At Bedtime    Reported, Patient   pioglitazone (ACTOS) 45 MG tablet Take 45 mg by mouth daily 9/18/20   Reported, Patient   Semaglutide, 1 MG/DOSE, (OZEMPIC) 4 MG/3ML pen Inject 1 mg subcutaneously every 7 days.    Reported, Patient   traZODone (DESYREL) 100 MG tablet Take 100 mg by mouth at bedtime. 9/21/19   Reported, Patient   venlafaxine (EFFEXOR-XR) 150 MG 24 hr capsule Take 150 mg by mouth daily 12/2/20   Reported, Patient     ECG 12/22/20: Normal sinus rhythm   Normal ECG     HGB 13.3  K 4.0    Anesthesia Evaluation   Pt has had prior anesthetic.     History of anesthetic complications       ROS/MED HX  ENT/Pulmonary:     (+) sleep apnea, doesn't use CPAP,              tobacco use, Past use,                       Neurologic:     (+)    peripheral neuropathy,                         (-) no seizures and migraines   Cardiovascular:     (+) Dyslipidemia hypertension- -   -  - -                                      METS/Exercise Tolerance:     Hematologic:       Musculoskeletal:  - neg musculoskeletal ROS     GI/Hepatic:     (+) GERD,        appendicitis,    liver disease,       Renal/Genitourinary:       Endo: Comment: Taking ozempic, last dose 12/21/24    (+)  type II DM,       Diabetic complications: neuropathy.      Obesity (morbid obesity),       Psychiatric/Substance Use:     (+) psychiatric history bipolar and schizophrenia       Infectious Disease:  - neg infectious disease ROS     Malignancy:       Other:            Physical Exam    Airway  airway exam normal      Mallampati: III   TM distance: > 3 FB   Neck ROM: full   Mouth opening: > 3 cm    Respiratory Devices and Support         Dental       (+) Minor Abnormalities - some fillings, tiny chips      Cardiovascular   cardiovascular exam normal       Rhythm and rate: regular  "and normal     Pulmonary   pulmonary exam normal        breath sounds clear to auscultation           OUTSIDE LABS:  CBC:   Lab Results   Component Value Date    WBC 6.8 12/27/2020    WBC 5.5 12/26/2020    HGB 12.7 12/27/2020    HGB 12.6 12/26/2020    HCT 39.8 12/27/2020    HCT 39.4 12/26/2020     12/27/2020     12/26/2020     BMP:   Lab Results   Component Value Date     08/20/2024     03/13/2024    POTASSIUM 4.4 08/20/2024    POTASSIUM 4.4 03/13/2024    CHLORIDE 100 08/20/2024    CHLORIDE 97 (L) 03/13/2024    CO2 29 08/20/2024    CO2 26 03/13/2024    BUN 19.0 08/20/2024    BUN 19.2 03/13/2024    CR 0.60 08/20/2024    CR 0.60 03/13/2024     (H) 12/22/2024     (H) 08/20/2024     COAGS:   Lab Results   Component Value Date    INR 1.09 12/27/2020    FIBR 453 (H) 12/27/2020     POC: No results found for: \"BGM\", \"HCG\", \"HCGS\"  HEPATIC:   Lab Results   Component Value Date    ALBUMIN 3.9 08/20/2024    PROTTOTAL 7.0 08/20/2024    ALT 18 08/20/2024    AST 16 08/20/2024    ALKPHOS 117 08/20/2024    BILITOTAL 0.2 08/20/2024     OTHER:   Lab Results   Component Value Date    LACT 0.9 12/22/2020    A1C 6.1 04/09/2021    JUAN DIEGO 9.0 08/20/2024    MAG 1.9 12/22/2020    CRP 1.4 (H) 12/27/2020       Anesthesia Plan    ASA Status:  3    NPO Status:  ELEVATED Aspiration Risk/Unknown    Anesthesia Type: General.     - Airway: ETT   Induction: RSI, Propofol.   Maintenance: Balanced.   Techniques and Equipment:     - Airway: Video-Laryngoscope       Consents    Anesthesia Plan(s) and associated risks, benefits, and realistic alternatives discussed. Questions answered and patient/representative(s) expressed understanding.     - Discussed:     - Discussed with:  Patient            Postoperative Care    Pain management: Multi-modal analgesia.   PONV prophylaxis: Ondansetron (or other 5HT-3), Background Propofol Infusion     Comments:               Dianna Solomon MD    I have reviewed the pertinent " "notes and labs in the chart from the past 30 days and (re)examined the patient.  Any updates or changes from those notes are reflected in this note.             # Drug Induced Platelet Defect: home medication list includes an antiplatelet medication   # Hypertension: Home medication list includes antihypertensive(s)           # Severe Obesity: Estimated body mass index is 49.57 kg/m  as calculated from the following:    Height as of this encounter: 1.651 m (5' 5\").    Weight as of this encounter: 135.1 kg (297 lb 14.4 oz).             " no

## 2024-12-24 ENCOUNTER — APPOINTMENT (OUTPATIENT)
Dept: INTERNAL MEDICINE | Facility: CLINIC | Age: 64
End: 2024-12-24
Payer: MEDICARE

## 2024-12-24 ENCOUNTER — OUTPATIENT (OUTPATIENT)
Dept: OUTPATIENT SERVICES | Facility: HOSPITAL | Age: 64
LOS: 1 days | End: 2024-12-24
Payer: MEDICARE

## 2024-12-24 VITALS
OXYGEN SATURATION: 98 % | BODY MASS INDEX: 23.99 KG/M2 | HEIGHT: 59 IN | DIASTOLIC BLOOD PRESSURE: 70 MMHG | SYSTOLIC BLOOD PRESSURE: 144 MMHG | HEART RATE: 101 BPM | WEIGHT: 119 LBS

## 2024-12-24 VITALS — HEART RATE: 88 BPM | SYSTOLIC BLOOD PRESSURE: 140 MMHG | DIASTOLIC BLOOD PRESSURE: 70 MMHG

## 2024-12-24 DIAGNOSIS — Z98.891 HISTORY OF UTERINE SCAR FROM PREVIOUS SURGERY: Chronic | ICD-10-CM

## 2024-12-24 DIAGNOSIS — I10 ESSENTIAL (PRIMARY) HYPERTENSION: ICD-10-CM

## 2024-12-24 DIAGNOSIS — Z98.890 OTHER SPECIFIED POSTPROCEDURAL STATES: Chronic | ICD-10-CM

## 2024-12-24 DIAGNOSIS — Z98.49 CATARACT EXTRACTION STATUS, UNSPECIFIED EYE: Chronic | ICD-10-CM

## 2024-12-24 DIAGNOSIS — R00.2 PALPITATIONS: ICD-10-CM

## 2024-12-24 DIAGNOSIS — M81.0 AGE-RELATED OSTEOPOROSIS W/OUT CURRENT PATHOLOGICAL FRACTURE: ICD-10-CM

## 2024-12-24 DIAGNOSIS — E03.8 OTHER SPECIFIED HYPOTHYROIDISM: ICD-10-CM

## 2024-12-24 DIAGNOSIS — E78.5 HYPERLIPIDEMIA, UNSPECIFIED: ICD-10-CM

## 2024-12-24 DIAGNOSIS — M81.0 AGE-RELATED OSTEOPOROSIS WITHOUT CURRENT PATHOLOGICAL FRACTURE: ICD-10-CM

## 2024-12-24 LAB
25(OH)D3 SERPL-MCNC: 43.8 NG/ML
ALBUMIN SERPL ELPH-MCNC: 4.7 G/DL
ALP BLD-CCNC: 83 U/L
ALT SERPL-CCNC: 17 U/L
ANION GAP SERPL CALC-SCNC: 13 MMOL/L
AST SERPL-CCNC: 24 U/L
BILIRUB SERPL-MCNC: 0.6 MG/DL
BUN SERPL-MCNC: 15 MG/DL
CALCIUM SERPL-MCNC: 10 MG/DL
CALCIUM SERPL-MCNC: 10 MG/DL
CHLORIDE SERPL-SCNC: 103 MMOL/L
CHOLEST SERPL-MCNC: 176 MG/DL
CO2 SERPL-SCNC: 26 MMOL/L
CREAT SERPL-MCNC: 0.55 MG/DL
EGFR: 102 ML/MIN/1.73M2
GLUCOSE SERPL-MCNC: 102 MG/DL
HDLC SERPL-MCNC: 86 MG/DL
LDLC SERPL CALC-MCNC: 78 MG/DL
NONHDLC SERPL-MCNC: 90 MG/DL
PARATHYROID HORMONE INTACT: 80 PG/ML
POTASSIUM SERPL-SCNC: 4 MMOL/L
PROT SERPL-MCNC: 7.5 G/DL
SODIUM SERPL-SCNC: 142 MMOL/L
TRIGL SERPL-MCNC: 66 MG/DL
TSH SERPL-ACNC: 1.36 UIU/ML

## 2024-12-24 PROCEDURE — 99204 OFFICE O/P NEW MOD 45 MIN: CPT

## 2024-12-24 PROCEDURE — G0463: CPT

## 2024-12-24 PROCEDURE — G2211 COMPLEX E/M VISIT ADD ON: CPT

## 2024-12-26 ENCOUNTER — APPOINTMENT (OUTPATIENT)
Dept: OPHTHALMOLOGY | Facility: CLINIC | Age: 64
End: 2024-12-26

## 2024-12-26 ENCOUNTER — NON-APPOINTMENT (OUTPATIENT)
Age: 64
End: 2024-12-26

## 2024-12-26 PROCEDURE — 68761 CLOSE TEAR DUCT OPENING: CPT | Mod: E2,E4

## 2024-12-26 PROCEDURE — 92004 COMPRE OPH EXAM NEW PT 1/>: CPT | Mod: 25

## 2024-12-30 ENCOUNTER — NON-APPOINTMENT (OUTPATIENT)
Age: 64
End: 2024-12-30

## 2024-12-30 ENCOUNTER — OUTPATIENT (OUTPATIENT)
Dept: OUTPATIENT SERVICES | Facility: HOSPITAL | Age: 64
LOS: 1 days | End: 2024-12-30
Payer: MEDICARE

## 2024-12-30 ENCOUNTER — APPOINTMENT (OUTPATIENT)
Dept: SLEEP CENTER | Facility: CLINIC | Age: 64
End: 2024-12-30
Payer: MEDICARE

## 2024-12-30 ENCOUNTER — APPOINTMENT (OUTPATIENT)
Dept: GASTROENTEROLOGY | Facility: CLINIC | Age: 64
End: 2024-12-30
Payer: MEDICARE

## 2024-12-30 VITALS
BODY MASS INDEX: 25.4 KG/M2 | DIASTOLIC BLOOD PRESSURE: 83 MMHG | SYSTOLIC BLOOD PRESSURE: 154 MMHG | HEART RATE: 79 BPM | OXYGEN SATURATION: 98 % | WEIGHT: 126 LBS | HEIGHT: 59 IN | TEMPERATURE: 97.5 F

## 2024-12-30 DIAGNOSIS — Z98.890 OTHER SPECIFIED POSTPROCEDURAL STATES: Chronic | ICD-10-CM

## 2024-12-30 DIAGNOSIS — Z98.49 CATARACT EXTRACTION STATUS, UNSPECIFIED EYE: Chronic | ICD-10-CM

## 2024-12-30 DIAGNOSIS — Z12.11 ENCOUNTER FOR SCREENING FOR MALIGNANT NEOPLASM OF COLON: ICD-10-CM

## 2024-12-30 DIAGNOSIS — Z98.891 HISTORY OF UTERINE SCAR FROM PREVIOUS SURGERY: Chronic | ICD-10-CM

## 2024-12-30 PROCEDURE — 95810 POLYSOM 6/> YRS 4/> PARAM: CPT

## 2024-12-30 PROCEDURE — 95810 POLYSOM 6/> YRS 4/> PARAM: CPT | Mod: 26

## 2024-12-30 PROCEDURE — 99203 OFFICE O/P NEW LOW 30 MIN: CPT

## 2024-12-30 RX ORDER — FLUCONAZOLE 150 MG/1
TABLET ORAL
Refills: 0 | Status: ACTIVE | COMMUNITY

## 2024-12-30 RX ORDER — POLYETHYLENE GLYCOL 3350, SODIUM SULFATE, POTASSIUM CHLORIDE, MAGNESIUM SULFATE, AND SODIUM CHLORIDE FOR ORAL SOLUTION 178.7-7.3G
178.7 KIT ORAL
Qty: 1 | Refills: 0 | Status: ACTIVE | COMMUNITY
Start: 2024-12-30 | End: 1900-01-01

## 2024-12-31 ENCOUNTER — APPOINTMENT (OUTPATIENT)
Dept: OBGYN | Facility: CLINIC | Age: 64
End: 2024-12-31
Payer: MEDICARE

## 2024-12-31 VITALS
BODY MASS INDEX: 23.79 KG/M2 | HEIGHT: 59 IN | WEIGHT: 118 LBS | DIASTOLIC BLOOD PRESSURE: 78 MMHG | SYSTOLIC BLOOD PRESSURE: 150 MMHG

## 2024-12-31 DIAGNOSIS — N89.8 OTHER SPECIFIED NONINFLAMMATORY DISORDERS OF VAGINA: ICD-10-CM

## 2024-12-31 DIAGNOSIS — R10.2 PELVIC AND PERINEAL PAIN: ICD-10-CM

## 2024-12-31 LAB
BILIRUB UR QL STRIP: NORMAL
GLUCOSE UR-MCNC: NORMAL
HCG UR QL: 0.2 EU/DL
HGB UR QL STRIP.AUTO: NORMAL
KETONES UR-MCNC: NORMAL
LEUKOCYTE ESTERASE UR QL STRIP: NORMAL
NITRITE UR QL STRIP: NORMAL
PH UR STRIP: 7.5
PROT UR STRIP-MCNC: NORMAL
SP GR UR STRIP: 1.02

## 2024-12-31 PROCEDURE — 99203 OFFICE O/P NEW LOW 30 MIN: CPT

## 2025-01-02 LAB
BACTERIA UR CULT: NORMAL
BV BACTERIA RRNA VAG QL NAA+PROBE: NOT DETECTED
C GLABRATA RNA VAG QL NAA+PROBE: NOT DETECTED
CANDIDA RRNA VAG QL PROBE: NOT DETECTED
T VAGINALIS RRNA SPEC QL NAA+PROBE: NOT DETECTED

## 2025-01-03 DIAGNOSIS — G47.33 OBSTRUCTIVE SLEEP APNEA (ADULT) (PEDIATRIC): ICD-10-CM

## 2025-01-07 ENCOUNTER — APPOINTMENT (OUTPATIENT)
Dept: PULMONOLOGY | Facility: CLINIC | Age: 65
End: 2025-01-07
Payer: MEDICARE

## 2025-01-07 PROCEDURE — 99203 OFFICE O/P NEW LOW 30 MIN: CPT

## 2025-01-07 PROCEDURE — G2211 COMPLEX E/M VISIT ADD ON: CPT

## 2025-01-13 ENCOUNTER — APPOINTMENT (OUTPATIENT)
Dept: INTERNAL MEDICINE | Facility: CLINIC | Age: 65
End: 2025-01-13

## 2025-01-13 DIAGNOSIS — K21.9 GASTRO-ESOPHAGEAL REFLUX DISEASE W/OUT ESOPHAGITIS: ICD-10-CM

## 2025-01-13 DIAGNOSIS — Z23 ENCOUNTER FOR IMMUNIZATION: ICD-10-CM

## 2025-01-13 DIAGNOSIS — I10 ESSENTIAL (PRIMARY) HYPERTENSION: ICD-10-CM

## 2025-01-13 DIAGNOSIS — R89.9 UNSPECIFIED ABNORMAL FINDING IN SPECIMENS FROM OTHER ORGANS, SYSTEMS AND TISSUES: ICD-10-CM

## 2025-01-13 DIAGNOSIS — G47.33 OBSTRUCTIVE SLEEP APNEA (ADULT) (PEDIATRIC): ICD-10-CM

## 2025-01-13 PROCEDURE — 99214 OFFICE O/P EST MOD 30 MIN: CPT | Mod: 2W

## 2025-01-13 PROCEDURE — G2211 COMPLEX E/M VISIT ADD ON: CPT | Mod: 2W

## 2025-01-16 PROBLEM — M54.50 CHRONIC LOW BACK PAIN: Status: ACTIVE | Noted: 2020-09-07

## 2025-01-16 PROBLEM — R89.9 ABNORMAL LABORATORY TEST: Status: ACTIVE | Noted: 2025-01-13

## 2025-01-17 ENCOUNTER — APPOINTMENT (OUTPATIENT)
Dept: ULTRASOUND IMAGING | Facility: CLINIC | Age: 65
End: 2025-01-17
Payer: MEDICARE

## 2025-01-17 PROCEDURE — 76830 TRANSVAGINAL US NON-OB: CPT

## 2025-01-17 PROCEDURE — 76856 US EXAM PELVIC COMPLETE: CPT | Mod: 59

## 2025-01-21 ENCOUNTER — APPOINTMENT (OUTPATIENT)
Dept: CARDIOLOGY | Facility: CLINIC | Age: 65
End: 2025-01-21

## 2025-01-21 ENCOUNTER — NON-APPOINTMENT (OUTPATIENT)
Age: 65
End: 2025-01-21

## 2025-01-23 ENCOUNTER — APPOINTMENT (OUTPATIENT)
Facility: CLINIC | Age: 65
End: 2025-01-23
Payer: MEDICARE

## 2025-01-23 ENCOUNTER — NON-APPOINTMENT (OUTPATIENT)
Age: 65
End: 2025-01-23

## 2025-01-23 DIAGNOSIS — M54.50 LOW BACK PAIN, UNSPECIFIED: ICD-10-CM

## 2025-01-23 DIAGNOSIS — M25.559 PAIN IN UNSPECIFIED HIP: ICD-10-CM

## 2025-01-23 DIAGNOSIS — G89.29 LOW BACK PAIN, UNSPECIFIED: ICD-10-CM

## 2025-01-23 DIAGNOSIS — G89.29 PAIN IN UNSPECIFIED HIP: ICD-10-CM

## 2025-01-23 PROCEDURE — 99205 OFFICE O/P NEW HI 60 MIN: CPT

## 2025-01-23 RX ORDER — MELOXICAM 7.5 MG/1
7.5 TABLET ORAL
Qty: 14 | Refills: 0 | Status: ACTIVE | COMMUNITY
Start: 2025-01-23 | End: 1900-01-01

## 2025-01-30 DIAGNOSIS — M81.0 AGE-RELATED OSTEOPOROSIS W/OUT CURRENT PATHOLOGICAL FRACTURE: ICD-10-CM

## 2025-02-25 ENCOUNTER — TRANSCRIPTION ENCOUNTER (OUTPATIENT)
Age: 65
End: 2025-02-25

## 2025-02-25 ENCOUNTER — RESULT REVIEW (OUTPATIENT)
Age: 65
End: 2025-02-25

## 2025-02-25 ENCOUNTER — OUTPATIENT (OUTPATIENT)
Dept: OUTPATIENT SERVICES | Facility: HOSPITAL | Age: 65
LOS: 1 days | Discharge: HOME | End: 2025-02-25
Payer: MEDICARE

## 2025-02-25 ENCOUNTER — APPOINTMENT (OUTPATIENT)
Dept: GASTROENTEROLOGY | Facility: HOSPITAL | Age: 65
End: 2025-02-25

## 2025-02-25 VITALS
OXYGEN SATURATION: 100 % | DIASTOLIC BLOOD PRESSURE: 67 MMHG | RESPIRATION RATE: 18 BRPM | HEART RATE: 78 BPM | SYSTOLIC BLOOD PRESSURE: 104 MMHG

## 2025-02-25 VITALS
HEIGHT: 60 IN | TEMPERATURE: 98 F | OXYGEN SATURATION: 99 % | DIASTOLIC BLOOD PRESSURE: 71 MMHG | RESPIRATION RATE: 17 BRPM | WEIGHT: 119.05 LBS | SYSTOLIC BLOOD PRESSURE: 127 MMHG | HEART RATE: 83 BPM

## 2025-02-25 DIAGNOSIS — Z98.891 HISTORY OF UTERINE SCAR FROM PREVIOUS SURGERY: Chronic | ICD-10-CM

## 2025-02-25 DIAGNOSIS — Z98.49 CATARACT EXTRACTION STATUS, UNSPECIFIED EYE: Chronic | ICD-10-CM

## 2025-02-25 DIAGNOSIS — R19.7 DIARRHEA, UNSPECIFIED: ICD-10-CM

## 2025-02-25 DIAGNOSIS — Z98.890 OTHER SPECIFIED POSTPROCEDURAL STATES: Chronic | ICD-10-CM

## 2025-02-25 PROCEDURE — 45385 COLONOSCOPY W/LESION REMOVAL: CPT

## 2025-02-25 PROCEDURE — 88305 TISSUE EXAM BY PATHOLOGIST: CPT | Mod: 26

## 2025-02-25 RX ORDER — PROPRANOLOL HCL 60 MG
0 TABLET ORAL
Refills: 0 | DISCHARGE

## 2025-02-25 NOTE — ASU DISCHARGE PLAN (ADULT/PEDIATRIC) - FINANCIAL ASSISTANCE
[facility] provides services at a reduced cost to those who are determined to be eligible through [facility]’s financial assistance program. Information regarding [facility]’s financial assistance program can be found by going to [email or web address] or by calling [phone].

## 2025-02-25 NOTE — PRE PROCEDURE NOTE - PRE PROCEDURE EVALUATION
Attending Physician:  Dr. Silva                Procedure:ECO    Indication for Procedure: EGD colonoscopy   CRC screening abd pain  ________________________________________________________  PAST MEDICAL & SURGICAL HISTORY:  Hypertension      Abnormal Pap smear of cervix      Sleep apnea      Hodgkin lymphoma  dx , s/p chemo and radiation last dose       Borderline diabetes      S/P   , , ,       H/O surgical biopsy  2001-uterine      S/P cataract surgery        ALLERGIES:  Sudafed 12-Hour (Other)  latex (Other)    HOME MEDICATIONS:  atorvastatin: milligram(s) orally once a day  hydroCHLOROthiazide 12.5 mg oral capsule: 1 cap(s) orally once a day  levothyroxine: microgram(s) orally once a day  propranolol:     AICD/PPM: [ ] yes   [x ] no    PERTINENT LAB DATA:                      PHYSICAL EXAMINATION:    Height (cm): 152.4  Weight (kg): 54  BMI (kg/m2): 23.3  BSA (m2): 1.5T(C): 36.4  HR: 83  BP: 127/71  RR: 17  SpO2: 99%    Constitutional: NAD  HEENT: PERRLA, EOMI,    Neck:  No JVD  Respiratory: CTAB/L  Cardiovascular: S1 and S2  Gastrointestinal: BS+, soft, NT/ND  Extremities: No peripheral edema  Neurological: A/O x 3, no focal deficits  Psychiatric: Normal mood, normal affect  Skin: No rashes    ASA Class: I [ ]  II [ x]  III [ ]  IV [ ]    COMMENTS:    The patient is a suitable candidate for the planned procedure unless box checked [ ]  No, explain:     Attending Physician:  Dr. Silva                Procedure:Colonoscopy    Indication for Procedure: colonoscopy   CRC screening________________________________________________________  PAST MEDICAL & SURGICAL HISTORY:  Hypertension      Abnormal Pap smear of cervix      Sleep apnea      Hodgkin lymphoma  dx , s/p chemo and radiation last dose       Borderline diabetes      S/P   , , ,       H/O surgical biopsy  2001-uterine      S/P cataract surgery        ALLERGIES:  Sudafed 12-Hour (Other)  latex (Other)    HOME MEDICATIONS:  atorvastatin: milligram(s) orally once a day  hydroCHLOROthiazide 12.5 mg oral capsule: 1 cap(s) orally once a day  levothyroxine: microgram(s) orally once a day  propranolol:     AICD/PPM: [ ] yes   [x ] no    PERTINENT LAB DATA:                      PHYSICAL EXAMINATION:    Height (cm): 152.4  Weight (kg): 54  BMI (kg/m2): 23.3  BSA (m2): 1.5T(C): 36.4  HR: 83  BP: 127/71  RR: 17  SpO2: 99%    Constitutional: NAD  HEENT: PERRLA, EOMI,    Neck:  No JVD  Respiratory: CTAB/L  Cardiovascular: S1 and S2  Gastrointestinal: BS+, soft, NT/ND  Extremities: No peripheral edema  Neurological: A/O x 3, no focal deficits  Psychiatric: Normal mood, normal affect  Skin: No rashes    ASA Class: I [ ]  II [ x]  III [ ]  IV [ ]    COMMENTS:    The patient is a suitable candidate for the planned procedure unless box checked [ ]  No, explain:

## 2025-02-25 NOTE — ASU PREOP CHECKLIST - INTERNAL PROSTHESES
Patient Name: Dejan Burnett  : 1973  MRN: [id number]  DATE: 21      ENDOSCOPY  History and Physical    Procedure:    [] Diagnostic Colonoscopy       [] Screening Colonoscopy  [x] EGD      [] ERCP      [] EUS       [] Other    [x] Previous office notes/History and Physical reviewed from the patients chart. Please see EMR for further details of HPI. I have examined the patient's status immediately prior to the procedure and:      Indications/HPI:    []Abdominal Pain   []Cancer- GI/Lung  []Fhx of colon CA/polyps  []History of Polyps   []Swans   []Melena  []Abnormal Imaging   []Dysphagia    []Persistent Pneumonia  []Anemia   []Food Impaction  []History of Polyps  []GI Bleed   []Pulmonary nodule/Mass  []Change in bowel habits  [x]Heartburn/Reflux  []Rectal Bleed (BRBPR)  []Chest Pain - Non Cardiac  []Heme (+) Stool  []Ulcers  []Constipation   []Hemoptysis   []Varices  []Diarrhea   []Hypoxemia  []Nausea/Vomiting   []Screening   []Crohns/Colitis  []Other:    Anesthesia:   [x] MAC [] Moderate Sedation   [] General   [] None     ROS: 12 pt Review of Symptoms was negative unless mentioned above    Medications:   Prior to Admission medications    Medication Sig Start Date End Date Taking?  Authorizing Provider   metFORMIN (GLUCOPHAGE) 1000 MG tablet Take 1 tablet by mouth 2 times daily (with meals) 21   David Cordoba MD   lovastatin (MEVACOR) 20 MG tablet Take 1 tablet by mouth nightly 21   David Cordoba MD   lisinopril (PRINIVIL;ZESTRIL) 5 MG tablet Take 1 tablet by mouth daily 21   David Cordoba MD   glimepiride (AMARYL) 2 MG tablet TAKE 1 TABLET DAILY 21   David Cordoba MD   escitalopram (LEXAPRO) 10 MG tablet Take 1 tablet Every day for Anxiety/Depression 21   David Cordoba MD   esomeprazole (NEXIUM) 40 MG delayed release capsule Take 1 capsule by mouth every morning (before breakfast) 3/15/21   Hamida More MD   famotidine (PEPCID) 20 MG tablet Take 1 tablet by mouth nightly as needed (heraburn) 3/15/21   Mariah Waddell MD   Multiple Vitamins-Minerals (THERAPEUTIC MULTIVITAMIN-MINERALS) tablet Take 1 tablet by mouth daily    Historical Provider, MD   Cholecalciferol (VITAMIN D3) 2000 units CAPS Take by mouth    Historical Provider, MD       Allergies: Allergies   Allergen Reactions    Cat Hair Extract Itching        History of allergic reaction to anesthesia:  No    Past Medical History:  Past Medical History:   Diagnosis Date    Asthma     Hyperlipidemia     Hypertension     Indigestion     Type 2 diabetes mellitus without complication ([address]        Past Surgical History:  Past Surgical History:   Procedure Laterality Date    [place] ESOPHAGOGASTRODUODENOSCOPY TRANSORAL DIAGNOSTIC N/A 8/27/2018    EGD ESOPHAGOGASTRODUODENOSCOPY performed by Chi Mcdonough MD at [facility]         Social History:  Social History     Tobacco Use    Smoking status: Former Smoker    Smokeless tobacco: Never Used   Substance Use Topics    Alcohol use: Yes     Comment: Social     Drug use: No       Vital Signs: There were no vitals filed for this visit. Physical Exam:  Cardiac:  [x]WNL  []Comments:  Pulmonary:  [x]WNL   []Comments:   Neuro/Mental Status:  [x]WNL  []Comments:  Abdominal:  [x]WNL    []Comments:  Other:   []WNL  []Comments:    Informed Consent:  The risks and benefits of the procedure have been discussed with either the patient or if they cannot consent, their representative. Assessment:  Patient examined and appropriate for planned sedation and procedure. Plan:  Proceed with planned sedation and procedure as above.     Mariah Waddell MD  7:28 AM landmark in L breast/yes(specify)

## 2025-02-25 NOTE — ASU PREOP CHECKLIST - ANTIBIOTIC
-- DO NOT REPLY / DO NOT REPLY ALL --  -- This inbox is not monitored. If this was sent to the wrong provider or department, reroute message to [facility]. --  -- Message is from Engagement Center Operations (ECO) --    General Patient Message: Patient called stating she received her instructions for her colonoscopy are stating her to consume more liquids then she can consume in a daily basis. She states she is only able to consume 24-32 oz of liquid a day because she is a dialysis patient. She stated the nurse told her she should be getting special instructions because she is a dialysis patient. Please assist.       Alternative phone number: [phone]     Can a detailed message be left? Yes - Voicemail   Patient has been advised the message will be addressed within 2-3 business days.                
I spoke to PT she needs prep instructions AK will call her back   
n/a

## 2025-02-25 NOTE — ASU PREOP CHECKLIST - HEIGHT IN CM
152.4 Island Pedicle Flap With Canthal Suspension Text: The defect edges were debeveled with a #15 scalpel blade.  Given the location of the defect, shape of the defect and the proximity to free margins an island pedicle advancement flap was deemed most appropriate.  Using a sterile surgical marker, an appropriate advancement flap was drawn incorporating the defect, outlining the appropriate donor tissue and placing the expected incisions within the relaxed skin tension lines where possible. The area thus outlined was incised deep to adipose tissue with a #15 scalpel blade.  The skin margins were undermined to an appropriate distance in all directions around the primary defect and laterally outward around the island pedicle utilizing iris scissors.  There was minimal undermining beneath the pedicle flap. A suspension suture was placed in the canthal tendon to prevent tension and prevent ectropion.

## 2025-02-25 NOTE — ASU PATIENT PROFILE, ADULT - PREOP PAIN SCORE
Abilify 5 mg daily, may go up on the dose.  Clonidine 0.1 mg at bedtime  Increase gabapentin 300 mg 3 times daily  Melatonin 3 mg at bedtime  Remeron to 30 mg at bedtime   0

## 2025-02-27 ENCOUNTER — APPOINTMENT (OUTPATIENT)
Facility: HOSPITAL | Age: 65
End: 2025-02-27

## 2025-02-27 ENCOUNTER — OUTPATIENT (OUTPATIENT)
Dept: OUTPATIENT SERVICES | Facility: HOSPITAL | Age: 65
LOS: 1 days | Discharge: LEFT BEFORE DISCHARGE | End: 2025-02-27

## 2025-02-27 DIAGNOSIS — Z98.890 OTHER SPECIFIED POSTPROCEDURAL STATES: Chronic | ICD-10-CM

## 2025-02-27 DIAGNOSIS — Z98.891 HISTORY OF UTERINE SCAR FROM PREVIOUS SURGERY: Chronic | ICD-10-CM

## 2025-02-27 DIAGNOSIS — G47.30 SLEEP APNEA, UNSPECIFIED: ICD-10-CM

## 2025-02-27 DIAGNOSIS — Z98.49 CATARACT EXTRACTION STATUS, UNSPECIFIED EYE: Chronic | ICD-10-CM

## 2025-02-27 LAB — SURGICAL PATHOLOGY STUDY: SIGNIFICANT CHANGE UP

## 2025-02-27 PROCEDURE — 95811 POLYSOM 6/>YRS CPAP 4/> PARM: CPT | Mod: 26

## 2025-03-03 ENCOUNTER — RX RENEWAL (OUTPATIENT)
Age: 65
End: 2025-03-03

## 2025-03-10 ENCOUNTER — APPOINTMENT (OUTPATIENT)
Dept: PULMONOLOGY | Facility: CLINIC | Age: 65
End: 2025-03-10

## 2025-03-25 ENCOUNTER — NON-APPOINTMENT (OUTPATIENT)
Age: 65
End: 2025-03-25

## 2025-03-25 ENCOUNTER — APPOINTMENT (OUTPATIENT)
Dept: INTERNAL MEDICINE | Facility: CLINIC | Age: 65
End: 2025-03-25
Payer: MEDICARE

## 2025-03-25 ENCOUNTER — OUTPATIENT (OUTPATIENT)
Dept: OUTPATIENT SERVICES | Facility: HOSPITAL | Age: 65
LOS: 1 days | End: 2025-03-25
Payer: MEDICARE

## 2025-03-25 ENCOUNTER — APPOINTMENT (OUTPATIENT)
Dept: CARDIOLOGY | Facility: CLINIC | Age: 65
End: 2025-03-25
Payer: MEDICARE

## 2025-03-25 VITALS
BODY MASS INDEX: 23.79 KG/M2 | HEART RATE: 95 BPM | OXYGEN SATURATION: 100 % | DIASTOLIC BLOOD PRESSURE: 84 MMHG | WEIGHT: 118 LBS | HEIGHT: 59 IN | SYSTOLIC BLOOD PRESSURE: 159 MMHG

## 2025-03-25 VITALS
BODY MASS INDEX: 23.39 KG/M2 | HEART RATE: 99 BPM | SYSTOLIC BLOOD PRESSURE: 132 MMHG | HEIGHT: 59 IN | WEIGHT: 116 LBS | OXYGEN SATURATION: 97 % | DIASTOLIC BLOOD PRESSURE: 70 MMHG

## 2025-03-25 VITALS — SYSTOLIC BLOOD PRESSURE: 128 MMHG | DIASTOLIC BLOOD PRESSURE: 72 MMHG

## 2025-03-25 DIAGNOSIS — E78.5 HYPERLIPIDEMIA, UNSPECIFIED: ICD-10-CM

## 2025-03-25 DIAGNOSIS — R00.2 PALPITATIONS: ICD-10-CM

## 2025-03-25 DIAGNOSIS — E03.8 OTHER SPECIFIED HYPOTHYROIDISM: ICD-10-CM

## 2025-03-25 DIAGNOSIS — I51.89 OTHER ILL-DEFINED HEART DISEASES: ICD-10-CM

## 2025-03-25 DIAGNOSIS — R73.09 OTHER ABNORMAL GLUCOSE: ICD-10-CM

## 2025-03-25 DIAGNOSIS — I49.1 ATRIAL PREMATURE DEPOLARIZATION: ICD-10-CM

## 2025-03-25 DIAGNOSIS — I10 ESSENTIAL (PRIMARY) HYPERTENSION: ICD-10-CM

## 2025-03-25 DIAGNOSIS — Z98.890 OTHER SPECIFIED POSTPROCEDURAL STATES: Chronic | ICD-10-CM

## 2025-03-25 DIAGNOSIS — F41.9 ANXIETY DISORDER, UNSPECIFIED: ICD-10-CM

## 2025-03-25 DIAGNOSIS — Z98.891 HISTORY OF UTERINE SCAR FROM PREVIOUS SURGERY: Chronic | ICD-10-CM

## 2025-03-25 PROCEDURE — G2211 COMPLEX E/M VISIT ADD ON: CPT

## 2025-03-25 PROCEDURE — 93000 ELECTROCARDIOGRAM COMPLETE: CPT

## 2025-03-25 PROCEDURE — G0463: CPT

## 2025-03-25 PROCEDURE — 99214 OFFICE O/P EST MOD 30 MIN: CPT

## 2025-03-25 RX ORDER — LISINOPRIL 10 MG/1
10 TABLET ORAL
Qty: 30 | Refills: 3 | Status: ACTIVE | COMMUNITY
Start: 2025-03-25

## 2025-03-25 RX ORDER — HYDROXYZINE HYDROCHLORIDE 25 MG/1
25 TABLET ORAL
Qty: 60 | Refills: 0 | Status: ACTIVE | COMMUNITY
Start: 2025-03-25 | End: 1900-01-01

## 2025-03-26 ENCOUNTER — APPOINTMENT (OUTPATIENT)
Dept: PULMONOLOGY | Facility: CLINIC | Age: 65
End: 2025-03-26
Payer: MEDICARE

## 2025-03-26 VITALS
SYSTOLIC BLOOD PRESSURE: 159 MMHG | HEART RATE: 86 BPM | RESPIRATION RATE: 17 BRPM | BODY MASS INDEX: 22.98 KG/M2 | DIASTOLIC BLOOD PRESSURE: 84 MMHG | WEIGHT: 114 LBS | OXYGEN SATURATION: 99 % | HEIGHT: 59 IN

## 2025-03-26 DIAGNOSIS — J47.9 BRONCHIECTASIS, UNCOMPLICATED: ICD-10-CM

## 2025-03-26 DIAGNOSIS — J84.10 PULMONARY FIBROSIS, UNSPECIFIED: ICD-10-CM

## 2025-03-26 PROCEDURE — 99214 OFFICE O/P EST MOD 30 MIN: CPT

## 2025-04-01 ENCOUNTER — APPOINTMENT (OUTPATIENT)
Facility: CLINIC | Age: 65
End: 2025-04-01
Payer: MEDICARE

## 2025-04-01 DIAGNOSIS — R10.2 PELVIC AND PERINEAL PAIN: ICD-10-CM

## 2025-04-01 DIAGNOSIS — M25.559 PAIN IN UNSPECIFIED HIP: ICD-10-CM

## 2025-04-01 DIAGNOSIS — M54.50 LOW BACK PAIN, UNSPECIFIED: ICD-10-CM

## 2025-04-01 DIAGNOSIS — C81.90 HODGKIN LYMPHOMA, UNSPECIFIED, UNSPECIFIED SITE: ICD-10-CM

## 2025-04-01 DIAGNOSIS — G89.29 PAIN IN UNSPECIFIED HIP: ICD-10-CM

## 2025-04-01 PROCEDURE — 99214 OFFICE O/P EST MOD 30 MIN: CPT

## 2025-04-07 ENCOUNTER — NON-APPOINTMENT (OUTPATIENT)
Age: 65
End: 2025-04-07

## 2025-04-08 ENCOUNTER — APPOINTMENT (OUTPATIENT)
Dept: PULMONOLOGY | Facility: CLINIC | Age: 65
End: 2025-04-08
Payer: MEDICARE

## 2025-04-08 DIAGNOSIS — G47.33 OBSTRUCTIVE SLEEP APNEA (ADULT) (PEDIATRIC): ICD-10-CM

## 2025-04-08 PROCEDURE — G2211 COMPLEX E/M VISIT ADD ON: CPT | Mod: 2W

## 2025-04-08 PROCEDURE — 99214 OFFICE O/P EST MOD 30 MIN: CPT | Mod: 2W

## 2025-04-09 ENCOUNTER — APPOINTMENT (OUTPATIENT)
Dept: CV DIAGNOSTICS | Facility: HOSPITAL | Age: 65
End: 2025-04-09

## 2025-04-10 ENCOUNTER — APPOINTMENT (OUTPATIENT)
Dept: CARDIOLOGY | Facility: CLINIC | Age: 65
End: 2025-04-10
Payer: MEDICARE

## 2025-04-10 ENCOUNTER — NON-APPOINTMENT (OUTPATIENT)
Age: 65
End: 2025-04-10

## 2025-04-10 ENCOUNTER — APPOINTMENT (OUTPATIENT)
Dept: ELECTROPHYSIOLOGY | Facility: CLINIC | Age: 65
End: 2025-04-10

## 2025-04-10 VITALS
HEART RATE: 91 BPM | DIASTOLIC BLOOD PRESSURE: 82 MMHG | OXYGEN SATURATION: 100 % | BODY MASS INDEX: 22.98 KG/M2 | WEIGHT: 114 LBS | SYSTOLIC BLOOD PRESSURE: 146 MMHG | HEIGHT: 59 IN

## 2025-04-10 VITALS — SYSTOLIC BLOOD PRESSURE: 156 MMHG | DIASTOLIC BLOOD PRESSURE: 83 MMHG

## 2025-04-10 DIAGNOSIS — R00.2 PALPITATIONS: ICD-10-CM

## 2025-04-10 DIAGNOSIS — I10 ESSENTIAL (PRIMARY) HYPERTENSION: ICD-10-CM

## 2025-04-10 PROCEDURE — 99214 OFFICE O/P EST MOD 30 MIN: CPT

## 2025-04-10 PROCEDURE — G2211 COMPLEX E/M VISIT ADD ON: CPT

## 2025-04-10 PROCEDURE — 93000 ELECTROCARDIOGRAM COMPLETE: CPT

## 2025-04-10 RX ORDER — LOSARTAN POTASSIUM AND HYDROCHLOROTHIAZIDE 12.5; 5 MG/1; MG/1
50-12.5 TABLET ORAL DAILY
Qty: 90 | Refills: 3 | Status: ACTIVE | COMMUNITY
Start: 2025-04-10 | End: 1900-01-01

## 2025-04-18 ENCOUNTER — NON-APPOINTMENT (OUTPATIENT)
Age: 65
End: 2025-04-18

## 2025-04-18 ENCOUNTER — APPOINTMENT (OUTPATIENT)
Dept: GASTROENTEROLOGY | Facility: CLINIC | Age: 65
End: 2025-04-18
Payer: MEDICARE

## 2025-04-18 ENCOUNTER — APPOINTMENT (OUTPATIENT)
Dept: CARDIOLOGY | Facility: CLINIC | Age: 65
End: 2025-04-18

## 2025-04-18 ENCOUNTER — APPOINTMENT (OUTPATIENT)
Dept: INTERNAL MEDICINE | Facility: CLINIC | Age: 65
End: 2025-04-18

## 2025-04-18 VITALS
DIASTOLIC BLOOD PRESSURE: 79 MMHG | BODY MASS INDEX: 22.98 KG/M2 | TEMPERATURE: 97.8 F | HEART RATE: 99 BPM | WEIGHT: 114 LBS | OXYGEN SATURATION: 98 % | SYSTOLIC BLOOD PRESSURE: 159 MMHG | HEIGHT: 59 IN

## 2025-04-18 PROCEDURE — 99213 OFFICE O/P EST LOW 20 MIN: CPT

## 2025-04-24 PROCEDURE — 93244 EXT ECG>48HR<7D REV&INTERPJ: CPT

## 2025-04-28 ENCOUNTER — APPOINTMENT (OUTPATIENT)
Dept: CARDIOLOGY | Facility: CLINIC | Age: 65
End: 2025-04-28

## 2025-04-29 ENCOUNTER — APPOINTMENT (OUTPATIENT)
Dept: CARDIOLOGY | Facility: CLINIC | Age: 65
End: 2025-04-29

## 2025-05-08 ENCOUNTER — NON-APPOINTMENT (OUTPATIENT)
Age: 65
End: 2025-05-08

## 2025-05-08 ENCOUNTER — APPOINTMENT (OUTPATIENT)
Dept: CARDIOLOGY | Facility: CLINIC | Age: 65
End: 2025-05-08
Payer: MEDICARE

## 2025-05-08 VITALS
WEIGHT: 114 LBS | OXYGEN SATURATION: 98 % | HEIGHT: 59 IN | SYSTOLIC BLOOD PRESSURE: 133 MMHG | DIASTOLIC BLOOD PRESSURE: 79 MMHG | HEART RATE: 89 BPM | BODY MASS INDEX: 22.98 KG/M2

## 2025-05-08 DIAGNOSIS — K21.9 GASTRO-ESOPHAGEAL REFLUX DISEASE W/OUT ESOPHAGITIS: ICD-10-CM

## 2025-05-08 DIAGNOSIS — E78.5 HYPERLIPIDEMIA, UNSPECIFIED: ICD-10-CM

## 2025-05-08 DIAGNOSIS — R42 DIZZINESS AND GIDDINESS: ICD-10-CM

## 2025-05-08 DIAGNOSIS — I10 ESSENTIAL (PRIMARY) HYPERTENSION: ICD-10-CM

## 2025-05-08 DIAGNOSIS — I49.1 ATRIAL PREMATURE DEPOLARIZATION: ICD-10-CM

## 2025-05-08 PROCEDURE — 99214 OFFICE O/P EST MOD 30 MIN: CPT

## 2025-05-08 PROCEDURE — 93000 ELECTROCARDIOGRAM COMPLETE: CPT

## 2025-05-08 PROCEDURE — G2211 COMPLEX E/M VISIT ADD ON: CPT

## 2025-05-21 ENCOUNTER — APPOINTMENT (OUTPATIENT)
Dept: CARDIOLOGY | Facility: CLINIC | Age: 65
End: 2025-05-21

## 2025-05-21 ENCOUNTER — OUTPATIENT (OUTPATIENT)
Dept: OUTPATIENT SERVICES | Facility: HOSPITAL | Age: 65
LOS: 1 days | End: 2025-05-21

## 2025-05-21 ENCOUNTER — INPATIENT (INPATIENT)
Facility: HOSPITAL | Age: 65
LOS: 1 days | Discharge: HOME HEALTH | DRG: 149 | End: 2025-05-23
Attending: STUDENT IN AN ORGANIZED HEALTH CARE EDUCATION/TRAINING PROGRAM | Admitting: STUDENT IN AN ORGANIZED HEALTH CARE EDUCATION/TRAINING PROGRAM
Payer: MEDICARE

## 2025-05-21 ENCOUNTER — APPOINTMENT (OUTPATIENT)
Dept: INTERNAL MEDICINE | Facility: CLINIC | Age: 65
End: 2025-05-21

## 2025-05-21 VITALS
SYSTOLIC BLOOD PRESSURE: 171 MMHG | DIASTOLIC BLOOD PRESSURE: 78 MMHG | HEART RATE: 96 BPM | WEIGHT: 119.93 LBS | TEMPERATURE: 99 F | HEIGHT: 60 IN | OXYGEN SATURATION: 98 % | RESPIRATION RATE: 16 BRPM

## 2025-05-21 DIAGNOSIS — Z98.49 CATARACT EXTRACTION STATUS, UNSPECIFIED EYE: Chronic | ICD-10-CM

## 2025-05-21 DIAGNOSIS — Z98.891 HISTORY OF UTERINE SCAR FROM PREVIOUS SURGERY: Chronic | ICD-10-CM

## 2025-05-21 DIAGNOSIS — Z98.890 OTHER SPECIFIED POSTPROCEDURAL STATES: Chronic | ICD-10-CM

## 2025-05-21 DIAGNOSIS — R42 DIZZINESS AND GIDDINESS: ICD-10-CM

## 2025-05-21 LAB
ALBUMIN SERPL ELPH-MCNC: 3.3 G/DL — SIGNIFICANT CHANGE UP (ref 3.3–5)
ALP SERPL-CCNC: 59 U/L — SIGNIFICANT CHANGE UP (ref 40–120)
ALT FLD-CCNC: 15 U/L — SIGNIFICANT CHANGE UP (ref 10–45)
ANION GAP SERPL CALC-SCNC: 15 MMOL/L — SIGNIFICANT CHANGE UP (ref 5–17)
APPEARANCE UR: CLEAR — SIGNIFICANT CHANGE UP
APTT BLD: 33.6 SEC — SIGNIFICANT CHANGE UP (ref 26.1–36.8)
AST SERPL-CCNC: 20 U/L — SIGNIFICANT CHANGE UP (ref 10–40)
BACTERIA # UR AUTO: NEGATIVE /HPF — SIGNIFICANT CHANGE UP
BASOPHILS # BLD AUTO: 0.04 K/UL — SIGNIFICANT CHANGE UP (ref 0–0.2)
BASOPHILS NFR BLD AUTO: 1 % — SIGNIFICANT CHANGE UP (ref 0–2)
BILIRUB SERPL-MCNC: 0.6 MG/DL — SIGNIFICANT CHANGE UP (ref 0.2–1.2)
BILIRUB UR-MCNC: NEGATIVE — SIGNIFICANT CHANGE UP
BUN SERPL-MCNC: 9 MG/DL — SIGNIFICANT CHANGE UP (ref 7–23)
CALCIUM SERPL-MCNC: 7.8 MG/DL — LOW (ref 8.4–10.5)
CAST: 0 /LPF — SIGNIFICANT CHANGE UP (ref 0–4)
CHLORIDE SERPL-SCNC: 108 MMOL/L — SIGNIFICANT CHANGE UP (ref 96–108)
CK MB CFR SERPL CALC: <1 NG/ML — SIGNIFICANT CHANGE UP (ref 0–3.8)
CO2 SERPL-SCNC: 17 MMOL/L — LOW (ref 22–31)
COLOR SPEC: YELLOW — SIGNIFICANT CHANGE UP
CREAT ?TM UR-MCNC: 27 MG/DL — SIGNIFICANT CHANGE UP
CREAT SERPL-MCNC: 0.4 MG/DL — LOW (ref 0.5–1.3)
DIFF PNL FLD: ABNORMAL
EGFR: 110 ML/MIN/1.73M2 — SIGNIFICANT CHANGE UP
EGFR: 110 ML/MIN/1.73M2 — SIGNIFICANT CHANGE UP
EOSINOPHIL # BLD AUTO: 0.01 K/UL — SIGNIFICANT CHANGE UP (ref 0–0.5)
EOSINOPHIL NFR BLD AUTO: 0.2 % — SIGNIFICANT CHANGE UP (ref 0–6)
GLUCOSE SERPL-MCNC: 101 MG/DL — HIGH (ref 70–99)
GLUCOSE UR QL: NEGATIVE MG/DL — SIGNIFICANT CHANGE UP
HCT VFR BLD CALC: 38.5 % — SIGNIFICANT CHANGE UP (ref 34.5–45)
HGB BLD-MCNC: 12.9 G/DL — SIGNIFICANT CHANGE UP (ref 11.5–15.5)
IMM GRANULOCYTES NFR BLD AUTO: 0.2 % — SIGNIFICANT CHANGE UP (ref 0–0.9)
INR BLD: 1.02 RATIO — SIGNIFICANT CHANGE UP (ref 0.85–1.16)
KETONES UR QL: 15 MG/DL
LEUKOCYTE ESTERASE UR-ACNC: ABNORMAL
LYMPHOCYTES # BLD AUTO: 0.88 K/UL — LOW (ref 1–3.3)
LYMPHOCYTES # BLD AUTO: 21.1 % — SIGNIFICANT CHANGE UP (ref 13–44)
MCHC RBC-ENTMCNC: 30.6 PG — SIGNIFICANT CHANGE UP (ref 27–34)
MCHC RBC-ENTMCNC: 33.5 G/DL — SIGNIFICANT CHANGE UP (ref 32–36)
MCV RBC AUTO: 91.4 FL — SIGNIFICANT CHANGE UP (ref 80–100)
MONOCYTES # BLD AUTO: 0.37 K/UL — SIGNIFICANT CHANGE UP (ref 0–0.9)
MONOCYTES NFR BLD AUTO: 8.9 % — SIGNIFICANT CHANGE UP (ref 2–14)
NEUTROPHILS # BLD AUTO: 2.87 K/UL — SIGNIFICANT CHANGE UP (ref 1.8–7.4)
NEUTROPHILS NFR BLD AUTO: 68.6 % — SIGNIFICANT CHANGE UP (ref 43–77)
NITRITE UR-MCNC: NEGATIVE — SIGNIFICANT CHANGE UP
NRBC BLD AUTO-RTO: 0 /100 WBCS — SIGNIFICANT CHANGE UP (ref 0–0)
OSMOLALITY UR: 297 MOS/KG — LOW (ref 300–900)
PH UR: 7 — SIGNIFICANT CHANGE UP (ref 5–8)
PLATELET # BLD AUTO: 243 K/UL — SIGNIFICANT CHANGE UP (ref 150–400)
POTASSIUM SERPL-MCNC: 2.8 MMOL/L — CRITICAL LOW (ref 3.5–5.3)
POTASSIUM SERPL-SCNC: 2.8 MMOL/L — CRITICAL LOW (ref 3.5–5.3)
POTASSIUM UR-SCNC: 10 MMOL/L — SIGNIFICANT CHANGE UP
PROT ?TM UR-MCNC: <7 MG/DL — SIGNIFICANT CHANGE UP (ref 0–12)
PROT SERPL-MCNC: 5.9 G/DL — LOW (ref 6–8.3)
PROT UR-MCNC: NEGATIVE MG/DL — SIGNIFICANT CHANGE UP
PROT/CREAT UR-RTO: SIGNIFICANT CHANGE UP (ref 0–0.2)
PROTHROM AB SERPL-ACNC: 11.7 SEC — SIGNIFICANT CHANGE UP (ref 9.9–13.4)
RBC # BLD: 4.21 M/UL — SIGNIFICANT CHANGE UP (ref 3.8–5.2)
RBC # FLD: 13.2 % — SIGNIFICANT CHANGE UP (ref 10.3–14.5)
RBC CASTS # UR COMP ASSIST: 2 /HPF — SIGNIFICANT CHANGE UP (ref 0–4)
REVIEW: SIGNIFICANT CHANGE UP
SODIUM SERPL-SCNC: 140 MMOL/L — SIGNIFICANT CHANGE UP (ref 135–145)
SODIUM UR-SCNC: 104 MMOL/L — SIGNIFICANT CHANGE UP
SP GR SPEC: 1.01 — SIGNIFICANT CHANGE UP (ref 1–1.03)
SQUAMOUS # UR AUTO: 1 /HPF — SIGNIFICANT CHANGE UP (ref 0–5)
TROPONIN T, HIGH SENSITIVITY RESULT: 7 NG/L — SIGNIFICANT CHANGE UP (ref 0–51)
UROBILINOGEN FLD QL: 0.2 MG/DL — SIGNIFICANT CHANGE UP (ref 0.2–1)
WBC # BLD: 4.18 K/UL — SIGNIFICANT CHANGE UP (ref 3.8–10.5)
WBC # FLD AUTO: 4.18 K/UL — SIGNIFICANT CHANGE UP (ref 3.8–10.5)
WBC UR QL: 5 /HPF — SIGNIFICANT CHANGE UP (ref 0–5)

## 2025-05-21 PROCEDURE — 70498 CT ANGIOGRAPHY NECK: CPT | Mod: 26

## 2025-05-21 PROCEDURE — 99285 EMERGENCY DEPT VISIT HI MDM: CPT

## 2025-05-21 PROCEDURE — 99214 OFFICE O/P EST MOD 30 MIN: CPT | Mod: 95

## 2025-05-21 PROCEDURE — G2211 COMPLEX E/M VISIT ADD ON: CPT | Mod: 95

## 2025-05-21 PROCEDURE — 70496 CT ANGIOGRAPHY HEAD: CPT | Mod: 26

## 2025-05-21 PROCEDURE — 93010 ELECTROCARDIOGRAM REPORT: CPT

## 2025-05-21 RX ORDER — METOCLOPRAMIDE HCL 10 MG
10 TABLET ORAL ONCE
Refills: 0 | Status: COMPLETED | OUTPATIENT
Start: 2025-05-21 | End: 2025-05-21

## 2025-05-21 RX ORDER — DIAZEPAM 5 MG/1
2 TABLET ORAL ONCE
Refills: 0 | Status: DISCONTINUED | OUTPATIENT
Start: 2025-05-21 | End: 2025-05-21

## 2025-05-21 RX ORDER — MECLIZINE HCL 12.5 MG
25 TABLET ORAL ONCE
Refills: 0 | Status: COMPLETED | OUTPATIENT
Start: 2025-05-21 | End: 2025-05-21

## 2025-05-21 RX ADMIN — Medication 10 MILLIGRAM(S): at 20:54

## 2025-05-21 RX ADMIN — Medication 500 MILLILITER(S): at 20:20

## 2025-05-21 RX ADMIN — Medication 40 MILLIEQUIVALENT(S): at 20:14

## 2025-05-21 RX ADMIN — Medication 50 MILLILITER(S): at 21:21

## 2025-05-21 RX ADMIN — Medication 25 MILLIGRAM(S): at 18:52

## 2025-05-21 RX ADMIN — DIAZEPAM 2 MILLIGRAM(S): 5 TABLET ORAL at 21:24

## 2025-05-21 RX ADMIN — Medication 100 MILLIEQUIVALENT(S): at 20:15

## 2025-05-21 RX ADMIN — Medication 100 MILLIEQUIVALENT(S): at 21:53

## 2025-05-21 RX ADMIN — Medication 100 MILLIEQUIVALENT(S): at 23:27

## 2025-05-21 NOTE — ED PROVIDER NOTE - CLINICAL SUMMARY MEDICAL DECISION MAKING FREE TEXT BOX
Attending MD Ji: Patient presented to the emergency department with complaints of severe dizziness that began yesterday morning. She reports that she has a history of vertigo but states this episode is significantly worse than her previous experiences. The dizziness worsened this morning. She describes the sensation as the room spinning with any movement, which resolves only when sitting still and focusing on one spot. Patient also reports experiencing a brief episode of chest tightness this morning while brushing her teeth, lasting approximately 5-6 seconds, which has not recurred. She notes a dull occipital headache that began either last night or this morning. Patient denies any numbness, tingling, or weakness in extremities. She denies any vision changes, speech changes, or hearing loss. Patient reports occasional neck pain that comes and goes with this current episode of vertigo.    Past Medical History (may not be comprehensive): Hyperthyroidism, GERD, Osteoarthritis, History of cancer with chemotherapy 5 years ago, Hypertension, History of vertigo    Allergies: No known medication allergies    Medication History (may not be comprehensive): Patient reports taking Diazepam (Valium) as needed for tremors, prescribed by Dr. Kevin    Social History:     Review of Systems:  - Constitutional symptoms: Denies fever, chills  - Cardiovascular: Reports brief episode of chest tightness this morning lasting 5-6 seconds  - Neurological: Reports severe vertigo with any movement, tremor from neck to jaw that occurs with stress, anxiety, and sleep deprivation (began during chemotherapy 5 years ago and occurs 1-2 times per year)  - Psychiatric: Reports anxiety that worsens tremors    Patient's vital signs are nonactionable, she is sitting up in the stretcher no apparent acute distress.  Breathing comfortably on room air.  Awake and alert. Affect appropriate. Oriented x 3.  Speech is fluent without dysarthria. Symmetric eyebrow raise, symmetric eyelid closure. PERRL b/l, EOMI b/l, no spontaneous nystagmus, symmetric smile, tongue midline.  5/5  strength bilaterally, 5/5 elbow flexion bilaterally, 5/5 elbow extension bilaterally, 5/5 shoulder shrug b/l.  5/5 plantar and dorsiflexion b/l, 5/5 knee flexion and extension b/l, 5/5 hip flexion and extension b/l. Sensation intact and symmetric grossly to light touch throughout face and bilateral upper and lower extremities,  Finger to nose normal bilaterally.  Gait deferred for now but will reassess.    ECG recorded at 6:17 PM independently interpreted by me , Dr Harsha Ji,  at 659 shows normal sinus rhythm normal axis normal intervals no acute diagnostic ischemic ST-T changes    Patient is presenting for evaluation of dizziness described as spinning sensation episodic/provoked with movements, likely consistent with vertigo.  Overall suspect that this is peripheral in nature and not likely central given absence of other red flag neurologic symptoms and grossly nonfocal neurologic exam.  Patient did report some chest pressure that was transient earlier in the day, will obtain cardiac biomarkers to assess for cardiac ischemia but low suspicion.  Will provide meclizine for symptomatic treatment and reassess.  Will need to perform gait assessment for patient, if patient has significant difficulty with ambulation would reconsider possible central etiology of presenting dizziness but overall more suspicious for peripheral etiology at this time.          *The above represents an initial assessment/impression. Please refer to progress notes for potential changes in patient clinical course*

## 2025-05-21 NOTE — ED ADULT NURSE NOTE - OBJECTIVE STATEMENT
Pt is a 65 y/o female with PMH vertigo, HTN, HLD, hypothyroidism presenting to the ED by EMS from home for episodes of dizziness with nausea and intermittent headache since yesterday. Pt reports this morning she had episode of chest tightness lasting "seconds" and self resolved. Pt states dizziness is worsened with any movement of the head, denies associated vomiting, changes in vision, sob, falls.

## 2025-05-21 NOTE — ED PROVIDER NOTE - PROGRESS NOTE DETAILS
Marcy Obrien MD, PGY3:  Reviewed results of labs and imaging, patient with hypokalemia receiving potassium resuscitation now, CTA head and neck shows no acute findings attempted eply maneuver twice with minimal improvement in her dizziness. Marcy Obrien MD, PGY3:  Reviewed results of labs and imaging, patient with hypokalemia receiving potassium resuscitation now, CTA head and neck shows no acute findings attempted eply maneuver twice with minimal improvement in her dizziness. pt able to take a few steps without a wide based gait or weakness however pt is very symptomatic (room spinning dizziness) with any positional changes, neuro consulted for eval will come see pt Marcy Obrien MD, PGY3:  neuro recommends admission for MRI head w/wo IAC. pt agrees with plan. pt accepted for admission under medicine per hospitalist

## 2025-05-21 NOTE — ED ADULT NURSE NOTE - NSFALLRISKINTERV_ED_ALL_ED

## 2025-05-22 DIAGNOSIS — E87.6 HYPOKALEMIA: ICD-10-CM

## 2025-05-22 DIAGNOSIS — G25.0 ESSENTIAL TREMOR: ICD-10-CM

## 2025-05-22 DIAGNOSIS — E03.9 HYPOTHYROIDISM, UNSPECIFIED: ICD-10-CM

## 2025-05-22 DIAGNOSIS — R42 DIZZINESS AND GIDDINESS: ICD-10-CM

## 2025-05-22 DIAGNOSIS — E78.5 HYPERLIPIDEMIA, UNSPECIFIED: ICD-10-CM

## 2025-05-22 DIAGNOSIS — M81.0 AGE-RELATED OSTEOPOROSIS WITHOUT CURRENT PATHOLOGICAL FRACTURE: ICD-10-CM

## 2025-05-22 DIAGNOSIS — I10 ESSENTIAL (PRIMARY) HYPERTENSION: ICD-10-CM

## 2025-05-22 DIAGNOSIS — K21.9 GASTRO-ESOPHAGEAL REFLUX DISEASE WITHOUT ESOPHAGITIS: ICD-10-CM

## 2025-05-22 LAB
A1C WITH ESTIMATED AVERAGE GLUCOSE RESULT: 6.4 % — HIGH (ref 4–5.6)
ADD ON TEST-SPECIMEN IN LAB: SIGNIFICANT CHANGE UP
ALBUMIN SERPL ELPH-MCNC: 4.2 G/DL — SIGNIFICANT CHANGE UP (ref 3.3–5)
ALP SERPL-CCNC: 71 U/L — SIGNIFICANT CHANGE UP (ref 40–120)
ALT FLD-CCNC: 18 U/L — SIGNIFICANT CHANGE UP (ref 10–45)
ANION GAP SERPL CALC-SCNC: 13 MMOL/L — SIGNIFICANT CHANGE UP (ref 5–17)
ANION GAP SERPL CALC-SCNC: 15 MMOL/L — SIGNIFICANT CHANGE UP (ref 5–17)
AST SERPL-CCNC: 20 U/L — SIGNIFICANT CHANGE UP (ref 10–40)
BILIRUB SERPL-MCNC: 0.7 MG/DL — SIGNIFICANT CHANGE UP (ref 0.2–1.2)
BUN SERPL-MCNC: 9 MG/DL — SIGNIFICANT CHANGE UP (ref 7–23)
BUN SERPL-MCNC: 9 MG/DL — SIGNIFICANT CHANGE UP (ref 7–23)
CALCIUM SERPL-MCNC: 9.3 MG/DL — SIGNIFICANT CHANGE UP (ref 8.4–10.5)
CALCIUM SERPL-MCNC: 9.5 MG/DL — SIGNIFICANT CHANGE UP (ref 8.4–10.5)
CALCIUM SERPL-MCNC: 9.6 MG/DL — SIGNIFICANT CHANGE UP (ref 8.4–10.5)
CHLORIDE SERPL-SCNC: 102 MMOL/L — SIGNIFICANT CHANGE UP (ref 96–108)
CHLORIDE SERPL-SCNC: 103 MMOL/L — SIGNIFICANT CHANGE UP (ref 96–108)
CHOLEST SERPL-MCNC: 182 MG/DL — SIGNIFICANT CHANGE UP
CO2 SERPL-SCNC: 22 MMOL/L — SIGNIFICANT CHANGE UP (ref 22–31)
CO2 SERPL-SCNC: 22 MMOL/L — SIGNIFICANT CHANGE UP (ref 22–31)
CREAT SERPL-MCNC: 0.49 MG/DL — LOW (ref 0.5–1.3)
CREAT SERPL-MCNC: 0.54 MG/DL — SIGNIFICANT CHANGE UP (ref 0.5–1.3)
EGFR: 102 ML/MIN/1.73M2 — SIGNIFICANT CHANGE UP
EGFR: 102 ML/MIN/1.73M2 — SIGNIFICANT CHANGE UP
EGFR: 105 ML/MIN/1.73M2 — SIGNIFICANT CHANGE UP
EGFR: 105 ML/MIN/1.73M2 — SIGNIFICANT CHANGE UP
ESTIMATED AVERAGE GLUCOSE: 137 MG/DL — HIGH (ref 68–114)
GLUCOSE SERPL-MCNC: 107 MG/DL — HIGH (ref 70–99)
GLUCOSE SERPL-MCNC: 88 MG/DL — SIGNIFICANT CHANGE UP (ref 70–99)
HCT VFR BLD CALC: 42.4 % — SIGNIFICANT CHANGE UP (ref 34.5–45)
HDLC SERPL-MCNC: 78 MG/DL — SIGNIFICANT CHANGE UP
HGB BLD-MCNC: 14 G/DL — SIGNIFICANT CHANGE UP (ref 11.5–15.5)
LDLC SERPL-MCNC: 92 MG/DL — SIGNIFICANT CHANGE UP
LIPID PNL WITH DIRECT LDL SERPL: 92 MG/DL — SIGNIFICANT CHANGE UP
MCHC RBC-ENTMCNC: 29.7 PG — SIGNIFICANT CHANGE UP (ref 27–34)
MCHC RBC-ENTMCNC: 33 G/DL — SIGNIFICANT CHANGE UP (ref 32–36)
MCV RBC AUTO: 89.8 FL — SIGNIFICANT CHANGE UP (ref 80–100)
NONHDLC SERPL-MCNC: 104 MG/DL — SIGNIFICANT CHANGE UP
NRBC BLD AUTO-RTO: 0 /100 WBCS — SIGNIFICANT CHANGE UP (ref 0–0)
PLATELET # BLD AUTO: 279 K/UL — SIGNIFICANT CHANGE UP (ref 150–400)
POTASSIUM SERPL-MCNC: 3.8 MMOL/L — SIGNIFICANT CHANGE UP (ref 3.5–5.3)
POTASSIUM SERPL-MCNC: 4.1 MMOL/L — SIGNIFICANT CHANGE UP (ref 3.5–5.3)
POTASSIUM SERPL-SCNC: 3.8 MMOL/L — SIGNIFICANT CHANGE UP (ref 3.5–5.3)
POTASSIUM SERPL-SCNC: 4.1 MMOL/L — SIGNIFICANT CHANGE UP (ref 3.5–5.3)
PROT SERPL-MCNC: 7.3 G/DL — SIGNIFICANT CHANGE UP (ref 6–8.3)
PTH-INTACT FLD-MCNC: 66 PG/ML — HIGH (ref 15–65)
RBC # BLD: 4.72 M/UL — SIGNIFICANT CHANGE UP (ref 3.8–5.2)
RBC # FLD: 13.3 % — SIGNIFICANT CHANGE UP (ref 10.3–14.5)
SODIUM SERPL-SCNC: 138 MMOL/L — SIGNIFICANT CHANGE UP (ref 135–145)
SODIUM SERPL-SCNC: 139 MMOL/L — SIGNIFICANT CHANGE UP (ref 135–145)
T4 FREE SERPL-MCNC: 1.1 NG/DL — SIGNIFICANT CHANGE UP (ref 0.9–1.8)
TRIGL SERPL-MCNC: 60 MG/DL — SIGNIFICANT CHANGE UP
TSH SERPL-MCNC: 5.89 UIU/ML — HIGH (ref 0.27–4.2)
UUN UR-MCNC: 203 MG/DL — SIGNIFICANT CHANGE UP
WBC # BLD: 5.76 K/UL — SIGNIFICANT CHANGE UP (ref 3.8–10.5)
WBC # FLD AUTO: 5.76 K/UL — SIGNIFICANT CHANGE UP (ref 3.8–10.5)

## 2025-05-22 PROCEDURE — 70553 MRI BRAIN STEM W/O & W/DYE: CPT | Mod: 26

## 2025-05-22 PROCEDURE — 99223 1ST HOSP IP/OBS HIGH 75: CPT

## 2025-05-22 RX ORDER — MELATONIN 5 MG
3 TABLET ORAL AT BEDTIME
Refills: 0 | Status: DISCONTINUED | OUTPATIENT
Start: 2025-05-22 | End: 2025-05-23

## 2025-05-22 RX ORDER — ATORVASTATIN CALCIUM 80 MG/1
1 TABLET, FILM COATED ORAL
Refills: 0 | DISCHARGE

## 2025-05-22 RX ORDER — LOSARTAN POTASSIUM AND HYDROCHLOROTHIAZIDE 12.5; 5 MG/1; MG/1
1 TABLET ORAL
Refills: 0 | DISCHARGE

## 2025-05-22 RX ORDER — LEVOTHYROXINE SODIUM 300 MCG
50 TABLET ORAL DAILY
Refills: 0 | Status: DISCONTINUED | OUTPATIENT
Start: 2025-05-22 | End: 2025-05-23

## 2025-05-22 RX ORDER — ATORVASTATIN CALCIUM 80 MG/1
40 TABLET, FILM COATED ORAL AT BEDTIME
Refills: 0 | Status: DISCONTINUED | OUTPATIENT
Start: 2025-05-22 | End: 2025-05-23

## 2025-05-22 RX ORDER — ONDANSETRON HCL/PF 4 MG/2 ML
4 VIAL (ML) INJECTION EVERY 6 HOURS
Refills: 0 | Status: DISCONTINUED | OUTPATIENT
Start: 2025-05-22 | End: 2025-05-23

## 2025-05-22 RX ORDER — ASPIRIN 325 MG
81 TABLET ORAL DAILY
Refills: 0 | Status: DISCONTINUED | OUTPATIENT
Start: 2025-05-22 | End: 2025-05-23

## 2025-05-22 RX ORDER — LEVOTHYROXINE SODIUM 300 MCG
1 TABLET ORAL
Refills: 0 | DISCHARGE

## 2025-05-22 RX ORDER — ALENDRONATE SODIUM 70 MG/1
1 TABLET ORAL
Refills: 0 | DISCHARGE

## 2025-05-22 RX ORDER — MECLIZINE HCL 12.5 MG
25 TABLET ORAL EVERY 6 HOURS
Refills: 0 | Status: DISCONTINUED | OUTPATIENT
Start: 2025-05-22 | End: 2025-05-23

## 2025-05-22 RX ORDER — MECLIZINE HCL 12.5 MG
25 TABLET ORAL ONCE
Refills: 0 | Status: COMPLETED | OUTPATIENT
Start: 2025-05-22 | End: 2025-05-22

## 2025-05-22 RX ORDER — ACETAMINOPHEN 500 MG/5ML
650 LIQUID (ML) ORAL EVERY 6 HOURS
Refills: 0 | Status: DISCONTINUED | OUTPATIENT
Start: 2025-05-22 | End: 2025-05-23

## 2025-05-22 RX ORDER — LOSARTAN POTASSIUM 100 MG/1
50 TABLET, FILM COATED ORAL DAILY
Refills: 0 | Status: DISCONTINUED | OUTPATIENT
Start: 2025-05-22 | End: 2025-05-23

## 2025-05-22 RX ORDER — ENOXAPARIN SODIUM 100 MG/ML
40 INJECTION SUBCUTANEOUS EVERY 24 HOURS
Refills: 0 | Status: DISCONTINUED | OUTPATIENT
Start: 2025-05-22 | End: 2025-05-23

## 2025-05-22 RX ADMIN — Medication 25 MILLIGRAM(S): at 13:48

## 2025-05-22 RX ADMIN — Medication 25 MILLIGRAM(S): at 03:44

## 2025-05-22 RX ADMIN — Medication 81 MILLIGRAM(S): at 13:43

## 2025-05-22 RX ADMIN — ATORVASTATIN CALCIUM 40 MILLIGRAM(S): 80 TABLET, FILM COATED ORAL at 22:16

## 2025-05-22 RX ADMIN — Medication 25 MILLIGRAM(S): at 23:45

## 2025-05-22 RX ADMIN — ENOXAPARIN SODIUM 40 MILLIGRAM(S): 100 INJECTION SUBCUTANEOUS at 06:28

## 2025-05-22 NOTE — H&P ADULT - HIV OFFER
Pt is scheduled for microlaryngoscopy, bronchoscopy, tracheoscopy, tracheostoma revision, trach change, bilateral myringotomy and tubes, auditory brainstem response test with Dr. Holt; esophagogastroduodenoscopy with biopsies with Dr. Murphy; Dr. Rutledge to add codes on 10/5/2022 at [facility] Opt out Pt is scheduled for microlaryngoscopy, bronchoscopy, tracheoscopy, tracheostoma revision, trach change, bilateral myringotomy and tubes, auditory brainstem response test with Dr. Holt; esophagogastroduodenoscopy with biopsies with Dr. Murphy; flexible bronchoscopy with lavage with Dr. Rutledge to add codes on 10/5/2022 at [facility]

## 2025-05-22 NOTE — CONSULT NOTE ADULT - ATTENDING COMMENTS
DOS 5/22  Elia     64y RIGHT-handed woman, with PMH significant for vertigo, HTN, Hodgkin lymphoma (2000, in remission), prediabetes, osteoporosis, who presents to [facility] ED on 5/21/2025, with c/o room-spinning dizziness. Neurology is consulted for dizziness. Patient has undergone unremarkable CTH, CTA H/N. +IVF, meclizine, diazepam, metoclopramide. Epley maneuver was reportedly performed twice by ED physician. Went to bed in her USOH on Monday, 5/19/2025, at 20:00. Awoke on Tuesday, 5/20 with vertigo. Describes it as true room-spinning dizziness. Worse when standing, movement. There all the time - not intermittent. Has had before, but not this severe. Says vestibular rehabilitation helped. Never seen a neurologist or ENT. No changes in hearing, pain in ears. No rashes, bug bites, recent travel. Currently denies HA/neck pain. Did have a cough for "1.5 months," but resolved several days ago. Denies history of clinical stroke/MI. Takes aspirin 81mg daily. Says she doesn't know why. Was hospitalized in [place] on 3/9/2025, for HTN. Says it was started then. Also takes atorvastatin, losartan-HCTZ (the likely culprit of her hypokalemia), levothyroxine, bisphosphonate. Remote history of HL, in remission. Follows with survivorship clinic. No AD at baseline. Can climb stairs, but says she has pulmonary fibrosis from chemotherapy, which leaves her "gasping for air." Does not drive. Denies tobacco/alcohol/recreational drug use. Lives with her . Retired .  NO tenecteplase d/t outside therapeutic window.  NO thrombectomy d/t no ELVO.  ED vitals notable for: afebrile, HR 80s-90s, BP to 171/78, RR 16-18, SpO2 98% on RA. Labs notable for: K 2.8->3.8, glucose 101/107, UA grossly unremarkable. CT imaging disclosed: grossly unremarkable. Exam notable for nonfocal neurologic exam with NO appendicular ataxia; HINTS exam is equivocal - no nystagmus, TOS is negative, HIT without corrective saccade.  NIHSS on admission: 0  LKN: 5/19/2025, 20:00  Pre-mRS: 1  mildly tremulous on exma     Impression: Acute vestibular syndrome. Can localize to the cerebellum/brainstem vs. peripheral. R/o acute ischemic stroke. Mechanism if stroke: pending workup. Given persistent, severe symptoms despite treatment (including inability to ambulate) and equivocal HINTS exam - would obtain inpatient MRI brain.    Recommendations:    - Dysphagia screen per core measures  - Orthostatic vital signs if not already performed  - BP goal up to 180/110 x 24 hours, then OK for normotension <140/90  - Continue home aspirin 81mg daily  - Continue home statin  - A1c, lipid panel  - consider EEG in or outpatient. tremulous.  possible physiologic   - check NH3   - MRI brain w/wo contrast (IAC protocol in comments)  - TTE without bubble (if not performed recently)  - Stroke neurochecks, vitals q4h  - Fall and aspiration precautions  - PT/OT as appropriate  - Stroke education provided  - Smoking cessation education not needed - non-smoker  - Upon discharge, patient can follow up with Dr. Karan Cadena: [address], [place] [address]. [phone]. If vertigo is not caused by stroke - she will benefit from outpatient ENT evaluation and [facility] vestibular rehabilitation.   Karan Cadena MD  Vascular Neurology  Office: [phone]

## 2025-05-22 NOTE — PATIENT PROFILE ADULT - FALL HARM RISK - HARM RISK INTERVENTIONS
Assistance with ambulation/Assistance OOB with selected safe patient handling equipment/Communicate Risk of Fall with Harm to all staff/Monitor gait and stability/Reinforce activity limits and safety measures with patient and family/Sit up slowly, dangle for a short time, stand at bedside before walking/Tailored Fall Risk Interventions/Visual Cue: Yellow wristband and red socks/Bed in lowest position, wheels locked, appropriate side rails in place/Call bell, personal items and telephone in reach/Instruct patient to call for assistance before getting out of bed or chair/Non-slip footwear when patient is out of bed/[place] to call system/Physically safe environment - no spills, clutter or unnecessary equipment/Purposeful Proactive Rounding/Room/bathroom lighting operational, light cord in reach

## 2025-05-22 NOTE — H&P ADULT - PROBLEM SELECTOR PLAN 2
Patient with essential tremor that improved with diazepam. may be related to anxiety  - Restart PO propranolol (patient was previously on it) at 20mg BID  - Social work consult for anxiety/get patient therapist

## 2025-05-22 NOTE — H&P ADULT - ASSESSMENT
64F w/Hx of vertigo, tremor, Hodgkin lymphoma (2000, in remission), HTN, hypothyroidism, HLD, osteoporosis presents due to significant vertigo, nausea and tremors that began suddenly on awakening tuesday morning.

## 2025-05-22 NOTE — H&P ADULT - HISTORY OF PRESENT ILLNESS
64F w/Hx of vertigo, tremor, Hodgkin lymphoma (2000, in remission), HTN, hypothyroidism, HLD, osteoporosis presents due to significant vertigo, nausea and tremors that began suddenly on awakening Tuesday morning. Denies vomiting. Patient has had vertigo prior similar to this episode that self-resolved. Pt also reports tremors in b/l UE. Patient has had tremors for years, was previously on propranolol but that was discontinued when she started losartan/HCTZ for HTN. Tremors associated with anxiety and palpitations. Reports improvement of vertigo since arrival and receiving meclizine and diazepam. Diazepam has also improved tremors in past. Patient denies fever, chills, chest pain, SOB, abd pain, dysuria.

## 2025-05-22 NOTE — CHART NOTE - NSCHARTNOTEFT_GEN_A_CORE
Pt seen and examined, H+P reviewed from this AM     64F w/Hx of vertigo, tremor, Hodgkin lymphoma (2000, in remission), HTN, hypothyroidism, HLD, osteoporosis presents due to significant vertigo, nausea and tremors that began suddenly 5/20 AM.    Pt states she feels slightly better, symptoms aren't as bad as on admission; no n/v, was able to ambulate to bathroom earlier, states tremors are worse.     On exam - sleepy, but NAD, S1, S2, RRR, CTA b/l, no abd pain, soft/NT, neuro exam non focal   Labs WNL     Plan:  - neuro following - pending MRI head with IAC protocol  - pending TTE   - c/w meclizine   - c/t hold HCTZ   - PT eval pending Pt seen and examined, H+P reviewed from this AM     64F w/Hx of vertigo, tremor, Hodgkin lymphoma (2000, in remission), HTN, hypothyroidism, HLD, osteoporosis presents due to significant vertigo, nausea and tremors that began suddenly 5/20 AM.    Pt states she feels slightly better, symptoms aren't as bad as on admission; no n/v, was able to ambulate to bathroom earlier, states tremors are worse.     On exam - sleepy, but NAD, S1, S2, RRR, CTA b/l, no abd pain, soft/NT, neuro exam non focal   Labs WNL     Plan:  - neuro following - pending MRI head with IAC protocol  - pending TTE   - c/w meclizine   - c/t hold HCTZ   - TSH slightly elevated, check FT4  - PT eval pending

## 2025-05-22 NOTE — H&P ADULT - PROBLEM SELECTOR PLAN 1
Patient with vertigo refractory to meclizine, diazepam and epley maneuver  - CTA head, neck: Negative for acute pathology  - Neurology following, follow recs  - F/u TTE and MR head w/w/o contrast to r/o CVA  - PT  - Fall, aspiration risk  - Meclizine PRN  - Zofran PRN  - Neurochecks q4h

## 2025-05-22 NOTE — PATIENT PROFILE ADULT - NSTRANSFERBELONGINGSDISPO_GEN_A_NUR
Pupils equal, round and reactive to light, Extra-ocular movement intact, eyes are clear b/l with patient

## 2025-05-22 NOTE — CONSULT NOTE ADULT - SUBJECTIVE AND OBJECTIVE BOX
Neurology - Consult Note    -  Spectra: [phone] ([facility]), [phone] ([facility]). For new consults, please page: [phone] ([facility]), [phone] ([facility]).  -    HPI: Patient JOSÉ HEATON is a 64y (1960) ***-handed woman who presents to [facility] ED on 5/21/2025, with c/o room-spinning dizziness.    PMH significant for: vertigo, HTN, Hodgkin lymphoma, T2DM    Review of Systems:  INCOMPLETE   All other review of systems is negative unless indicated above.    Allergies:  latex (Other)  Sudafed 12-Hour (Other)      PMHx/PSHx/Family Hx: As above, otherwise see below   Hypertension    Abnormal Pap smear of cervix    Sleep apnea    Hodgkin lymphoma    Borderline diabetes        Social Hx:  Per HPI    Medications:  MEDICATIONS  (STANDING):  sodium chloride 0.9%. 1000 milliLiter(s) (50 mL/Hr) IV Continuous <Continuous>    MEDICATIONS  (PRN):      Vitals:  T(C): 37 (05-21-25 @ 17:58), Max: 37 (05-21-25 @ 17:58)  HR: 85 (05-21-25 @ 23:59) (85 - 96)  BP: 145/67 (05-21-25 @ 23:59) (145/67 - 171/78)  RR: 18 (05-21-25 @ 23:59) (16 - 18)  SpO2: 98% (05-21-25 @ 23:59) (98% - 98%)    Physical Examination: INCOMPLETE  General - Sitting up on ED cart  Cardiovascular - No LE edema  Eyes - Non-injected conjunctivae, anicteric sclerae    Neurologic Exam:  Mental status:  - Eyes open spontaneously, attends to examiner  - Oriented to: person, place, and time  - Speech: fluent  - Repetition and naming: intact   - Follows simple and cross commands   - Attention/concentration: intact  - Recent and remote memory (including registration and recall): registration intact, 3/3 on 3-word recall  - Fund of knowledge: intact    Cranial nerves - PERRL - no rAPD, VFF with finger counting, EOMI - no nystagmus, face sensation (V1-V3) intact b/l, facial strength intact without asymmetry b/l, hearing grossly intact, palate with symmetric elevation, shoulder shrug intact b/l, tongue midline on protrusion with full lateral movement  Dysarthria: ***    Motor - Normal bulk for age throughout. No pronator drift.  Strength testing (R/L)  Deltoid:  5/5  Biceps:  5/5        Triceps:  5/5       Wrist Extension:  5/5      Wrist Flexion:  5/5       Interossei:  5/5        :  5/5    ***FFM intact   ***No orbiting with forearm rolling    Hip Flexion:  5/5  Hip Extension:  5/5      Knee Flexion:  5/5      Knee Extension:  5/5      Dorsiflexion:  5/5      Plantar Flexion:  5/5    Sensation - Light touch intact throughout    DTRs (R/L)  Biceps:  2+/2+        Triceps:  2+/2+       Brachioradialis:  2+/2+        Patellar:  2+/2+      Ankle:  2+/2+    no ankle clonus  Plantar response:  Down/Down  **Deferred d/t focused neurologic exam    Coordination - FNF, HTS intact b/l. No tremors appreciated.    Gait and station - Unable to assess d/t fall risk/safety concerns.    Labs:                        12.9   4.18  )-----------( 243      ( 21 May 2025 18:56 )             38.5     05-21    140  |  108  |  9   ----------------------------<  101[H]  2.8[LL]   |  17[L]  |  0.40[L]    Ca    7.8[L]      21 May 2025 18:56    TPro  5.9[L]  /  Alb  3.3  /  TBili  0.6  /  DBili  x   /  AST  20  /  ALT  15  /  AlkPhos  59  05-21    CAPILLARY BLOOD GLUCOSE        LIVER FUNCTIONS - ( 21 May 2025 18:56 )  Alb: 3.3 g/dL / Pro: 5.9 g/dL / ALK PHOS: 59 U/L / ALT: 15 U/L / AST: 20 U/L / GGT: x             PT/INR - ( 21 May 2025 18:56 )   PT: 11.7 sec;   INR: 1.02 ratio         PTT - ( 21 May 2025 18:56 )  PTT:33.6 sec  CSF:                  Radiology:     Neurology - Consult Note    -  Spectra: [phone] ([facility]), [phone] ([facility]). For new consults, please page: [phone] ([facility]), [phone] ([facility]).  -    HPI: Patient JOSÉ HEATON is a 64y (1960) RIGHT-handed woman who presents to [facility] ED on 5/21/2025, with c/o room-spinning dizziness.    PMH significant for: vertigo, HTN, Hodgkin lymphoma (2000, in remission), T2DM    Review of Systems:  INCOMPLETE   All other review of systems is negative unless indicated above.    Allergies:  latex (Other)  Sudafed 12-Hour (Other)      PMHx/PSHx/Family Hx: As above, otherwise see below   Hypertension    Abnormal Pap smear of cervix    Sleep apnea    Hodgkin lymphoma    Borderline diabetes        Social Hx:  Per HPI    Medications:  MEDICATIONS  (STANDING):  sodium chloride 0.9%. 1000 milliLiter(s) (50 mL/Hr) IV Continuous <Continuous>    MEDICATIONS  (PRN):      Vitals:  T(C): 37 (05-21-25 @ 17:58), Max: 37 (05-21-25 @ 17:58)  HR: 85 (05-21-25 @ 23:59) (85 - 96)  BP: 145/67 (05-21-25 @ 23:59) (145/67 - 171/78)  RR: 18 (05-21-25 @ 23:59) (16 - 18)  SpO2: 98% (05-21-25 @ 23:59) (98% - 98%)    Physical Examination: INCOMPLETE  General - Sitting up on ED cart  Cardiovascular - No LE edema  Eyes - Non-injected conjunctivae, anicteric sclerae    Neurologic Exam:  Mental status:  - Eyes open spontaneously, attends to examiner  - Oriented to: person, place, and time  - Speech: fluent  - Repetition and naming: intact   - Follows simple and cross commands   - Attention/concentration: intact  - Recent and remote memory (including registration and recall): registration intact, 3/3 on 3-word recall  - Fund of knowledge: intact    Cranial nerves - PERRL - no rAPD, VFF with finger counting, EOMI - no nystagmus, face sensation (V1-V3) intact b/l, facial strength intact without asymmetry b/l, hearing grossly intact, palate with symmetric elevation, shoulder shrug intact b/l, tongue midline on protrusion with full lateral movement  Dysarthria: ***    Motor - Normal bulk for age throughout. No pronator drift.  Strength testing (R/L)  Deltoid:  5/5  Biceps:  5/5        Triceps:  5/5       Wrist Extension:  5/5      Wrist Flexion:  5/5       Interossei:  5/5        :  5/5    ***FFM intact   ***No orbiting with forearm rolling    Hip Flexion:  5/5  Hip Extension:  5/5      Knee Flexion:  5/5      Knee Extension:  5/5      Dorsiflexion:  5/5      Plantar Flexion:  5/5    Sensation - Light touch intact throughout    DTRs (R/L)  Biceps:  2+/2+        Triceps:  2+/2+       Brachioradialis:  2+/2+        Patellar:  2+/2+      Ankle:  2+/2+    no ankle clonus  Plantar response:  Down/Down  **Deferred d/t focused neurologic exam    Coordination - FNF, HTS intact b/l. No tremors appreciated.    Gait and station - Unable to assess d/t fall risk/safety concerns.    Labs:                        12.9   4.18  )-----------( 243      ( 21 May 2025 18:56 )             38.5     05-21    140  |  108  |  9   ----------------------------<  101[H]  2.8[LL]   |  17[L]  |  0.40[L]    Ca    7.8[L]      21 May 2025 18:56    TPro  5.9[L]  /  Alb  3.3  /  TBili  0.6  /  DBili  x   /  AST  20  /  ALT  15  /  AlkPhos  59  05-21    CAPILLARY BLOOD GLUCOSE        LIVER FUNCTIONS - ( 21 May 2025 18:56 )  Alb: 3.3 g/dL / Pro: 5.9 g/dL / ALK PHOS: 59 U/L / ALT: 15 U/L / AST: 20 U/L / GGT: x             PT/INR - ( 21 May 2025 18:56 )   PT: 11.7 sec;   INR: 1.02 ratio         PTT - ( 21 May 2025 18:56 )  PTT:33.6 sec  CSF:                  Radiology:  5/21/2025:  CT HEAD:  No acute intracranial hemorrhage, mass effect, or midline shift.    CTA NECK:  No evidence of significant stenosis, occlusion, or dissection.    CTA HEAD:  No large vessel occlusion or significant stenosis.  No evidence of aneurysm. Tiny aneurysms can be beyond the resolution of   CTA technique. Neurology - Consult Note    -  Spectra: [phone] ([facility]), [phone] ([facility]). For new consults, please page: [phone] ([facility]), [phone] ([facility]).  -    HPI: Patient JOSÉ HEATON is a 64y (1960) RIGHT-handed woman who presents to [facility] ED on 5/21/2025, with c/o room-spinning dizziness.    PMH significant for: vertigo, HTN, Hodgkin lymphoma (2000, in remission), prediabetes, osteoporosis    Neurology is consulted for dizziness. Patient has undergone unremarkable CTH, CTA H/N. +IVF, meclizine, diazepam, metoclopramide. Epley maneuver was reportedly performed twice by ED physician.    Patient accompanied by her . Went to bed in her USOH on Monday, 5/19/2025, at 20:00. Awoke on Tuesday, 5/20 with vertigo. Describes it as true room-spinning dizziness. Worse when standing, movement. There all the time - not intermittent. Has had before, but not this severe. Says vestibular rehabilitation helped. Never seen a neurologist or ENT. No changes in hearing, pain in ears. No rashes, bug bites, recent travel. Currently denies HA/neck pain. Did have a cough for "1.5 months," but resolved several days ago.     Denies history of clinical stroke/MI. Takes aspirin 81mg daily. Says she doesn't know why. Was hospitalized in [place] on 3/9/2025, for HTN. Says it was started then. Also takes atorvastatin, losartan-HCTZ (the likely culprit of her hypokalemia), levothyroxine, bisphosphonate. Remote history of HL, in remission. Follows with survivorship clinic. No AD at baseline. Can climb stairs, but says she has pulmonary fibrosis from chemotherapy, which leaves her "gasping for air." Does not drive. Denies tobacco/alcohol/recreational drug use. Lives with her . Retired .    Review of Systems:  All other review of systems is negative unless indicated above.    Allergies:  latex (Other)  Sudafed 12-Hour (Other)      PMHx/PSHx/Family Hx: As above, otherwise see below   Hypertension    Abnormal Pap smear of cervix    Sleep apnea    Hodgkin lymphoma    Borderline diabetes        Social Hx:  Per HPI    Medications:  MEDICATIONS  (STANDING):  sodium chloride 0.9%. 1000 milliLiter(s) (50 mL/Hr) IV Continuous <Continuous>    MEDICATIONS  (PRN):      Vitals:  T(C): 37 (05-21-25 @ 17:58), Max: 37 (05-21-25 @ 17:58)  HR: 85 (05-21-25 @ 23:59) (85 - 96)  BP: 145/67 (05-21-25 @ 23:59) (145/67 - 171/78)  RR: 18 (05-21-25 @ 23:59) (16 - 18)  SpO2: 98% (05-21-25 @ 23:59) (98% - 98%)    Physical Examination:  General - Lying on ED cart, on her LEFT side, she appears her stated age, appears uncomfortable  Eyes - Non-injected conjunctivae, anicteric sclerae    Neurologic Exam:  Mental status:  - Eyes open spontaneously, attends to examiner  - Oriented to: person, place, and time  - Speech: fluent  - Repetition and naming: intact   - Follows simple and cross commands    Cranial nerves - PERRL, VFF with finger counting, EOMI - no nystagmus, face sensation (V1-V3) intact b/l, facial strength intact without asymmetry b/l, hearing grossly intact, palate with symmetric elevation, shoulder shrug intact b/l, tongue midline on protrusion with full lateral movement  Dysarthria: not present    HINTS exam: no nystagmus, TOS negative, HIT WITHOUT corrective saccade    Motor - Normal bulk for age throughout. No pronator drift.  Strength testing - Very poor effort on MMT, but still grossly full-strength (5/5) throughout.  LUE 5/5  LLE 5/5  RUE 5/5  RLE 5/5    Sensation - Light touch intact throughout    DTRs  Deferred d/t focused neurologic exam    Coordination - FNF, HTS intact b/l.    Gait and station - Unable to assess d/t fall risk/safety concerns.    Labs:                        12.9   4.18  )-----------( 243      ( 21 May 2025 18:56 )             38.5     05-21    140  |  108  |  9   ----------------------------<  101[H]  2.8[LL]   |  17[L]  |  0.40[L]    Ca    7.8[L]      21 May 2025 18:56    TPro  5.9[L]  /  Alb  3.3  /  TBili  0.6  /  DBili  x   /  AST  20  /  ALT  15  /  AlkPhos  59  05-21    CAPILLARY BLOOD GLUCOSE        LIVER FUNCTIONS - ( 21 May 2025 18:56 )  Alb: 3.3 g/dL / Pro: 5.9 g/dL / ALK PHOS: 59 U/L / ALT: 15 U/L / AST: 20 U/L / GGT: x             PT/INR - ( 21 May 2025 18:56 )   PT: 11.7 sec;   INR: 1.02 ratio         PTT - ( 21 May 2025 18:56 )  PTT:33.6 sec  CSF:                  Radiology:  5/21/2025:  CT HEAD:  No acute intracranial hemorrhage, mass effect, or midline shift.    CTA NECK:  No evidence of significant stenosis, occlusion, or dissection.    CTA HEAD:  No large vessel occlusion or significant stenosis.  No evidence of aneurysm. Tiny aneurysms can be beyond the resolution of   CTA technique.

## 2025-05-22 NOTE — H&P ADULT - PROBLEM SELECTOR PLAN 3
daughter/family K of 2.8  - Has since improved  - Continue to follow, replete as needed  - May be 2/2 to HCTZ use, will hold

## 2025-05-22 NOTE — H&P ADULT - NSHPPHYSICALEXAM_GEN_ALL_CORE
T(C): 37.1 (05-22-25 @ 05:42), Max: 37.1 (05-22-25 @ 05:42)  HR: 89 (05-22-25 @ 05:42) (81 - 96)  BP: 120/71 (05-22-25 @ 05:42) (120/71 - 171/78)  RR: 18 (05-22-25 @ 05:42) (16 - 19)  SpO2: 98% (05-22-25 @ 05:42) (97% - 98%)    CONSTITUTIONAL: No apparent distress  EYES: PERRLA and symmetric, EOMI, No conjunctival or scleral injection, non-icteric. No nystagmus on my exam.  ENMT: Oral mucosa with moist membranes.  NECK: Supple, symmetric. No JVD.  RESP: No respiratory distress, no use of accessory muscles; CTA b/l, no WRR  CV: RRR, +S1S2, no MRG  GI: Soft, NT, ND, no rebound, no guarding  : No suprapubic tenderness. No CVA tenderness.  LYMPH: No cervical LAD or tenderness  MSK: No spinal tenderness, normal muscle strength/tone  EXTREMITIES: No pedal edema  SKIN: No rashes or ulcers noted  NEURO: A+Ox3, responsive. B/l UE tremor present  PSYCH: Appropriate insight/judgment; mood and affect appropriate

## 2025-05-22 NOTE — PATIENT PROFILE ADULT - NSPRESCRALCFREQ_GEN_A_NUR
Problem: Skin/Tissue Integrity  Goal: Absence of new skin breakdown  Description: 1. Monitor for areas of redness and/or skin breakdown  2. Assess vascular access sites hourly  3. Every 4-6 hours minimum:  Change oxygen saturation probe site  4. Every 4-6 hours:  If on nasal continuous positive airway pressure, respiratory therapy assess nares and determine need for appliance change or resting period.   Outcome: Progressing     Problem: Safety - Adult  Goal: Free from fall injury  11/27/2022 1602 by Sherlyn Nayak RN  Outcome: Progressing  11/27/2022 0508 by Samantha Orr RN  Outcome: Progressing  Flowsheets (Taken 11/27/2022 0508)  Free From Fall Injury: Liana Dakins family/caregiver on patient safety     Problem: ABCDS Injury Assessment  Goal: Absence of physical injury  11/27/2022 1602 by Sherlyn Nayak RN  Outcome: Progressing  11/27/2022 0508 by Samantha Orr RN  Outcome: Progressing  Flowsheets (Taken 11/27/2022 4647)  Absence of Physical Injury: Implement safety measures based on patient assessment     Problem: Pain  Goal: Verbalizes/displays adequate comfort level or baseline comfort level  Outcome: Progressing Monthly or less

## 2025-05-22 NOTE — CONSULT NOTE ADULT - ASSESSMENT
JOSÉ HEATON is a 64y RIGHT-handed woman, with PMH significant for vertigo, HTN, Hodgkin lymphoma (2000, in remission), T2DM, who presents to [facility] ED on 5/21/2025, with c/o room-spinning dizziness.    ED vitals notable for: afebrile, HR 80s-90s, BP to 171/78, RR 16-18, SpO2 98% on RA. Labs notable for: K 2.8->3.8, glucose 101/107, UA grossly unremarkable. CT imaging disclosed: grossly unremarkable. Exam notable for nonfocal neurologic exam with NO appendicular ataxia; HINTS exam is equivocal - no nystagmus, TOS is negative, HIT without corrective saccade.    NIHSS on admission: 0  LKN: 5/19/2025, 20:00  Pre-mRS: 1    Impression: Acute vestibular syndrome. Can localize to the cerebellum/brainstem vs. peripheral. R/o acute ischemic stroke. Mechanism if stroke: pending workup. Given persistent, severe symptoms despite treatment (including inability to ambulate) - would obtain inpatient MRI brain.    Recommendations:  [] Admit to Medicine  [] Dysphagia screen per core measures  [] BP goal up to 180/110 x 24 hours, then OK for normotension <140/90  [] Continue home aspirin 81mg daily  [] Continue home statin  [] A1c, lipid panel  [] MRI brain w/wo contrast (IAC protocol in comments)  [] TTE without bubble - only if infarct on MRI  [] Stroke neurochecks, vitals q4h  [] Fall and aspiration precautions  [] PT/OT as appropriate  [] Stroke education provided  [] Smoking cessation education not needed - non-smoker  [] Patient can follow up with Dr. Karan Cadena: [address], [place] [address]. [phone]. If vertigo is not caused by stroke - she will benefit from outpatient ENT evaluation and [facility] vestibular rehabilitation.    NO tenecteplase d/t outside therapeutic window.  NO thrombectomy d/t no ELVO.    To be seen by attending in the AM with attestation to follow. Formalized plan per attending attestation. Recommendations were relayed directly to the ED team. Note delayed d/t emergent patient care. JOSÉ HEATON is a 64y RIGHT-handed woman, with PMH significant for vertigo, HTN, Hodgkin lymphoma (2000, in remission), prediabetes, osteoporosis, who presents to [facility] ED on 5/21/2025, with c/o room-spinning dizziness. Neurology is consulted for dizziness. Patient has undergone unremarkable CTH, CTA H/N. +IVF, meclizine, diazepam, metoclopramide. Epley maneuver was reportedly performed twice by ED physician. Went to bed in her USOH on Monday, 5/19/2025, at 20:00. Awoke on Tuesday, 5/20 with vertigo. Describes it as true room-spinning dizziness. Worse when standing, movement. There all the time - not intermittent. Has had before, but not this severe. Says vestibular rehabilitation helped. Never seen a neurologist or ENT. No changes in hearing, pain in ears. No rashes, bug bites, recent travel. Currently denies HA/neck pain. Did have a cough for "1.5 months," but resolved several days ago. Denies history of clinical stroke/MI. Takes aspirin 81mg daily. Says she doesn't know why. Was hospitalized in [place] on 3/9/2025, for HTN. Says it was started then. Also takes atorvastatin, losartan-HCTZ (the likely culprit of her hypokalemia), levothyroxine, bisphosphonate. Remote history of HL, in remission. Follows with survivorship clinic. No AD at baseline. Can climb stairs, but says she has pulmonary fibrosis from chemotherapy, which leaves her "gasping for air." Does not drive. Denies tobacco/alcohol/recreational drug use. Lives with her . Retired .    ED vitals notable for: afebrile, HR 80s-90s, BP to 171/78, RR 16-18, SpO2 98% on RA. Labs notable for: K 2.8->3.8, glucose 101/107, UA grossly unremarkable. CT imaging disclosed: grossly unremarkable. Exam notable for nonfocal neurologic exam with NO appendicular ataxia; HINTS exam is equivocal - no nystagmus, TOS is negative, HIT without corrective saccade.    NIHSS on admission: 0  LKN: 5/19/2025, 20:00  Pre-mRS: 1    Impression: Acute vestibular syndrome. Can localize to the cerebellum/brainstem vs. peripheral. R/o acute ischemic stroke. Mechanism if stroke: pending workup. Given persistent, severe symptoms despite treatment (including inability to ambulate) and equivocal HINTS exam - would obtain inpatient MRI brain.    Recommendations:  [] Admit to Medicine  [] Dysphagia screen per core measures  [] Orthostatic vital signs if not already performed  [] BP goal up to 180/110 x 24 hours, then OK for normotension <140/90  [] Continue home aspirin 81mg daily  [] Continue home statin  [] A1c, lipid panel  [] MRI brain w/wo contrast (IAC protocol in comments)  [] TTE without bubble (if not performed recently)  [] Stroke neurochecks, vitals q4h  [] Fall and aspiration precautions  [] PT/OT as appropriate  [] Stroke education provided  [] Smoking cessation education not needed - non-smoker  [] Upon discharge, patient can follow up with Dr. Karan Cadena: [address], [place] [address]. [phone]. If vertigo is not caused by stroke - she will benefit from outpatient ENT evaluation and [facility] vestibular rehabilitation.    NO tenecteplase d/t outside therapeutic window.  NO thrombectomy d/t no ELVO.    Discussed plan with patient/. They express understanding and are in agreement with plan. To be seen by attending in the AM with attestation to follow. Formalized plan per attending attestation. Recommendations were relayed directly to the ED team. Note delayed d/t emergent patient care.

## 2025-05-23 ENCOUNTER — RESULT REVIEW (OUTPATIENT)
Age: 65
End: 2025-05-23

## 2025-05-23 ENCOUNTER — TRANSCRIPTION ENCOUNTER (OUTPATIENT)
Age: 65
End: 2025-05-23

## 2025-05-23 VITALS — DIASTOLIC BLOOD PRESSURE: 79 MMHG | HEART RATE: 76 BPM | SYSTOLIC BLOOD PRESSURE: 148 MMHG | OXYGEN SATURATION: 99 %

## 2025-05-23 LAB — T4 AB SER-ACNC: 7.5 UG/DL — SIGNIFICANT CHANGE UP (ref 4.6–12)

## 2025-05-23 PROCEDURE — 85610 PROTHROMBIN TIME: CPT

## 2025-05-23 PROCEDURE — 70498 CT ANGIOGRAPHY NECK: CPT

## 2025-05-23 PROCEDURE — 96375 TX/PRO/DX INJ NEW DRUG ADDON: CPT

## 2025-05-23 PROCEDURE — 84439 ASSAY OF FREE THYROXINE: CPT

## 2025-05-23 PROCEDURE — A9585: CPT

## 2025-05-23 PROCEDURE — 84443 ASSAY THYROID STIM HORMONE: CPT

## 2025-05-23 PROCEDURE — 99285 EMERGENCY DEPT VISIT HI MDM: CPT

## 2025-05-23 PROCEDURE — 84540 ASSAY OF URINE/UREA-N: CPT

## 2025-05-23 PROCEDURE — 80048 BASIC METABOLIC PNL TOTAL CA: CPT

## 2025-05-23 PROCEDURE — 84300 ASSAY OF URINE SODIUM: CPT

## 2025-05-23 PROCEDURE — 70496 CT ANGIOGRAPHY HEAD: CPT

## 2025-05-23 PROCEDURE — 80053 COMPREHEN METABOLIC PANEL: CPT

## 2025-05-23 PROCEDURE — 99233 SBSQ HOSP IP/OBS HIGH 50: CPT

## 2025-05-23 PROCEDURE — 85025 COMPLETE CBC W/AUTO DIFF WBC: CPT

## 2025-05-23 PROCEDURE — 84156 ASSAY OF PROTEIN URINE: CPT

## 2025-05-23 PROCEDURE — 83935 ASSAY OF URINE OSMOLALITY: CPT

## 2025-05-23 PROCEDURE — 97161 PT EVAL LOW COMPLEX 20 MIN: CPT

## 2025-05-23 PROCEDURE — 93306 TTE W/DOPPLER COMPLETE: CPT | Mod: 26

## 2025-05-23 PROCEDURE — 93005 ELECTROCARDIOGRAM TRACING: CPT

## 2025-05-23 PROCEDURE — 85027 COMPLETE CBC AUTOMATED: CPT

## 2025-05-23 PROCEDURE — 81001 URINALYSIS AUTO W/SCOPE: CPT

## 2025-05-23 PROCEDURE — 80061 LIPID PANEL: CPT

## 2025-05-23 PROCEDURE — 96374 THER/PROPH/DIAG INJ IV PUSH: CPT

## 2025-05-23 PROCEDURE — 84436 ASSAY OF TOTAL THYROXINE: CPT

## 2025-05-23 PROCEDURE — 36415 COLL VENOUS BLD VENIPUNCTURE: CPT

## 2025-05-23 PROCEDURE — 85730 THROMBOPLASTIN TIME PARTIAL: CPT

## 2025-05-23 PROCEDURE — 82570 ASSAY OF URINE CREATININE: CPT

## 2025-05-23 PROCEDURE — 82553 CREATINE MB FRACTION: CPT

## 2025-05-23 PROCEDURE — 82310 ASSAY OF CALCIUM: CPT

## 2025-05-23 PROCEDURE — 93306 TTE W/DOPPLER COMPLETE: CPT

## 2025-05-23 PROCEDURE — 84133 ASSAY OF URINE POTASSIUM: CPT

## 2025-05-23 PROCEDURE — 83970 ASSAY OF PARATHORMONE: CPT

## 2025-05-23 PROCEDURE — 84484 ASSAY OF TROPONIN QUANT: CPT

## 2025-05-23 PROCEDURE — 83036 HEMOGLOBIN GLYCOSYLATED A1C: CPT

## 2025-05-23 PROCEDURE — 70553 MRI BRAIN STEM W/O & W/DYE: CPT

## 2025-05-23 RX ORDER — PROPRANOLOL HCL 60 MG
1 TABLET ORAL
Qty: 60 | Refills: 0
Start: 2025-05-23 | End: 2025-06-21

## 2025-05-23 RX ORDER — MECLIZINE HCL 12.5 MG
1 TABLET ORAL
Qty: 28 | Refills: 0
Start: 2025-05-23 | End: 2025-05-29

## 2025-05-23 RX ADMIN — Medication 25 MILLIGRAM(S): at 11:12

## 2025-05-23 RX ADMIN — Medication 50 MICROGRAM(S): at 06:31

## 2025-05-23 RX ADMIN — LOSARTAN POTASSIUM 50 MILLIGRAM(S): 100 TABLET, FILM COATED ORAL at 06:32

## 2025-05-23 RX ADMIN — ENOXAPARIN SODIUM 40 MILLIGRAM(S): 100 INJECTION SUBCUTANEOUS at 06:31

## 2025-05-23 RX ADMIN — Medication 81 MILLIGRAM(S): at 11:12

## 2025-05-23 RX ADMIN — Medication 40 MILLIGRAM(S): at 06:31

## 2025-05-23 NOTE — PHYSICAL THERAPY INITIAL EVALUATION ADULT - PERTINENT HX OF CURRENT PROBLEM, REHAB EVAL
64y RIGHT-handed woman, with PMH significant for vertigo, HTN, Hodgkin lymphoma (2000, in remission), prediabetes, osteoporosis, who presents to [facility] ED on 5/21/2025, with c/o room-spinning dizziness. Neurology is consulted for dizziness. Patient has undergone unremarkable CTH, CTA H/N. +IVF, meclizine, diazepam, metoclopramide. Epley maneuver was reportedly performed twice by ED physician. Went to bed in her USOH on Monday, 5/19/2025, at 20:00. Awoke on Tuesday, 5/20 with vertigo. Describes it as true room-spinning dizziness. Worse when standing, movement. There all the time - not intermittent. Has had before, but not this severe. Says vestibular rehabilitation helped. Never seen a neurologist or ENT. No changes in hearing, pain in ears. No rashes, bug bites, recent travel. Currently denies HA/neck pain. Did have a cough for "1.5 months," but resolved several days ago. Denies history of clinical stroke/MI. Takes aspirin 81mg daily. Says she doesn't know why. Was hospitalized in [place] on 3/9/2025, for HTN. Says it was started then. Also takes atorvastatin, losartan-HCTZ (the likely culprit of her hypokalemia), levothyroxine, bisphosphonate. Remote history of HL, in remission. Follows with survivorship clinic. No AD at baseline. Can climb stairs, but says she has pulmonary fibrosis from chemotherapy, which leaves her "gasping for air." Does not drive. Denies tobacco/alcohol/recreational drug use. Lives with her . Retired . Labs notable for: K 2.8->3.8, glucose 101/107, UA grossly unremarkable. CT imaging disclosed: grossly unremarkable. Exam notable for nonfocal neurologic exam with NO appendicular ataxia; HINTS exam is equivocal - no nystagmus, TOS is negative, HIT without corrective saccade.

## 2025-05-23 NOTE — PHYSICAL THERAPY INITIAL EVALUATION ADULT - GAIT TRAINING, PT EVAL
GOAL: Patient will ambulate 300 feet independently with no AD and no instances of dizziness in 2 weeks.

## 2025-05-23 NOTE — PROGRESS NOTE ADULT - ASSESSMENT
JOSÉ HEATON is a 64y RIGHT-handed woman, with PMH significant for vertigo, HTN, Hodgkin lymphoma (2000, in remission), prediabetes, osteoporosis, who presents to [facility] ED on 5/21/2025, with c/o room-spinning dizziness. Neurology is consulted for dizziness. Patient has undergone unremarkable CTH, CTA H/N. +IVF, meclizine, diazepam, metoclopramide. Epley maneuver was reportedly performed twice by ED physician. Went to bed in her USOH on Monday, 5/19/2025, at 20:00. Awoke on Tuesday, 5/20 with vertigo. Describes it as true room-spinning dizziness. Worse when standing, movement. There all the time - not intermittent. Has had before, but not this severe. Says vestibular rehabilitation helped. Never seen a neurologist or ENT. No changes in hearing, pain in ears. No rashes, bug bites, recent travel. Currently denies HA/neck pain. Did have a cough for "1.5 months," but resolved several days ago. Denies history of clinical stroke/MI. Takes aspirin 81mg daily. Says she doesn't know why. Was hospitalized in [place] on 3/9/2025, for HTN. Says it was started then. Also takes atorvastatin, losartan-HCTZ (the likely culprit of her hypokalemia), levothyroxine, bisphosphonate. Remote history of HL, in remission. Follows with survivorship clinic. No AD at baseline. Can climb stairs, but says she has pulmonary fibrosis from chemotherapy, which leaves her "gasping for air." Does not drive. Denies tobacco/alcohol/recreational drug use. Lives with her . Retired .    ED vitals notable for: afebrile, HR 80s-90s, BP to 171/78, RR 16-18, SpO2 98% on RA. Labs notable for: K 2.8->3.8, glucose 101/107, UA grossly unremarkable. CT imaging disclosed: grossly unremarkable. Exam notable for nonfocal neurologic exam with NO appendicular ataxia; HINTS exam is equivocal - no nystagmus, TOS is negative, HIT without corrective saccade.    NIHSS on admission: 0  LKN: 5/19/2025, 20:00  Pre-mRS: 1    Impression: Acute vestibular syndrome. Can localize to the cerebellum/brainstem vs. peripheral. R/o acute ischemic stroke. Mechanism if stroke: pending workup. Given persistent, severe symptoms despite treatment (including inability to ambulate) and equivocal HINTS exam - would obtain inpatient MRI brain.    Recommendations:  [] Admit to Medicine  [] Dysphagia screen per core measures  [] Orthostatic vital signs if not already performed  [] BP goal up to 180/110 x 24 hours, then OK for normotension <140/90  [] Continue home aspirin 81mg daily  [] Continue home statin  [] TTE without bubble is not required from stroke standpoint   [] Stroke neurochecks, vitals q4h  [] Fall and aspiration precautions  [] PT/OT as appropriate    completed   [x] MRI brain w/wo contrast (IAC protocol in comments)  [x] A1c (6.4), lipid panel (92)    [] Upon discharge, patient can follow up with Dr. Karan Cadena: [address], [place] [address]. [phone]. If vertigo is not caused by stroke - she will benefit from outpatient ENT evaluation and [facility] vestibular rehabilitation.    NO tenecteplase d/t outside therapeutic window.  NO thrombectomy d/t no ELVO.    Discussed plan with patient/. They express understanding and are in agreement with plan. To be seen by attending in the AM with attestation to follow. Formalized plan per attending attestation. Recommendations were relayed directly to the ED team. Note delayed d/t emergent patient care.   JOSÉ HEATON is a 64y RIGHT-handed woman, with PMH significant for vertigo, HTN, Hodgkin lymphoma (2000, in remission), prediabetes, osteoporosis, who presents to [facility] ED on 5/21/2025, with c/o room-spinning dizziness. Neurology is consulted for dizziness. Patient has undergone unremarkable CTH, CTA H/N. +IVF, meclizine, diazepam, metoclopramide. Epley maneuver was reportedly performed twice by ED physician. Went to bed in her USOH on Monday, 5/19/2025, at 20:00. Awoke on Tuesday, 5/20 with vertigo. Describes it as true room-spinning dizziness. Worse when standing, movement. There all the time - not intermittent. Has had before, but not this severe. Says vestibular rehabilitation helped. Never seen a neurologist or ENT. No changes in hearing, pain in ears. No rashes, bug bites, recent travel. Currently denies HA/neck pain. Did have a cough for "1.5 months," but resolved several days ago. Denies history of clinical stroke/MI. Takes aspirin 81mg daily. Says she doesn't know why. Was hospitalized in [place] on 3/9/2025, for HTN. Says it was started then. Also takes atorvastatin, losartan-HCTZ (the likely culprit of her hypokalemia), levothyroxine, bisphosphonate. Remote history of HL, in remission. Follows with survivorship clinic. No AD at baseline. Can climb stairs, but says she has pulmonary fibrosis from chemotherapy, which leaves her "gasping for air." Does not drive. Denies tobacco/alcohol/recreational drug use. Lives with her . Retired .    ED vitals notable for: afebrile, HR 80s-90s, BP to 171/78, RR 16-18, SpO2 98% on RA. Labs notable for: K 2.8->3.8, glucose 101/107, UA grossly unremarkable. CT imaging disclosed: grossly unremarkable. Exam notable for nonfocal neurologic exam with NO appendicular ataxia; HINTS exam is equivocal - no nystagmus, TOS is negative, HIT without corrective saccade.  NIHSS on admission: 0  LKN: 5/19/2025, 20:00  Pre-mRS: 1  MRI brain neg for acute infarct ;l old R caudate infarct       Impression:   1) Acute vestibular syndrome. improved   2) tremors, stable   3) chronic small vessel R caudate infarct     Recommendations:    [] Orthostatic vital signs if not already performed  [] BP goal up to 180/110 x 24 hours, then OK for normotension <140/90  [] Continue home aspirin 81mg daily  [] Continue home statin  [] TTE without bubble is not required from stroke standpoint   [] Stroke neurochecks, vitals q4h  [] Fall and aspiration precautions  [] PT/OT as appropriate    completed   [x] MRI brain w/wo contrast (IAC protocol in comments)  [x] A1c (6.4), lipid panel (92)    [] Upon discharge, patient can follow up with Dr. Karan Cadena: [address], [place] [address]. [phone]. If vertigo is not caused by stroke - she will benefit from outpatient ENT evaluation and [facility] vestibular rehabilitation.    NO tenecteplase d/t outside therapeutic window.  NO thrombectomy d/t no ELVO.    Discussed plan with patient/son

## 2025-05-23 NOTE — DISCHARGE NOTE NURSING/CASE MANAGEMENT/SOCIAL WORK - NSDCPEFALRISK_GEN_ALL_CORE
For information on Fall & Injury Prevention, visit: [email or web address] OR  [email or web address] OR  https://www.cdc.gov/steadi/patient.html

## 2025-05-23 NOTE — DISCHARGE NOTE NURSING/CASE MANAGEMENT/SOCIAL WORK - FINANCIAL ASSISTANCE
[facility] provides services at a reduced cost to those who are determined to be eligible through [facility]’s financial assistance program. Information regarding [facility]’s financial assistance program can be found by going to [email or web address] or by calling [phone].

## 2025-05-23 NOTE — DISCHARGE NOTE NURSING/CASE MANAGEMENT/SOCIAL WORK - PATIENT PORTAL LINK FT
You can access the FollowMyHealth Patient Portal offered by [facility] by registering at the following website: [email or web address]. By joining [employer]’s FollowMyHealth portal, you will also be able to view your health information using other applications (apps) compatible with our system.

## 2025-05-23 NOTE — DISCHARGE NOTE PROVIDER - NSDCCPCAREPLAN_GEN_ALL_CORE_FT
PRINCIPAL DISCHARGE DIAGNOSIS  Diagnosis: Vertigo  Assessment and Plan of Treatment: You were admitted for severe vertigo - work-up revealed no central neurological cause (ie, no stroke, bleed or mass) - you likely have peripheral vertigo. Your symptoms were somewhat improved and you were able to ambulate and do functional dialy activities.   You will be discharged with meclicine to be take as needed  You will be discharge with a referral for out patient vestibular and physical therapy   Please follow up with neurology for ongoing management      SECONDARY DISCHARGE DIAGNOSES  Diagnosis: Essential tremor  Assessment and Plan of Treatment: You also had worsening of your essential tremors and were started on propranolol. Please continue taking propranolol and diazepam as needed for symptoms.   Please follow up with neurology for ongoing management.

## 2025-05-23 NOTE — PHYSICAL THERAPY INITIAL EVALUATION ADULT - ADDITIONAL COMMENTS
Pt reports that she lives in a pvt apt with her  with no TANYA and elevator access. Pt states that she was independent with all ADLs and mobility prior to admission with no DME. Pt does not own any DME.

## 2025-05-23 NOTE — DISCHARGE NOTE PROVIDER - NSDCFUADDAPPT_GEN_ALL_CORE_FT
APPTS ARE READY TO BE MADE: [ ] YES    Best Family or Patient Contact (if needed):    Additional Information about above appointments (if needed):    1: follow up with neurology (either clinic) within 2-3 weeks   2:   3:     Other comments or requests:

## 2025-05-23 NOTE — DISCHARGE NOTE PROVIDER - NSDCCPTREATMENT_GEN_ALL_CORE_FT
PRINCIPAL PROCEDURE  Procedure: MRI head  Findings and Treatment: FINDINGS:  No cerebellopontine angle or IAC mass. The 7th/8th cranial nerve   complexes are unremarkable. No abnormal enhancement. The membranous   labyrinths are unremarkable.  No parenchymal mass, edema, hemorrhage, or evidence for acute ischemia.   Focus of T2/FLAIR intense signal in the right caudate body (8-19), new   since 2008. Otherwise no abnormal brain signal.  No abnormal parenchymal or leptomeningeal enhancement.  No extra-axial mass or collection.  The ventricles and sulci are within normal limits. No hydrocephalus. The   basal cisterns are not effaced.  The major intracranial vascular flow voids are preserved.  The paranasal sinuses are clear. Trace right mastoid effusion,   nonspecific. The left tympanomastoid cavity is clear.  The orbits are unremarkable.  IMPRESSION:  Normal contrast-enhanced MRI of the IACs.  No abnormal parenchymal leptomeningeal enhancement.  Focus of T2/FLAIR intense signal in the right caudate body, new since   2008, nonspecific but likely an interval but old tiny lacunar infarction.

## 2025-05-23 NOTE — DISCHARGE NOTE PROVIDER - HOSPITAL COURSE
HPI:  64F w/Hx of vertigo, tremor, Hodgkin lymphoma (2000, in remission), HTN, hypothyroidism, HLD, osteoporosis presents due to significant vertigo, nausea and tremors that began suddenly on awakening Tuesday morning. Denies vomiting. Patient has had vertigo prior similar to this episode that self-resolved. Pt also reports tremors in b/l UE. Patient has had tremors for years, was previously on propranolol but that was discontinued when she started losartan/HCTZ for HTN. Tremors associated with anxiety and palpitations. Reports improvement of vertigo since arrival and receiving meclizine and diazepam. Diazepam has also improved tremors in past. Patient denies fever, chills, chest pain, SOB, abd pain, dysuria. (22 May 2025 07:21)    Hospital Course:  Pt admitted for intractable vertigo and tremors. Pt had CT imaging of head and neck on admission which were all negative. Pt seen by neurology team - symptoms likely peripheral vertigo, but MRI and TTE were recommended to rule out central process. MRI head showed old lacunar infarct but no acute stroke or pathology; TTE was normal without any significant pathology. While admitted, pts symptoms were somewhat improved, which allowed pt to ambulate and perform activities of daily life.   Pt also with worsening essential tremors, for which pt was started on propranolol and recommended for outpt neurology follow up.   Pt seen by PT who recommended vestibular and outpt rehab.   Pt stable for discharge home.       Important Medication Changes and Reason:  see med rec     Active or Pending Issues Requiring Follow-up:  f/u neurology    Advanced Directives:   [X] Full code  [ ] DNR  [ ] Hospice    Discharge Diagnoses:  Vertigo [R42]    Essential tremor [G25.0]    Hypothyroidism [E03.9]    HTN (hypertension) [I10]    HLD (hyperlipidemia) [E78.5]    GERD (gastroesophageal reflux disease) [K21.9]    Osteoporosis [M81.0]    Hypokalemia [E87.6]

## 2025-05-23 NOTE — DISCHARGE NOTE PROVIDER - ATTENDING DISCHARGE PHYSICAL EXAMINATION:
Pt seen and examined, pt feeling somewhat better, less dizzy than on admission, but not back to baseline; able to ambulate and wash up, no other complaints     PHYSICAL EXAM:  GENERAL: NAD, well-developed  HEAD:  Atraumatic, normocephalic  EYES: EOMI, conjunctiva and sclera clear  NECK: Supple, no JVD  CHEST/LUNG: Clear to auscultation bilaterally; no wheezing or rales  HEART: Regular rate and rhythm; no murmurs, no LE edema   ABDOMEN: Soft, nontender, nondistended; bowel sounds present  EXTREMITIES:  2+ Peripheral Pulses, no clubbing, cyanosis  PSYCH: AAOx3, calm affect, not anxious  SKIN: No rashes or lesions    pt with MRI without acute findings, TTE WNL, and pts symptoms slightly better - likely peripheral vertigo  pt also with worsened essential tremors, plan to f/u as outpt for ongoing management, will d/c with rx for propranolol   PT recs OPT, vestibular rehab   stable for d/c home

## 2025-05-23 NOTE — DISCHARGE NOTE PROVIDER - CARE PROVIDER_API CALL
Karan Cadena  Neurology  [address], [address]  [place] [address]  Phone: [phone]  Fax: [phone]  Follow Up Time:

## 2025-05-23 NOTE — PROGRESS NOTE ADULT - SUBJECTIVE AND OBJECTIVE BOX
*************************************  NEUROLOGY PROGRESS NOTE  **************************************    JOSÉ HEATON  Female  MRN-[id number]    Subjective: Patient seen and examined at bedside with Neurology team and attending     Interval History:    patient stable      VITAL SIGNS:  Vital Signs Last 24 Hrs  T(C): 36.6 (23 May 2025 07:32), Max: 36.8 (23 May 2025 00:50)  T(F): 97.9 (23 May 2025 07:32), Max: 98.2 (23 May 2025 00:50)  HR: 70 (23 May 2025 07:32) (70 - 76)  BP: 112/72 (23 May 2025 07:32) (106/68 - 133/70)  BP(mean): --  RR: 18 (23 May 2025 07:32) (18 - 18)  SpO2: 98% (23 May 2025 07:32) (96% - 98%)    Parameters below as of 23 May 2025 07:32  Patient On (Oxygen Delivery Method): room air    PHYSICAL EXAMINATION:    General - Lying on ED cart, on her LEFT side, she appears her stated age, appears uncomfortable  Eyes - Non-injected conjunctivae, anicteric sclerae    Neurologic Exam:  Mental status:  - Eyes open spontaneously, attends to examiner  - Oriented to: person, place, and time  - Speech: fluent  - Repetition and naming: intact   - Follows simple and cross commands    Cranial nerves - PERRL, VFF with finger counting, EOMI - no nystagmus, face sensation (V1-V3) intact b/l, facial strength intact without asymmetry b/l, hearing grossly intact, palate with symmetric elevation, shoulder shrug intact b/l, tongue midline on protrusion with full lateral movement  Dysarthria: not present    HINTS exam: no nystagmus, TOS negative, HIT WITHOUT corrective saccade    Motor - Normal bulk for age throughout. No pronator drift.  Strength testing - Very poor effort on MMT, but still grossly full-strength (5/5) throughout.  LUE 5/5  LLE 5/5  RUE 5/5  RLE 5/5    Sensation - Light touch intact throughout    DTRs  Deferred d/t focused neurologic exam    Coordination - FNF, HTS intact b/l.    Gait and station - Unable to assess d/t fall risk/safety concerns.    LABS:    CBC                       14.0   5.76  )-----------( 279      ( 22 May 2025 07:56 )             42.4     Chem 05-22    138  |  103  |  9   ----------------------------<  88  4.1   |  22  |  0.54    Ca    9.3      22 May 2025 07:56    TPro  7.3  /  Alb  4.2  /  TBili  0.7  /  DBili  x   /  AST  20  /  ALT  18  /  AlkPhos  71  05-22    LFTs LIVER FUNCTIONS - ( 22 May 2025 07:56 )  Alb: 4.2 g/dL / Pro: 7.3 g/dL / ALK PHOS: 71 U/L / ALT: 18 U/L / AST: 20 U/L / GGT: x           Coagulopathy PT/INR - ( 21 May 2025 18:56 )   PT: 11.7 sec;   INR: 1.02 ratio         PTT - ( 21 May 2025 18:56 )  PTT:33.6 sec  Lipid Panel 05-22 Chol 182 LDL -- HDL 78 Trig 60  A1c   Cardiac enzymes CARDIAC MARKERS ( 21 May 2025 18:56 )  x     / x     / x     / x     / <1.0 ng/mL      U/A Urinalysis Basic - ( 22 May 2025 07:56 )    Color: x / Appearance: x / SG: x / pH: x  Gluc: 88 mg/dL / Ketone: x  / Bili: x / Urobili: x   Blood: x / Protein: x / Nitrite: x   Leuk Esterase: x / RBC: x / WBC x   Sq Epi: x / Non Sq Epi: x / Bacteria: x      CSF  Immunological  Other      RADIOLOGY & ADDITIONAL STUDIES:      5/21/2025:  CT HEAD:  No acute intracranial hemorrhage, mass effect, or midline shift.    CTA NECK:  No evidence of significant stenosis, occlusion, or dissection.    CTA HEAD:  No large vessel occlusion or significant stenosis.  No evidence of aneurysm. Tiny aneurysms can be beyond the resolution of   CTA technique.    < from: MR Head w/wo IV Cont (05.22.25 @ 14:47) >  IMPRESSION:    Normal contrast-enhanced MRI of the IACs.  No abnormal parenchymal leptomeningeal enhancement.  Focus of T2/FLAIR intense signal in the right caudate body, new since   2008, nonspecific but likely an interval but old tiny lacunar infarction.    --- End of Report ---      < end of copied text >

## 2025-05-23 NOTE — PROGRESS NOTE ADULT - ATTENDING COMMENTS
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

## 2025-05-23 NOTE — DISCHARGE NOTE PROVIDER - NSDCMRMEDTOKEN_GEN_ALL_CORE_FT
alendronate 70 mg oral tablet: 1 tab(s) orally once a week  atorvastatin 40 mg oral tablet: 1 tab(s) orally once a day (at bedtime)  levothyroxine 50 mcg (0.05 mg) oral tablet: 1 tab(s) orally once a day  losartan-hydrochlorothiazide 50 mg-12.5 mg oral tablet: 1 tab(s) orally once a day  meclizine 25 mg oral tablet: 1 tab(s) orally every 6 hours as needed for Dizziness  propranolol 20 mg oral tablet: 1 tab(s) orally every 12 hours

## 2025-05-23 NOTE — DISCHARGE NOTE PROVIDER - NSFOLLOWUPCLINICS_GEN_ALL_ED_FT
[facility]  [facility]  [address] [place] [address]  Phone: [phone]  Fax: [phone]

## 2025-05-23 NOTE — DISCHARGE NOTE PROVIDER - NSDCFUSCHEDAPPT_GEN_ALL_CORE_FT
[facility]  CARDSTRES [address]   Scheduled Appointment: 05/27/2025    [facility]  CARDIOLOGY [address] [facility]   Scheduled Appointment: 05/28/2025    Donna Blount  [facility]  INTMED  [address]  Scheduled Appointment: 06/16/2025    Rajwinder De Jesus  [facility]  PHYSMED [address]  Scheduled Appointment: 06/17/2025    Sharon Gurrola  [facility]  INTMED [address]  Scheduled Appointment: 08/08/2025

## 2025-05-27 ENCOUNTER — APPOINTMENT (OUTPATIENT)
Dept: CV DIAGNOSTICS | Facility: HOSPITAL | Age: 65
End: 2025-05-27

## 2025-05-28 ENCOUNTER — APPOINTMENT (OUTPATIENT)
Dept: CARDIOLOGY | Facility: CLINIC | Age: 65
End: 2025-05-28

## 2025-05-30 DIAGNOSIS — I10 ESSENTIAL (PRIMARY) HYPERTENSION: ICD-10-CM

## 2025-06-02 ENCOUNTER — RX RENEWAL (OUTPATIENT)
Age: 65
End: 2025-06-02

## 2025-06-02 ENCOUNTER — NON-APPOINTMENT (OUTPATIENT)
Age: 65
End: 2025-06-02

## 2025-06-02 DIAGNOSIS — R42 DIZZINESS AND GIDDINESS: ICD-10-CM

## 2025-06-02 DIAGNOSIS — R10.2 PELVIC AND PERINEAL PAIN: ICD-10-CM

## 2025-06-02 DIAGNOSIS — M54.50 LOW BACK PAIN, UNSPECIFIED: ICD-10-CM

## 2025-06-02 DIAGNOSIS — M25.559 PAIN IN UNSPECIFIED HIP: ICD-10-CM

## 2025-06-02 DIAGNOSIS — G89.29 PAIN IN UNSPECIFIED HIP: ICD-10-CM

## 2025-06-03 ENCOUNTER — APPOINTMENT (OUTPATIENT)
Facility: CLINIC | Age: 65
End: 2025-06-03

## 2025-06-03 PROCEDURE — 99214 OFFICE O/P EST MOD 30 MIN: CPT

## 2025-06-03 RX ORDER — GABAPENTIN 300 MG/1
300 CAPSULE ORAL
Qty: 1 | Refills: 3 | Status: ACTIVE | COMMUNITY
Start: 2025-06-03 | End: 1900-01-01

## 2025-06-05 DIAGNOSIS — I10 ESSENTIAL (PRIMARY) HYPERTENSION: ICD-10-CM

## 2025-06-16 ENCOUNTER — APPOINTMENT (OUTPATIENT)
Dept: INTERNAL MEDICINE | Facility: CLINIC | Age: 65
End: 2025-06-16
Payer: MEDICARE

## 2025-06-16 ENCOUNTER — OUTPATIENT (OUTPATIENT)
Dept: OUTPATIENT SERVICES | Facility: HOSPITAL | Age: 65
LOS: 1 days | End: 2025-06-16
Payer: MEDICARE

## 2025-06-16 VITALS
HEART RATE: 84 BPM | BODY MASS INDEX: 22.88 KG/M2 | DIASTOLIC BLOOD PRESSURE: 80 MMHG | SYSTOLIC BLOOD PRESSURE: 150 MMHG | OXYGEN SATURATION: 98 % | WEIGHT: 115 LBS | HEIGHT: 59.5 IN

## 2025-06-16 DIAGNOSIS — Z98.49 CATARACT EXTRACTION STATUS, UNSPECIFIED EYE: Chronic | ICD-10-CM

## 2025-06-16 DIAGNOSIS — Z98.891 HISTORY OF UTERINE SCAR FROM PREVIOUS SURGERY: Chronic | ICD-10-CM

## 2025-06-16 DIAGNOSIS — I10 ESSENTIAL (PRIMARY) HYPERTENSION: ICD-10-CM

## 2025-06-16 PROBLEM — R10.2 FEMALE PELVIC PAIN: Status: RESOLVED | Noted: 2021-11-18 | Resolved: 2025-06-16

## 2025-06-16 PROCEDURE — G0402: CPT

## 2025-06-16 PROCEDURE — G0402 INITIAL PREVENTIVE EXAM: CPT

## 2025-06-16 RX ORDER — MECLIZINE HYDROCHLORIDE 25 MG/1
25 TABLET ORAL
Refills: 0 | Status: ACTIVE | COMMUNITY

## 2025-06-26 DIAGNOSIS — E03.8 OTHER SPECIFIED HYPOTHYROIDISM: ICD-10-CM

## 2025-06-26 DIAGNOSIS — K82.4 CHOLESTEROLOSIS OF GALLBLADDER: ICD-10-CM

## 2025-06-26 DIAGNOSIS — Z00.00 ENCOUNTER FOR GENERAL ADULT MEDICAL EXAMINATION WITHOUT ABNORMAL FINDINGS: ICD-10-CM

## 2025-06-26 DIAGNOSIS — M81.0 AGE-RELATED OSTEOPOROSIS WITHOUT CURRENT PATHOLOGICAL FRACTURE: ICD-10-CM

## 2025-06-26 DIAGNOSIS — E78.5 HYPERLIPIDEMIA, UNSPECIFIED: ICD-10-CM

## 2025-06-26 DIAGNOSIS — J84.10 PULMONARY FIBROSIS, UNSPECIFIED: ICD-10-CM

## 2025-06-26 DIAGNOSIS — Z92.3 PERSONAL HISTORY OF IRRADIATION: ICD-10-CM

## 2025-06-26 DIAGNOSIS — C81.90 HODGKIN LYMPHOMA, UNSPECIFIED, UNSPECIFIED SITE: ICD-10-CM

## 2025-06-26 DIAGNOSIS — R73.09 OTHER ABNORMAL GLUCOSE: ICD-10-CM

## 2025-07-03 ENCOUNTER — APPOINTMENT (OUTPATIENT)
Dept: OTOLARYNGOLOGY | Facility: CLINIC | Age: 65
End: 2025-07-03
Payer: MEDICARE

## 2025-07-03 ENCOUNTER — RX RENEWAL (OUTPATIENT)
Age: 65
End: 2025-07-03

## 2025-07-03 VITALS
HEIGHT: 59.5 IN | BODY MASS INDEX: 22.88 KG/M2 | HEART RATE: 89 BPM | WEIGHT: 115 LBS | DIASTOLIC BLOOD PRESSURE: 84 MMHG | SYSTOLIC BLOOD PRESSURE: 146 MMHG

## 2025-07-03 PROBLEM — H90.3 SENSORINEURAL HEARING LOSS (SNHL) OF BOTH EARS: Status: ACTIVE | Noted: 2025-07-03

## 2025-07-03 PROCEDURE — 92567 TYMPANOMETRY: CPT

## 2025-07-03 PROCEDURE — 92557 COMPREHENSIVE HEARING TEST: CPT

## 2025-07-03 PROCEDURE — 99204 OFFICE O/P NEW MOD 45 MIN: CPT

## 2025-07-08 ENCOUNTER — APPOINTMENT (OUTPATIENT)
Dept: CARDIOLOGY | Facility: CLINIC | Age: 65
End: 2025-07-08
Payer: MEDICARE

## 2025-07-08 VITALS
BODY MASS INDEX: 22.88 KG/M2 | OXYGEN SATURATION: 98 % | WEIGHT: 115 LBS | HEIGHT: 59.5 IN | DIASTOLIC BLOOD PRESSURE: 80 MMHG | SYSTOLIC BLOOD PRESSURE: 124 MMHG | HEART RATE: 83 BPM

## 2025-07-08 PROCEDURE — G2211 COMPLEX E/M VISIT ADD ON: CPT

## 2025-07-08 PROCEDURE — 99214 OFFICE O/P EST MOD 30 MIN: CPT

## 2025-07-08 PROCEDURE — 93000 ELECTROCARDIOGRAM COMPLETE: CPT

## 2025-07-08 RX ORDER — PROPRANOLOL HYDROCHLORIDE 20 MG/1
20 TABLET ORAL
Qty: 60 | Refills: 0 | Status: DISCONTINUED | COMMUNITY
Start: 2025-05-23

## 2025-07-08 RX ORDER — METRONIDAZOLE 7.5 MG/G
0.75 CREAM TOPICAL
Qty: 45 | Refills: 0 | Status: DISCONTINUED | COMMUNITY
Start: 2025-02-05

## 2025-07-08 RX ORDER — HYDROCORTISONE 1 %
12 CREAM (GRAM) TOPICAL
Qty: 400 | Refills: 0 | Status: DISCONTINUED | COMMUNITY
Start: 2025-01-09

## 2025-07-08 RX ORDER — NAPROXEN 500 MG/1
500 TABLET ORAL
Qty: 89 | Refills: 0 | Status: DISCONTINUED | COMMUNITY
Start: 2025-07-03

## 2025-07-08 RX ORDER — LOSARTAN POTASSIUM AND HYDROCHLOROTHIAZIDE 12.5; 1 MG/1; MG/1
100-12.5 TABLET ORAL
Qty: 90 | Refills: 0 | Status: DISCONTINUED | COMMUNITY
Start: 2025-04-30

## 2025-07-08 RX ORDER — GUAIFENESIN 1200 MG/1
1200 TABLET, EXTENDED RELEASE ORAL
Qty: 10 | Refills: 0 | Status: DISCONTINUED | COMMUNITY
Start: 2025-04-07

## 2025-07-15 ENCOUNTER — APPOINTMENT (OUTPATIENT)
Dept: INTERNAL MEDICINE | Facility: CLINIC | Age: 65
End: 2025-07-15

## 2025-07-16 ENCOUNTER — APPOINTMENT (OUTPATIENT)
Dept: CV DIAGNOSTICS | Facility: HOSPITAL | Age: 65
End: 2025-07-16

## 2025-07-16 ENCOUNTER — OUTPATIENT (OUTPATIENT)
Dept: OUTPATIENT SERVICES | Facility: HOSPITAL | Age: 65
LOS: 1 days | End: 2025-07-16
Payer: MEDICARE

## 2025-07-16 ENCOUNTER — RESULT REVIEW (OUTPATIENT)
Age: 65
End: 2025-07-16

## 2025-07-16 DIAGNOSIS — Z98.49 CATARACT EXTRACTION STATUS, UNSPECIFIED EYE: Chronic | ICD-10-CM

## 2025-07-16 DIAGNOSIS — I49.1 ATRIAL PREMATURE DEPOLARIZATION: ICD-10-CM

## 2025-07-16 DIAGNOSIS — Z98.890 OTHER SPECIFIED POSTPROCEDURAL STATES: Chronic | ICD-10-CM

## 2025-07-16 DIAGNOSIS — Z98.891 HISTORY OF UTERINE SCAR FROM PREVIOUS SURGERY: Chronic | ICD-10-CM

## 2025-07-16 PROCEDURE — 93016 CV STRESS TEST SUPVJ ONLY: CPT

## 2025-07-16 PROCEDURE — 93018 CV STRESS TEST I&R ONLY: CPT

## 2025-07-25 ENCOUNTER — APPOINTMENT (OUTPATIENT)
Dept: HEMATOLOGY ONCOLOGY | Facility: CLINIC | Age: 65
End: 2025-07-25

## 2025-07-25 ENCOUNTER — RESULT REVIEW (OUTPATIENT)
Age: 65
End: 2025-07-25

## 2025-07-25 ENCOUNTER — APPOINTMENT (OUTPATIENT)
Dept: INTERNAL MEDICINE | Facility: CLINIC | Age: 65
End: 2025-07-25
Payer: MEDICARE

## 2025-07-25 VITALS
BODY MASS INDEX: 23.2 KG/M2 | TEMPERATURE: 97.8 F | WEIGHT: 116.82 LBS | RESPIRATION RATE: 16 BRPM | DIASTOLIC BLOOD PRESSURE: 76 MMHG | HEART RATE: 88 BPM | SYSTOLIC BLOOD PRESSURE: 129 MMHG | OXYGEN SATURATION: 98 %

## 2025-07-25 DIAGNOSIS — E03.8 OTHER SPECIFIED HYPOTHYROIDISM: ICD-10-CM

## 2025-07-25 DIAGNOSIS — M81.0 AGE-RELATED OSTEOPOROSIS W/OUT CURRENT PATHOLOGICAL FRACTURE: ICD-10-CM

## 2025-07-25 DIAGNOSIS — C81.90 HODGKIN LYMPHOMA, UNSPECIFIED, UNSPECIFIED SITE: ICD-10-CM

## 2025-07-25 DIAGNOSIS — F41.9 ANXIETY DISORDER, UNSPECIFIED: ICD-10-CM

## 2025-07-25 PROCEDURE — 99215 OFFICE O/P EST HI 40 MIN: CPT

## 2025-07-28 LAB
APPEARANCE: CLEAR
B2 MICROGLOB SERPL-MCNC: 1 MG/L
BACTERIA: NEGATIVE /HPF
BILIRUBIN URINE: NEGATIVE
BLOOD URINE: NEGATIVE
CAST: 0 /LPF
COLOR: YELLOW
CRP SERPL-MCNC: <3 MG/L
EPITHELIAL CELLS: 0 /HPF
ESTIMATED AVERAGE GLUCOSE: 134 MG/DL
FERRITIN SERPL-MCNC: 193 NG/ML
GLUCOSE QUALITATIVE U: NEGATIVE MG/DL
HBA1C MFR BLD HPLC: 6.3 %
IRON SATN MFR SERPL: 21 %
IRON SERPL-MCNC: 69 UG/DL
KETONES URINE: NEGATIVE MG/DL
LDH SERPL-CCNC: 232 U/L
LEUKOCYTE ESTERASE URINE: ABNORMAL
MICROSCOPIC-UA: NORMAL
NITRITE URINE: NEGATIVE
PH URINE: 7.5
PROTEIN URINE: NEGATIVE MG/DL
RED BLOOD CELLS URINE: 2 /HPF
SPECIFIC GRAVITY URINE: 1.01
TIBC SERPL-MCNC: 324 UG/DL
TSH SERPL-ACNC: 3.8 UIU/ML
UIBC SERPL-MCNC: 255 UG/DL
UROBILINOGEN URINE: 0.2 MG/DL
WHITE BLOOD CELLS URINE: 0 /HPF

## 2025-07-31 ENCOUNTER — RESULT REVIEW (OUTPATIENT)
Age: 65
End: 2025-07-31

## 2025-07-31 ENCOUNTER — APPOINTMENT (OUTPATIENT)
Dept: ULTRASOUND IMAGING | Facility: IMAGING CENTER | Age: 65
End: 2025-07-31
Payer: MEDICARE

## 2025-07-31 ENCOUNTER — APPOINTMENT (OUTPATIENT)
Dept: MAMMOGRAPHY | Facility: IMAGING CENTER | Age: 65
End: 2025-07-31
Payer: MEDICARE

## 2025-07-31 ENCOUNTER — OUTPATIENT (OUTPATIENT)
Dept: OUTPATIENT SERVICES | Facility: HOSPITAL | Age: 65
LOS: 1 days | End: 2025-07-31
Payer: MEDICARE

## 2025-07-31 DIAGNOSIS — Z98.891 HISTORY OF UTERINE SCAR FROM PREVIOUS SURGERY: Chronic | ICD-10-CM

## 2025-07-31 DIAGNOSIS — K82.4 CHOLESTEROLOSIS OF GALLBLADDER: ICD-10-CM

## 2025-07-31 DIAGNOSIS — Z98.890 OTHER SPECIFIED POSTPROCEDURAL STATES: Chronic | ICD-10-CM

## 2025-07-31 DIAGNOSIS — Z00.00 ENCOUNTER FOR GENERAL ADULT MEDICAL EXAMINATION WITHOUT ABNORMAL FINDINGS: ICD-10-CM

## 2025-07-31 DIAGNOSIS — Z98.49 CATARACT EXTRACTION STATUS, UNSPECIFIED EYE: Chronic | ICD-10-CM

## 2025-07-31 PROCEDURE — 77063 BREAST TOMOSYNTHESIS BI: CPT | Mod: 26

## 2025-07-31 PROCEDURE — 77063 BREAST TOMOSYNTHESIS BI: CPT

## 2025-07-31 PROCEDURE — 77067 SCR MAMMO BI INCL CAD: CPT | Mod: 26

## 2025-07-31 PROCEDURE — 76700 US EXAM ABDOM COMPLETE: CPT | Mod: 26

## 2025-07-31 PROCEDURE — 76700 US EXAM ABDOM COMPLETE: CPT

## 2025-07-31 PROCEDURE — 77067 SCR MAMMO BI INCL CAD: CPT

## 2025-09-03 ENCOUNTER — APPOINTMENT (OUTPATIENT)
Dept: ENDOCRINOLOGY | Facility: CLINIC | Age: 65
End: 2025-09-03
Payer: MEDICARE

## 2025-09-03 VITALS
BODY MASS INDEX: 23.08 KG/M2 | SYSTOLIC BLOOD PRESSURE: 119 MMHG | WEIGHT: 116 LBS | DIASTOLIC BLOOD PRESSURE: 70 MMHG | HEART RATE: 93 BPM | HEIGHT: 59.5 IN | OXYGEN SATURATION: 98 %

## 2025-09-03 DIAGNOSIS — E21.3 HYPERPARATHYROIDISM, UNSPECIFIED: ICD-10-CM

## 2025-09-03 DIAGNOSIS — M81.0 AGE-RELATED OSTEOPOROSIS W/OUT CURRENT PATHOLOGICAL FRACTURE: ICD-10-CM

## 2025-09-03 LAB
24R-OH-CALCIDIOL SERPL-MCNC: 48.7 PG/ML
25(OH)D3 SERPL-MCNC: 43.6 NG/ML
ALBUMIN SERPL ELPH-MCNC: 4.6 G/DL
ALP BLD-CCNC: 78 U/L
ALT SERPL-CCNC: 32 U/L
ANION GAP SERPL CALC-SCNC: 12 MMOL/L
AST SERPL-CCNC: 39 U/L
BILIRUB SERPL-MCNC: 0.5 MG/DL
BUN SERPL-MCNC: 17 MG/DL
CALCIUM SERPL-MCNC: 9.8 MG/DL
CALCIUM SERPL-MCNC: 9.8 MG/DL
CHLORIDE SERPL-SCNC: 102 MMOL/L
CO2 SERPL-SCNC: 25 MMOL/L
CREAT SERPL-MCNC: 0.64 MG/DL
EGFRCR SERPLBLD CKD-EPI 2021: 98 ML/MIN/1.73M2
GLUCOSE SERPL-MCNC: 92 MG/DL
PARATHYROID HORMONE INTACT: 69 PG/ML
PHOSPHATE SERPL-MCNC: 3.6 MG/DL
POTASSIUM SERPL-SCNC: 4 MMOL/L
PROT SERPL-MCNC: 7.3 G/DL
SODIUM SERPL-SCNC: 139 MMOL/L

## 2025-09-03 PROCEDURE — 99205 OFFICE O/P NEW HI 60 MIN: CPT

## 2025-09-06 LAB
CALCIUM 24H UR-MRATE: 122 MG/24HR
CALCIUM UR-MCNC: 9 MG/DL
COLLECT DURATION TIME SPEC: 24 HR
CREAT 24H UR-MCNC: 40 MG/DL
CREATININE [MASS/TIME] BY CALCULATED IN URINE COLLECTED FOR UNSPECIFIED DURATION: 0.5 G/24 HR
Lab: 225 MG/G
SPECIMEN VOL ?TM UR: 1350 ML

## 2025-09-08 LAB — COLLAGEN CTX SERPL-MCNC: 57 PG/ML

## (undated) DEVICE — CATH IV SAFE BC 22G X 1" (BLUE)

## (undated) DEVICE — FORCEP RADIAL JAW 4 JUMBO 2.8MM 3.2MM 240CM ORANGE DISP

## (undated) DEVICE — DRSG CURITY GAUZE SPONGE 4 X 4" 12-PLY NON-STERILE

## (undated) DEVICE — DRSG 2X2

## (undated) DEVICE — IRRIGATOR BIO SHIELD

## (undated) DEVICE — BITE BLOCK ADULT 20 X 27MM (GREEN)

## (undated) DEVICE — ELCTR ECG CONDUCTIVE ADHESIVE

## (undated) DEVICE — SOL INJ NS 0.9% 500ML 2 PORT

## (undated) DEVICE — SALIVA EJECTOR (BLUE)

## (undated) DEVICE — BIOPSY FORCEP RADIAL JAW 4 STANDARD WITH NEEDLE

## (undated) DEVICE — CATH IV SAFE BC 20G X 1.16" (PINK)

## (undated) DEVICE — ELCTR GROUNDING PAD ADULT COVIDIEN

## (undated) DEVICE — TUBING IV SET GRAVITY 3Y 100" MACRO

## (undated) DEVICE — TUBING MEDI-VAC W MAXIGRIP CONNECTORS 1/4"X6'

## (undated) DEVICE — PACK IV START WITH CHG

## (undated) DEVICE — CLAMP BX HOT RAD JAW 3

## (undated) DEVICE — BASIN EMESIS 10IN GRADUATED MAUVE

## (undated) DEVICE — BALLOON US ENDO

## (undated) DEVICE — TUBING SUCTION 20FT

## (undated) DEVICE — FOLEY HOLDER STATLOCK 2 WAY ADULT

## (undated) DEVICE — FORCEP MULTIBITE MULTIPLE SAMPLE 2.4MM 2.8MM 240CM ORANGE DISP

## (undated) DEVICE — POLY TRAP ETRAP

## (undated) DEVICE — SYR ALLIANCE II INFLATION 60ML

## (undated) DEVICE — GOWN LG

## (undated) DEVICE — TUBING SUCTION NONCONDUCTIVE 6MM X 12FT

## (undated) DEVICE — SENSOR O2 FINGER ADULT

## (undated) DEVICE — DRSG BANDAID 0.75X3"

## (undated) DEVICE — SUCTION YANKAUER NO CONTROL VENT

## (undated) DEVICE — BIOPSY FORCEP COLD DISP

## (undated) DEVICE — BRUSH COLONOSCOPY CYTOLOGY

## (undated) DEVICE — CONTAINER FORMALIN 80ML YELLOW

## (undated) DEVICE — TUBING SUCTION CONN 6FT STERILE

## (undated) DEVICE — SYR LUER LOK 50CC

## (undated) DEVICE — LUBRICATING JELLY HR ONE SHOT 3G